# Patient Record
Sex: FEMALE | Race: WHITE | NOT HISPANIC OR LATINO | Employment: PART TIME | URBAN - NONMETROPOLITAN AREA
[De-identification: names, ages, dates, MRNs, and addresses within clinical notes are randomized per-mention and may not be internally consistent; named-entity substitution may affect disease eponyms.]

---

## 2024-03-29 ENCOUNTER — HOSPITAL ENCOUNTER (EMERGENCY)
Facility: HOSPITAL | Age: 79
Discharge: HOME | End: 2024-03-29
Attending: STUDENT IN AN ORGANIZED HEALTH CARE EDUCATION/TRAINING PROGRAM
Payer: COMMERCIAL

## 2024-03-29 ENCOUNTER — APPOINTMENT (EMERGENCY)
Dept: CT IMAGING | Facility: HOSPITAL | Age: 79
End: 2024-03-29
Payer: COMMERCIAL

## 2024-03-29 ENCOUNTER — OFFICE VISIT (OUTPATIENT)
Dept: URGENT CARE | Facility: CLINIC | Age: 79
End: 2024-03-29
Payer: COMMERCIAL

## 2024-03-29 VITALS
SYSTOLIC BLOOD PRESSURE: 108 MMHG | WEIGHT: 213 LBS | DIASTOLIC BLOOD PRESSURE: 54 MMHG | HEART RATE: 66 BPM | HEIGHT: 64 IN | TEMPERATURE: 97.2 F | OXYGEN SATURATION: 98 % | BODY MASS INDEX: 36.37 KG/M2 | RESPIRATION RATE: 18 BRPM

## 2024-03-29 VITALS
DIASTOLIC BLOOD PRESSURE: 56 MMHG | RESPIRATION RATE: 20 BRPM | TEMPERATURE: 97.6 F | HEIGHT: 64 IN | OXYGEN SATURATION: 94 % | SYSTOLIC BLOOD PRESSURE: 119 MMHG | HEART RATE: 86 BPM | WEIGHT: 213 LBS | BODY MASS INDEX: 36.37 KG/M2

## 2024-03-29 DIAGNOSIS — R79.89 ELEVATED SERUM CREATININE: ICD-10-CM

## 2024-03-29 DIAGNOSIS — R30.0 DYSURIA: Primary | ICD-10-CM

## 2024-03-29 DIAGNOSIS — N30.90 CYSTITIS: Primary | ICD-10-CM

## 2024-03-29 LAB
ALBUMIN SERPL BCP-MCNC: 4.1 G/DL (ref 3.5–5)
ALP SERPL-CCNC: 58 U/L (ref 34–104)
ALT SERPL W P-5'-P-CCNC: 11 U/L (ref 7–52)
ANION GAP SERPL CALCULATED.3IONS-SCNC: 7 MMOL/L (ref 4–13)
AST SERPL W P-5'-P-CCNC: 16 U/L (ref 13–39)
BACTERIA UR QL AUTO: ABNORMAL /HPF
BASOPHILS # BLD AUTO: 0.02 THOUSANDS/ÂΜL (ref 0–0.1)
BASOPHILS NFR BLD AUTO: 0 % (ref 0–1)
BILIRUB SERPL-MCNC: 0.43 MG/DL (ref 0.2–1)
BILIRUB UR QL STRIP: ABNORMAL
BUN SERPL-MCNC: 37 MG/DL (ref 5–25)
CALCIUM SERPL-MCNC: 9.3 MG/DL (ref 8.4–10.2)
CHLORIDE SERPL-SCNC: 104 MMOL/L (ref 96–108)
CLARITY UR: CLEAR
CO2 SERPL-SCNC: 29 MMOL/L (ref 21–32)
COLOR UR: YELLOW
CREAT SERPL-MCNC: 1.32 MG/DL (ref 0.6–1.3)
EOSINOPHIL # BLD AUTO: 0.04 THOUSAND/ÂΜL (ref 0–0.61)
EOSINOPHIL NFR BLD AUTO: 1 % (ref 0–6)
ERYTHROCYTE [DISTWIDTH] IN BLOOD BY AUTOMATED COUNT: 13.6 % (ref 11.6–15.1)
GFR SERPL CREATININE-BSD FRML MDRD: 39 ML/MIN/1.73SQ M
GLUCOSE SERPL-MCNC: 132 MG/DL (ref 65–140)
GLUCOSE UR STRIP-MCNC: NEGATIVE MG/DL
HCT VFR BLD AUTO: 35.1 % (ref 34.8–46.1)
HGB BLD-MCNC: 10.8 G/DL (ref 11.5–15.4)
HGB UR QL STRIP.AUTO: ABNORMAL
IMM GRANULOCYTES # BLD AUTO: 0.03 THOUSAND/UL (ref 0–0.2)
IMM GRANULOCYTES NFR BLD AUTO: 0 % (ref 0–2)
KETONES UR STRIP-MCNC: NEGATIVE MG/DL
LACTATE SERPL-SCNC: 1.1 MMOL/L (ref 0.5–2)
LEUKOCYTE ESTERASE UR QL STRIP: ABNORMAL
LYMPHOCYTES # BLD AUTO: 0.8 THOUSANDS/ÂΜL (ref 0.6–4.47)
LYMPHOCYTES NFR BLD AUTO: 11 % (ref 14–44)
MCH RBC QN AUTO: 32.6 PG (ref 26.8–34.3)
MCHC RBC AUTO-ENTMCNC: 30.8 G/DL (ref 31.4–37.4)
MCV RBC AUTO: 106 FL (ref 82–98)
MONOCYTES # BLD AUTO: 0.85 THOUSAND/ÂΜL (ref 0.17–1.22)
MONOCYTES NFR BLD AUTO: 11 % (ref 4–12)
NEUTROPHILS # BLD AUTO: 5.87 THOUSANDS/ÂΜL (ref 1.85–7.62)
NEUTS SEG NFR BLD AUTO: 77 % (ref 43–75)
NITRITE UR QL STRIP: NEGATIVE
NON-SQ EPI CELLS URNS QL MICRO: ABNORMAL /HPF
NRBC BLD AUTO-RTO: 0 /100 WBCS
PH UR STRIP.AUTO: 5 [PH]
PLATELET # BLD AUTO: 162 THOUSANDS/UL (ref 149–390)
PMV BLD AUTO: 10.5 FL (ref 8.9–12.7)
POTASSIUM SERPL-SCNC: 4.2 MMOL/L (ref 3.5–5.3)
PROT SERPL-MCNC: 7.1 G/DL (ref 6.4–8.4)
PROT UR STRIP-MCNC: NEGATIVE MG/DL
RBC # BLD AUTO: 3.31 MILLION/UL (ref 3.81–5.12)
RBC #/AREA URNS AUTO: ABNORMAL /HPF
SODIUM SERPL-SCNC: 140 MMOL/L (ref 135–147)
SP GR UR STRIP.AUTO: 1.02 (ref 1–1.03)
UROBILINOGEN UR QL STRIP.AUTO: 0.2 E.U./DL
WBC # BLD AUTO: 7.61 THOUSAND/UL (ref 4.31–10.16)
WBC #/AREA URNS AUTO: ABNORMAL /HPF

## 2024-03-29 PROCEDURE — 96365 THER/PROPH/DIAG IV INF INIT: CPT

## 2024-03-29 PROCEDURE — 80053 COMPREHEN METABOLIC PANEL: CPT | Performed by: PHYSICIAN ASSISTANT

## 2024-03-29 PROCEDURE — 36415 COLL VENOUS BLD VENIPUNCTURE: CPT | Performed by: PHYSICIAN ASSISTANT

## 2024-03-29 PROCEDURE — 99285 EMERGENCY DEPT VISIT HI MDM: CPT | Performed by: PHYSICIAN ASSISTANT

## 2024-03-29 PROCEDURE — 85025 COMPLETE CBC W/AUTO DIFF WBC: CPT | Performed by: PHYSICIAN ASSISTANT

## 2024-03-29 PROCEDURE — 99203 OFFICE O/P NEW LOW 30 MIN: CPT

## 2024-03-29 PROCEDURE — S9083 URGENT CARE CENTER GLOBAL: HCPCS

## 2024-03-29 PROCEDURE — 74177 CT ABD & PELVIS W/CONTRAST: CPT

## 2024-03-29 PROCEDURE — 81001 URINALYSIS AUTO W/SCOPE: CPT | Performed by: PHYSICIAN ASSISTANT

## 2024-03-29 PROCEDURE — 99283 EMERGENCY DEPT VISIT LOW MDM: CPT

## 2024-03-29 PROCEDURE — 96361 HYDRATE IV INFUSION ADD-ON: CPT

## 2024-03-29 PROCEDURE — 83605 ASSAY OF LACTIC ACID: CPT | Performed by: PHYSICIAN ASSISTANT

## 2024-03-29 RX ORDER — OMEPRAZOLE 20 MG/1
20 CAPSULE, DELAYED RELEASE ORAL DAILY
COMMUNITY
Start: 2024-03-18

## 2024-03-29 RX ORDER — ATORVASTATIN CALCIUM 40 MG/1
40 TABLET, FILM COATED ORAL DAILY
COMMUNITY
Start: 2023-07-31 | End: 2024-07-25

## 2024-03-29 RX ORDER — FUROSEMIDE 40 MG/1
40 TABLET ORAL DAILY
COMMUNITY
Start: 2023-11-20

## 2024-03-29 RX ORDER — WARFARIN SODIUM 5 MG/1
TABLET ORAL
COMMUNITY
Start: 2024-03-21

## 2024-03-29 RX ORDER — ASPIRIN 81 MG/1
81 TABLET, CHEWABLE ORAL DAILY
COMMUNITY

## 2024-03-29 RX ORDER — MONTELUKAST SODIUM 10 MG/1
10 TABLET ORAL
COMMUNITY
Start: 2024-03-11 | End: 2025-03-11

## 2024-03-29 RX ORDER — FOLIC ACID 1 MG/1
1 TABLET ORAL DAILY
COMMUNITY
Start: 2023-09-28 | End: 2024-09-27

## 2024-03-29 RX ORDER — CEFTRIAXONE 1 G/50ML
1000 INJECTION, SOLUTION INTRAVENOUS ONCE
Status: COMPLETED | OUTPATIENT
Start: 2024-03-29 | End: 2024-03-29

## 2024-03-29 RX ORDER — CEFDINIR 300 MG/1
300 CAPSULE ORAL EVERY 12 HOURS SCHEDULED
Qty: 10 CAPSULE | Refills: 0 | Status: SHIPPED | OUTPATIENT
Start: 2024-03-29 | End: 2024-04-03

## 2024-03-29 RX ORDER — FLUTICASONE PROPIONATE AND SALMETEROL 250; 50 UG/1; UG/1
POWDER RESPIRATORY (INHALATION)
COMMUNITY
Start: 2024-03-08

## 2024-03-29 RX ORDER — MONTELUKAST SODIUM 4 MG/1
TABLET, CHEWABLE ORAL
COMMUNITY
Start: 2024-03-18

## 2024-03-29 RX ORDER — LISINOPRIL 10 MG/1
10 TABLET ORAL DAILY
COMMUNITY
Start: 2023-11-20

## 2024-03-29 RX ORDER — NITROFURANTOIN 25; 75 MG/1; MG/1
100 CAPSULE ORAL
COMMUNITY
Start: 2024-03-26 | End: 2024-03-31

## 2024-03-29 RX ORDER — GABAPENTIN 800 MG/1
800 TABLET ORAL 3 TIMES DAILY
COMMUNITY
Start: 2024-02-02

## 2024-03-29 RX ADMIN — CEFTRIAXONE 1000 MG: 1 INJECTION, SOLUTION INTRAVENOUS at 21:45

## 2024-03-29 RX ADMIN — SODIUM CHLORIDE 500 ML: 0.9 INJECTION, SOLUTION INTRAVENOUS at 20:50

## 2024-03-29 RX ADMIN — IOHEXOL 100 ML: 350 INJECTION, SOLUTION INTRAVENOUS at 20:12

## 2024-03-29 NOTE — ED NOTES
Pt denies having to urinate at this time. Pt made aware urine sample needed and to notify nurse when she feels she can give urine sample.      Leidy Denise, RAFAEL  03/29/24 1932

## 2024-03-29 NOTE — PATIENT INSTRUCTIONS
Follow up with PCP in 3-5 days.  Proceed to ER for further evaluation and management of symptoms     If tests have been performed at [facility], our office will contact you with results if changes need to be made to the care plan discussed with you at the visit.  You can review your full results on [facility] MyChart.    Urinary Tract Infection in Women   AMBULATORY CARE:   A urinary tract infection (UTI)  is caused by bacteria that get inside your urinary tract. Your urinary tract includes your kidneys, ureters, bladder, and urethra. A UTI is more common in your lower urinary tract, which includes your bladder and urethra.       Common symptoms include the following:   Urinating more often or waking from sleep to urinate    Pain or burning when you urinate    Pain or pressure in your lower abdomen and back    Urine that smells bad    Blood in your urine    Leaking urine    Seek care immediately if:   You are urinating very little or not at all.    You have a high fever with shaking chills.    You have side or back pain that gets worse.    Call your doctor if:   You have a fever.    You do not feel better after 2 days of taking antibiotics.    You have new symptoms, such as blood or pus in your urine.    You are vomiting.    You have questions or concerns about your condition or care.    Treatment for a UTI  may include antibiotics to treat a bacterial infection. You may also need medicines to decrease pain and burning, or decrease the urge to urinate often. If you have UTIs often (called recurrent UTIs), you may be given antibiotics to take regularly. You will be given directions for when and how to use antibiotics. The goal is to prevent UTIs but not cause antibiotic resistance by using antibiotics too often.  Prevent a UTI:   Empty your bladder often.  Urinate and empty your bladder as soon as you feel the need. Do not hold your urine for long periods of time.    Wipe from front to back after you urinate or have a  bowel movement.  This will help prevent germs from getting into your urinary tract through your urethra.    Drink liquids as directed.  Ask how much liquid to drink each day and which liquids are best for you. You may need to drink more liquids than usual to help flush out the bacteria. Do not drink alcohol, caffeine, or citrus juices. These can irritate your bladder and increase your symptoms. Your healthcare provider may recommend cranberry juice to help prevent a UTI.    Urinate before and after you have sex.  This can help flush out bacteria passed during sex.    Do not douche or use feminine deodorants.  These can change the chemical balance in your vagina.    Change sanitary pads or tampons often.  This will help prevent germs from getting into your urinary tract.    Talk to your healthcare provider about your birth control method.  You may need to change your method if it is increasing your risk for UTIs.    Wear cotton underwear and clothes that are loose.  Tight pants and nylon underwear can trap moisture and cause bacteria to grow.    Vaginal estrogen may be recommended.  This medicine helps prevent UTIs in women who have gone through menopause or are in herminio-menopause.    Do pelvic muscle exercises often.  Pelvic muscle exercises may help you start and stop urinating. Strong pelvic muscles may help you empty your bladder easier. Squeeze these muscles tightly for 5 seconds like you are trying to hold back urine. Then relax for 5 seconds. Gradually work up to squeezing for 10 seconds. Do 3 sets of 15 repetitions a day, or as directed.    Follow up with your doctor as directed:  Write down your questions so you remember to ask them during your visits.  © Copyright [employer] 2023 Information is for End User's use only and may not be sold, redistributed or otherwise used for commercial purposes.  The above information is an  only. It is not intended as medical advice for individual conditions or  treatments. Talk to your doctor, nurse or pharmacist before following any medical regimen to see if it is safe and effective for you.

## 2024-03-29 NOTE — PROGRESS NOTES
[facility]        NAME: Mary Carmen Woo is a 78 y.o. female  : 1945    MRN: [id number]  DATE: 2024  TIME: 5:57 PM    Assessment and Plan   Dysuria [R30.0]  1. Dysuria  CANCELED: POCT urine dip        Patient provided water but unable to produce urine specimen. Patient ill-appearing with dry mucous membranes and concern for worsening infection despite Macrobid for acute cystitis. Requires higher level of care and recommend she proceed to ED for further evaluation and management of symptoms. Patient agreeable. Complete assessment deferred to ED.     Patient Instructions     Patient Instructions   Follow up with PCP in 3-5 days.  Proceed to ER for further evaluation and management of symptoms     If tests have been performed at [facility], our office will contact you with results if changes need to be made to the care plan discussed with you at the visit.  You can review your full results on [facility] MyChart.    Urinary Tract Infection in Women   AMBULATORY CARE:   A urinary tract infection (UTI)  is caused by bacteria that get inside your urinary tract. Your urinary tract includes your kidneys, ureters, bladder, and urethra. A UTI is more common in your lower urinary tract, which includes your bladder and urethra.       Common symptoms include the following:   Urinating more often or waking from sleep to urinate    Pain or burning when you urinate    Pain or pressure in your lower abdomen and back    Urine that smells bad    Blood in your urine    Leaking urine    Seek care immediately if:   You are urinating very little or not at all.    You have a high fever with shaking chills.    You have side or back pain that gets worse.    Call your doctor if:   You have a fever.    You do not feel better after 2 days of taking antibiotics.    You have new symptoms, such as blood or pus in your urine.    You are vomiting.    You have questions or concerns about your condition or care.    Treatment  for a UTI  may include antibiotics to treat a bacterial infection. You may also need medicines to decrease pain and burning, or decrease the urge to urinate often. If you have UTIs often (called recurrent UTIs), you may be given antibiotics to take regularly. You will be given directions for when and how to use antibiotics. The goal is to prevent UTIs but not cause antibiotic resistance by using antibiotics too often.  Prevent a UTI:   Empty your bladder often.  Urinate and empty your bladder as soon as you feel the need. Do not hold your urine for long periods of time.    Wipe from front to back after you urinate or have a bowel movement.  This will help prevent germs from getting into your urinary tract through your urethra.    Drink liquids as directed.  Ask how much liquid to drink each day and which liquids are best for you. You may need to drink more liquids than usual to help flush out the bacteria. Do not drink alcohol, caffeine, or citrus juices. These can irritate your bladder and increase your symptoms. Your healthcare provider may recommend cranberry juice to help prevent a UTI.    Urinate before and after you have sex.  This can help flush out bacteria passed during sex.    Do not douche or use feminine deodorants.  These can change the chemical balance in your vagina.    Change sanitary pads or tampons often.  This will help prevent germs from getting into your urinary tract.    Talk to your healthcare provider about your birth control method.  You may need to change your method if it is increasing your risk for UTIs.    Wear cotton underwear and clothes that are loose.  Tight pants and nylon underwear can trap moisture and cause bacteria to grow.    Vaginal estrogen may be recommended.  This medicine helps prevent UTIs in women who have gone through menopause or are in herminio-menopause.    Do pelvic muscle exercises often.  Pelvic muscle exercises may help you start and stop urinating. Strong pelvic  muscles may help you empty your bladder easier. Squeeze these muscles tightly for 5 seconds like you are trying to hold back urine. Then relax for 5 seconds. Gradually work up to squeezing for 10 seconds. Do 3 sets of 15 repetitions a day, or as directed.    Follow up with your doctor as directed:  Write down your questions so you remember to ask them during your visits.  © Copyright [employer] 2023 Information is for End User's use only and may not be sold, redistributed or otherwise used for commercial purposes.  The above information is an  only. It is not intended as medical advice for individual conditions or treatments. Talk to your doctor, nurse or pharmacist before following any medical regimen to see if it is safe and effective for you.        Chief Complaint     Chief Complaint   Patient presents with    Vaginal Pain     DX on Tuesday with a UTI, comes in today with Vaginal pain         History of Present Illness       78-year-old female with a past medical history significant for CHF presents with complaints of dysuria, vaginal pain and burning, as well as body aches x 1 day.  Patient reports being diagnosed with urinary tract infection on 3/26/2024 by PCP.  She is currently taking Macrobid as prescribed and reported initial symptom improvement until today.  Denies fever, chills, nausea, vomiting, diarrhea, or flank pain.  No hematuria.  No vaginal discharge.  Patient has been taking OTC Tylenol.     Vaginal Pain  The patient's pertinent negatives include no vaginal discharge. Associated symptoms include back pain and dysuria. Pertinent negatives include no abdominal pain, chills, diarrhea, fever, flank pain, hematuria, nausea, rash, sore throat or vomiting.       Review of Systems   Review of Systems   Constitutional:  Negative for chills and fever.   HENT:  Negative for congestion, ear discharge, ear pain, rhinorrhea and sore throat.    Eyes:  Negative for discharge.   Respiratory:   Negative for cough, shortness of breath and wheezing.    Cardiovascular:  Negative for chest pain.   Gastrointestinal:  Negative for abdominal pain, diarrhea, nausea and vomiting.   Genitourinary:  Positive for difficulty urinating, dysuria and vaginal pain. Negative for flank pain, hematuria and vaginal discharge.   Musculoskeletal:  Positive for back pain and myalgias.   Skin:  Negative for rash.         Current Medications       Current Outpatient Medications:     aspirin 81 mg chewable tablet, Chew 81 mg daily, Disp: , Rfl:     Aspirin-Calcium Carbonate  MG TABS, Take 81 mg by mouth, Disp: , Rfl:     atorvastatin (LIPITOR) 40 mg tablet, Take 40 mg by mouth daily, Disp: , Rfl:     Cholecalciferol 25 MCG (1000 UT) capsule, Take 1,000 Units by mouth daily, Disp: , Rfl:     colestipol (COLESTID) 1 g tablet, , Disp: , Rfl:     Fluticasone-Salmeterol (Advair) 250-50 mcg/dose inhaler, INHALE 1 DOSE BY MOUTH TWICE DAILY, Disp: , Rfl:     folic acid (FOLVITE) 1 mg tablet, Take 1 mg by mouth daily, Disp: , Rfl:     furosemide (LASIX) 40 mg tablet, Take 40 mg by mouth daily, Disp: , Rfl:     gabapentin (NEURONTIN) 800 mg tablet, Take 800 mg by mouth 3 (three) times a day, Disp: , Rfl:     lisinopril (ZESTRIL) 10 mg tablet, Take 10 mg by mouth daily, Disp: , Rfl:     metoprolol tartrate (LOPRESSOR) 25 mg tablet, Take 25 mg by mouth every 12 (twelve) hours, Disp: , Rfl:     montelukast (SINGULAIR) 10 mg tablet, Take 10 mg by mouth, Disp: , Rfl:     nitrofurantoin (MACROBID) 100 mg capsule, Take 100 mg by mouth, Disp: , Rfl:     omeprazole (PriLOSEC) 20 mg delayed release capsule, Take 20 mg by mouth daily, Disp: , Rfl:     warfarin (COUMADIN) 5 mg tablet, , Disp: , Rfl:     Current Allergies     Allergies as of 03/29/2024 - Reviewed 03/29/2024   Allergen Reaction Noted    Codeine GI Intolerance and Other (See Comments) 01/22/2013    Sulfa antibiotics Other (See Comments) and Rash 01/22/2013            The following  "portions of the patient's history were reviewed and updated as appropriate: allergies, current medications, past family history, past medical history, past social history, past surgical history and problem list.     Past Medical History:   Diagnosis Date    GERD (gastroesophageal reflux disease)     Heart failure (HCC)     High blood cholesterol     High blood pressure        Past Surgical History:   Procedure Laterality Date    APPENDECTOMY      BACK SURGERY      CATARACT EXTRACTION Bilateral     CHOLECYSTECTOMY      REPLACEMENT TOTAL KNEE Bilateral     TUBAL LIGATION         No family history on file.      Medications have been verified.        Objective   /56   Pulse 86   Temp 97.6 °F (36.4 °C)   Resp 20   Ht 5' 4\" (1.626 m)   Wt 96.6 kg (213 lb)   SpO2 94%   BMI 36.56 kg/m²   No LMP recorded.       Physical Exam     Physical Exam  Vitals and nursing note reviewed.   Constitutional:       General: She is not in acute distress.     Appearance: She is ill-appearing. She is not toxic-appearing.   HENT:      Head: Normocephalic.      Nose: Nose normal.      Mouth/Throat:      Mouth: Mucous membranes are dry.      Pharynx: Oropharynx is clear.   Eyes:      Conjunctiva/sclera: Conjunctivae normal.   Cardiovascular:      Rate and Rhythm: Normal rate and regular rhythm.      Heart sounds: Normal heart sounds.   Pulmonary:      Effort: Pulmonary effort is normal. No respiratory distress.      Breath sounds: Normal breath sounds. No stridor. No wheezing, rhonchi or rales.   Abdominal:      General: Bowel sounds are normal.      Palpations: Abdomen is soft.      Tenderness: There is abdominal tenderness in the suprapubic area. There is no right CVA tenderness or left CVA tenderness.   Skin:     General: Skin is warm and dry.   Neurological:      Mental Status: She is alert and oriented to person, place, and time.      Gait: Gait is intact.   Psychiatric:         Mood and Affect: Mood normal.         Behavior: " Behavior normal.

## 2024-03-29 NOTE — ED PROVIDER NOTES
"History  Chief Complaint   Patient presents with    Shoulder Pain     Patient reports starting today while at work; denies injury.    Painful Urination     Patient reports recently treated with AntBx for UTI. Symptoms improved, however, today she has painful urination. Patient reports difficulty voiding.     78 year old female presnts to the ED for evaluation dysuria. States Tuesday she went to PCP due to dysuria. Found to have UTI. Started on Macrobid. Has been taking since. Reports she has been feeling \"not to bad\" over the next few days until she went to work at 1430. States at work she developed the dysuria again and pelvic pressure. States she has been peeing frequently recently until today. States she urinated 4 times today and did take her lasix. Reports body aches and pains. Reports she had aches in her shoulders and neck. States she was seen at urgent care and told she might still have UTI and sent to the ED for further evaluation. Patient denies fevers.         Prior to Admission Medications   Prescriptions Last Dose Informant Patient Reported? Taking?   Aspirin-Calcium Carbonate  MG TABS   Yes No   Sig: Take 81 mg by mouth   Cholecalciferol 25 MCG (1000 UT) capsule   Yes No   Sig: Take 1,000 Units by mouth daily   Fluticasone-Salmeterol (Advair) 250-50 mcg/dose inhaler   Yes No   Sig: INHALE 1 DOSE BY MOUTH TWICE DAILY   aspirin 81 mg chewable tablet   Yes No   Sig: Chew 81 mg daily   atorvastatin (LIPITOR) 40 mg tablet   Yes No   Sig: Take 40 mg by mouth daily   colestipol (COLESTID) 1 g tablet   Yes No   folic acid (FOLVITE) 1 mg tablet   Yes No   Sig: Take 1 mg by mouth daily   furosemide (LASIX) 40 mg tablet   Yes No   Sig: Take 40 mg by mouth daily   gabapentin (NEURONTIN) 800 mg tablet   Yes No   Sig: Take 800 mg by mouth 3 (three) times a day   lisinopril (ZESTRIL) 10 mg tablet   Yes No   Sig: Take 10 mg by mouth daily   metoprolol tartrate (LOPRESSOR) 25 mg tablet   Yes No   Sig: Take 25 mg " by mouth every 12 (twelve) hours   montelukast (SINGULAIR) 10 mg tablet   Yes No   Sig: Take 10 mg by mouth   nitrofurantoin (MACROBID) 100 mg capsule   Yes No   Sig: Take 100 mg by mouth   omeprazole (PriLOSEC) 20 mg delayed release capsule   Yes No   Sig: Take 20 mg by mouth daily   warfarin (COUMADIN) 5 mg tablet   Yes No      Facility-Administered Medications: None       Past Medical History:   Diagnosis Date    GERD (gastroesophageal reflux disease)     Heart failure (HCC)     High blood cholesterol     High blood pressure        Past Surgical History:   Procedure Laterality Date    APPENDECTOMY      BACK SURGERY      CATARACT EXTRACTION Bilateral     CHOLECYSTECTOMY      REPLACEMENT TOTAL KNEE Bilateral     TUBAL LIGATION         History reviewed. No pertinent family history.  I have reviewed and agree with the history as documented.    E-Cigarette/Vaping    E-Cigarette Use Never User      E-Cigarette/Vaping Substances     Social History     Tobacco Use    Smoking status: Never     Passive exposure: Never    Smokeless tobacco: Never   Vaping Use    Vaping status: Never Used   Substance Use Topics    Alcohol use: Not Currently    Drug use: Never       Review of Systems   Constitutional:  Negative for appetite change, fatigue and fever.   Respiratory: Negative.     Cardiovascular: Negative.    Genitourinary:  Positive for dysuria and pelvic pain.   Musculoskeletal:  Positive for myalgias.   Skin: Negative.    Neurological: Negative.    All other systems reviewed and are negative.      Physical Exam  Physical Exam  Vitals and nursing note reviewed.   Constitutional:       General: She is not in acute distress.     Appearance: Normal appearance. She is not ill-appearing, toxic-appearing or diaphoretic.   HENT:      Head: Normocephalic.      Nose: Nose normal.      Mouth/Throat:      Mouth: Mucous membranes are dry.   Eyes:      Conjunctiva/sclera: Conjunctivae normal.      Pupils: Pupils are equal, round, and  reactive to light.   Cardiovascular:      Rate and Rhythm: Normal rate and regular rhythm.   Pulmonary:      Effort: Pulmonary effort is normal.      Breath sounds: Normal breath sounds. No stridor. No wheezing, rhonchi or rales.   Chest:      Chest wall: No tenderness.   Abdominal:      General: Abdomen is flat. There is no distension.      Palpations: Abdomen is soft.      Tenderness: There is no abdominal tenderness. There is no guarding.   Musculoskeletal:         General: Normal range of motion.      Cervical back: Normal range of motion.   Skin:     General: Skin is warm and dry.   Neurological:      General: No focal deficit present.      Mental Status: She is alert.         Vital Signs  ED Triage Vitals [03/29/24 1853]   Temperature Pulse Respirations Blood Pressure SpO2   (!) 97.2 °F (36.2 °C) 84 20 133/77 100 %      Temp Source Heart Rate Source Patient Position - Orthostatic VS BP Location FiO2 (%)   Temporal Monitor Sitting Left arm --      Pain Score       4           Vitals:    03/29/24 1853 03/29/24 1915 03/29/24 2030   BP: 133/77 107/55 108/54   Pulse: 84 76 66   Patient Position - Orthostatic VS: Sitting           Visual Acuity      ED Medications  Medications   sodium chloride 0.9 % bolus 500 mL (500 mL Intravenous New Bag 3/29/24 2050)   cefTRIAXone (ROCEPHIN) IVPB (premix in dextrose) 1,000 mg 50 mL (has no administration in time range)   iohexol (OMNIPAQUE) 350 MG/ML injection (MULTI-DOSE) 100 mL (100 mL Intravenous Given 3/29/24 2012)       Diagnostic Studies  Results Reviewed       Procedure Component Value Units Date/Time    Urine Microscopic [[id number]]  (Abnormal) Collected: 03/29/24 2011    Lab Status: Final result Specimen: Urine, Clean Catch Updated: 03/29/24 2036     RBC, UA 10-20 /hpf      WBC, UA 2-4 /hpf      Epithelial Cells Occasional /hpf      Bacteria, UA None Seen /hpf     UA w Reflex to Microscopic w Reflex to Culture [[id number]]  (Abnormal) Collected: 03/29/24 2011    Lab  Status: Final result Specimen: Urine, Clean Catch Updated: 03/29/24 2022     Color, UA Yellow     Clarity, UA Clear     Specific Gravity, UA 1.020     pH, UA 5.0     Leukocytes, UA Small     Nitrite, UA Negative     Protein, UA Negative mg/dl      Glucose, UA Negative mg/dl      Ketones, UA Negative mg/dl      Urobilinogen, UA 0.2 E.U./dl      Bilirubin, UA Small     Occult Blood, UA Moderate    Lactic acid, plasma (w/reflex if result > 2.0) [[id number]]  (Normal) Collected: 03/29/24 1926    Lab Status: Final result Specimen: Blood from Arm, Left Updated: 03/29/24 1958     LACTIC ACID 1.1 mmol/L     Narrative:      Result may be elevated if tourniquet was used during collection.    Comprehensive metabolic panel [[id number]]  (Abnormal) Collected: 03/29/24 1926    Lab Status: Final result Specimen: Blood from Arm, Left Updated: 03/29/24 1958     Sodium 140 mmol/L      Potassium 4.2 mmol/L      Chloride 104 mmol/L      CO2 29 mmol/L      ANION GAP 7 mmol/L      BUN 37 mg/dL      Creatinine 1.32 mg/dL      Glucose 132 mg/dL      Calcium 9.3 mg/dL      AST 16 U/L      ALT 11 U/L      Alkaline Phosphatase 58 U/L      Total Protein 7.1 g/dL      Albumin 4.1 g/dL      Total Bilirubin 0.43 mg/dL      eGFR 39 ml/min/1.73sq m     Narrative:      National Kidney Disease Foundation guidelines for Chronic Kidney Disease (CKD):     Stage 1 with normal or high GFR (GFR > 90 mL/min/1.73 square meters)    Stage 2 Mild CKD (GFR = 60-89 mL/min/1.73 square meters)    Stage 3A Moderate CKD (GFR = 45-59 mL/min/1.73 square meters)    Stage 3B Moderate CKD (GFR = 30-44 mL/min/1.73 square meters)    Stage 4 Severe CKD (GFR = 15-29 mL/min/1.73 square meters)    Stage 5 End Stage CKD (GFR <15 mL/min/1.73 square meters)  Note: GFR calculation is accurate only with a steady state creatinine    CBC and differential [[id number]]  (Abnormal) Collected: 03/29/24 1926    Lab Status: Final result Specimen: Blood from Arm, Left Updated: 03/29/24 1933      WBC 7.61 Thousand/uL      RBC 3.31 Million/uL      Hemoglobin 10.8 g/dL      Hematocrit 35.1 %       fL      MCH 32.6 pg      MCHC 30.8 g/dL      RDW 13.6 %      MPV 10.5 fL      Platelets 162 Thousands/uL      nRBC 0 /100 WBCs      Neutrophils Relative 77 %      Immature Grans % 0 %      Lymphocytes Relative 11 %      Monocytes Relative 11 %      Eosinophils Relative 1 %      Basophils Relative 0 %      Neutrophils Absolute 5.87 Thousands/µL      Absolute Immature Grans 0.03 Thousand/uL      Absolute Lymphocytes 0.80 Thousands/µL      Absolute Monocytes 0.85 Thousand/µL      Eosinophils Absolute 0.04 Thousand/µL      Basophils Absolute 0.02 Thousands/µL                    CT abdomen pelvis with contrast   Final Result by Juni Vazquez MD (03/29 2128)      Inflammatory or fibrotic changes in the lung bases bilaterally.      Nondistended bladder with wall thickening for urinalysis to exclude cystitis. This correlates with the provided clinical history of painful urination and difficulty voiding.         Workstation performed: [id number]                    Procedures  Procedures         ED Course  ED Course as of 03/29/24 2143   Fri Mar 29, 2024   1955 WBC: 7.61   2005 Creatinine(!): 1.32  KALLI.  Patient is tolerating p.o. intake.  Does take Lasix   2140 Please CTC results.  I discussed results and findings with the patient.  Discussed inpatient treatment versus trialing additional course of outpatient antibiotics.  Patient would prefer outpatient treatment at this time.  Will give dose of IV Rocephin and change patient's antibiotic to Ceftin.  She will hold her Lasix for 1 day and then continue as normal.  She will stay well-hydrated.  She has follow-up with her PCP next week.  We discussed return precautions and she verbalized understanding.  She was clinically and hemodynamically stable                               SBIRT 20yo+      Flowsheet Row Most Recent Value   Initial Alcohol Screen: US AUDIT-C      1. How often do you have a drink containing alcohol? 0 Filed at: 03/29/2024 1855   2. How many drinks containing alcohol do you have on a typical day you are drinking?  0 Filed at: 03/29/2024 1855   3a. Male UNDER 65: How often do you have five or more drinks on one occasion? 0 Filed at: 03/29/2024 1855   3b. FEMALE Any Age, or MALE 65+: How often do you have 4 or more drinks on one occassion? 0 Filed at: 03/29/2024 1855   Audit-C Score 0 Filed at: 03/29/2024 1855   RONNA: How many times in the past year have you...    Used an illegal drug or used a prescription medication for non-medical reasons? Never Filed at: 03/29/2024 1855                      Medical Decision Making  78-year-old female presented to the emergency department for evaluation of dysuria.  Vitals and medical record reviewed.  Patient at risk for the following but not limited to urinary tract infection, cystitis, ureteral stone, hydration, electrolyte abnormality, KALLI.  Patient's UA not significantly concerning for UTI however CT was concerning for cystitis.  Patient being symptomatic she was given dose of IV Rocephin.  She was switched from Macrobid to cefdinir for outpatient antibiotics.  Have small elevation in her creatinine was given some amount of fluids in the emergency department educated to stay well-hydrated and hold her Lasix for 1 day.  She has follow-up with her PCP next week.  Prefer discharge home rather than admission.  We discussed strict return precautions in the meantime and patient verbalized understanding.  She is clinically and hemodynamically stable for discharge.    Problems Addressed:  Cystitis: acute illness or injury  Elevated serum creatinine: acute illness or injury    Amount and/or Complexity of Data Reviewed  Labs: ordered. Decision-making details documented in ED Course.  Radiology: ordered.    Risk  Prescription drug management.             Disposition  Final diagnoses:   Cystitis   Elevated serum creatinine      Time reflects when diagnosis was documented in both MDM as applicable and the Disposition within this note       Time User Action Codes Description Comment    3/29/2024  9:41 PM Anabelle Rosales [N30.90] Cystitis     3/29/2024  9:41 PM Anabelle Rosales [R79.89] Elevated serum creatinine           ED Disposition       ED Disposition   Discharge    Condition   Stable    Date/Time   Fri Mar 29, 2024 2141    Comment   Mary Carmen Woo discharge to home/self care.                   Follow-up Information       Follow up With Specialties Details Why Contact Info    Maya Quezada MD Family Medicine In 3 days  [address]  [address]  [facility] [address] [phone]              Patient's Medications   Discharge Prescriptions    CEFDINIR (OMNICEF) 300 MG CAPSULE    Take 1 capsule (300 mg total) by mouth every 12 (twelve) hours for 5 days       Start Date: 3/29/2024 End Date: 4/3/2024       Order Dose: 300 mg       Quantity: 10 capsule    Refills: 0       No discharge procedures on file.    PDMP Review       None            ED Provider  Electronically Signed by             Anabelle Rosales PA-C  03/29/24 2130       Anabelle Rosales PA-C  03/29/24 2143

## 2024-03-30 NOTE — ED NOTES
Per Anabelle BARROS pt okay to be d/c home after IV antibiotics are complete.      Leidy Denise RN  03/29/24 7366

## 2024-03-30 NOTE — ED ATTENDING ATTESTATION
3/29/2024  I, Gary Landers DO, saw and evaluated the patient. I have discussed the patient with the resident/non-physician practitioner and agree with the resident's/non-physician practitioner's findings, Plan of Care, and MDM as documented in the resident's/non-physician practitioner's note, except where noted. All available labs and Radiology studies were reviewed.  I was present for key portions of any procedure(s) performed by the resident/non-physician practitioner and I was immediately available to provide assistance.       At this point I agree with the current assessment done in the Emergency Department.  I have conducted an independent evaluation of this patient a history and physical is as follows:    78-year-old female.  Presents with suprapubic abdominal pain, pelvic pain. Currently on a course of Macrobid for treatment of UTI. W/u sig for mild KALLI. On Lasix. UA is noninfectious appearing. Given CT read, abx were changed to Keflex. The patient was counseled to hold her Lasix for the next 1-2 days. The patient has PCP follow up next week. See Ms. Rosales's note for further details.     Const: Well-nourished, Well-developed   HEENT: Atraumatic external nose and ears. Moist MM  Neck: Symmetric, trachea midline  CVS: +S1/S2, No murmurs or gallops. Peripheral pulses 2+ and equal in all extremities.  RESP: Unlabored respiratory effort. Clear to auscultation bilaterally   GI: Mild TTP along lower abdomen. Nondistended No hepatosplenomegaly   MSK: Normocephalic/Atraumatic, Extremities w/o deformity or ttp. No cyanosis or clubbing  Skin: Warm, Dry. No rashes or lesions.  Neuro: CNs II-XII grossly intact . Sensation grossly intact.  Psych: Awake, Alert, & Oriented (AAO) x3 . Appropriate mood and affect       ED Course  ED Course as of 03/29/24 2253   Fri Mar 29, 2024   2137 CT imaging with signs of a nondistended bladder with wall thickening for urinalysis to exclude cystitis.          Critical Care  Time  Procedures

## 2024-03-30 NOTE — DISCHARGE INSTRUCTIONS
Stop taking the Macrobid and please start new antibiotics sent to your pharmacy.  Stay well-hydrated hold your Lasix for 1 day and then continues as normal.  Follow-up with your family doctor next week.  You should have your kidney function rechecked.  In the meantime please return with any new or worsening symptoms

## 2024-06-22 ENCOUNTER — HOSPITAL ENCOUNTER (EMERGENCY)
Facility: HOSPITAL | Age: 79
Discharge: HOME | End: 2024-06-22
Attending: EMERGENCY MEDICINE
Payer: COMMERCIAL

## 2024-06-22 ENCOUNTER — OFFICE VISIT (OUTPATIENT)
Dept: URGENT CARE | Facility: CLINIC | Age: 79
End: 2024-06-22
Payer: COMMERCIAL

## 2024-06-22 VITALS
RESPIRATION RATE: 18 BRPM | WEIGHT: 210.8 LBS | HEART RATE: 80 BPM | BODY MASS INDEX: 35.99 KG/M2 | HEIGHT: 64 IN | TEMPERATURE: 98.2 F | DIASTOLIC BLOOD PRESSURE: 81 MMHG | SYSTOLIC BLOOD PRESSURE: 159 MMHG | OXYGEN SATURATION: 94 %

## 2024-06-22 VITALS
HEART RATE: 92 BPM | TEMPERATURE: 96.8 F | SYSTOLIC BLOOD PRESSURE: 126 MMHG | DIASTOLIC BLOOD PRESSURE: 60 MMHG | WEIGHT: 210 LBS | OXYGEN SATURATION: 94 % | HEIGHT: 64 IN | RESPIRATION RATE: 17 BRPM | BODY MASS INDEX: 35.85 KG/M2

## 2024-06-22 DIAGNOSIS — R94.31 ABNORMAL EKG: ICD-10-CM

## 2024-06-22 DIAGNOSIS — N76.0 ACUTE VAGINITIS: ICD-10-CM

## 2024-06-22 DIAGNOSIS — N39.0 UTI (URINARY TRACT INFECTION): Primary | ICD-10-CM

## 2024-06-22 DIAGNOSIS — R53.83 OTHER FATIGUE: Primary | ICD-10-CM

## 2024-06-22 LAB
ALBUMIN SERPL BCG-MCNC: 4 G/DL (ref 3.5–5)
ALP SERPL-CCNC: 61 U/L (ref 34–104)
ALT SERPL W P-5'-P-CCNC: 9 U/L (ref 7–52)
ANION GAP SERPL CALCULATED.3IONS-SCNC: 9 MMOL/L (ref 4–13)
APTT PPP: 27 SECONDS (ref 23–37)
AST SERPL W P-5'-P-CCNC: 14 U/L (ref 13–39)
BASOPHILS # BLD AUTO: 0.03 THOUSANDS/ÂΜL (ref 0–0.1)
BASOPHILS NFR BLD AUTO: 0 % (ref 0–1)
BILIRUB SERPL-MCNC: 0.7 MG/DL (ref 0.2–1)
BNP SERPL-MCNC: 135 PG/ML (ref 0–100)
BUN SERPL-MCNC: 27 MG/DL (ref 5–25)
CALCIUM SERPL-MCNC: 9.5 MG/DL (ref 8.4–10.2)
CARDIAC TROPONIN I PNL SERPL HS: 7 NG/L
CHLORIDE SERPL-SCNC: 107 MMOL/L (ref 96–108)
CO2 SERPL-SCNC: 25 MMOL/L (ref 21–32)
CREAT SERPL-MCNC: 1.07 MG/DL (ref 0.6–1.3)
EOSINOPHIL # BLD AUTO: 0.04 THOUSAND/ÂΜL (ref 0–0.61)
EOSINOPHIL NFR BLD AUTO: 1 % (ref 0–6)
ERYTHROCYTE [DISTWIDTH] IN BLOOD BY AUTOMATED COUNT: 13.6 % (ref 11.6–15.1)
GFR SERPL CREATININE-BSD FRML MDRD: 50 ML/MIN/1.73SQ M
GLUCOSE SERPL-MCNC: 97 MG/DL (ref 65–140)
HCT VFR BLD AUTO: 36.9 % (ref 34.8–46.1)
HGB BLD-MCNC: 11.5 G/DL (ref 11.5–15.4)
IMM GRANULOCYTES # BLD AUTO: 0.03 THOUSAND/UL (ref 0–0.2)
IMM GRANULOCYTES NFR BLD AUTO: 0 % (ref 0–2)
INR PPP: 1.72 (ref 0.84–1.19)
LACTATE SERPL-SCNC: 1.3 MMOL/L (ref 0.5–2)
LYMPHOCYTES # BLD AUTO: 0.81 THOUSANDS/ÂΜL (ref 0.6–4.47)
LYMPHOCYTES NFR BLD AUTO: 9 % (ref 14–44)
MAGNESIUM SERPL-MCNC: 1.7 MG/DL (ref 1.9–2.7)
MCH RBC QN AUTO: 32.9 PG (ref 26.8–34.3)
MCHC RBC AUTO-ENTMCNC: 31.2 G/DL (ref 31.4–37.4)
MCV RBC AUTO: 105 FL (ref 82–98)
MONOCYTES # BLD AUTO: 0.69 THOUSAND/ÂΜL (ref 0.17–1.22)
MONOCYTES NFR BLD AUTO: 8 % (ref 4–12)
NEUTROPHILS # BLD AUTO: 7.2 THOUSANDS/ÂΜL (ref 1.85–7.62)
NEUTS SEG NFR BLD AUTO: 82 % (ref 43–75)
NRBC BLD AUTO-RTO: 0 /100 WBCS
PLATELET # BLD AUTO: 170 THOUSANDS/UL (ref 149–390)
PMV BLD AUTO: 10.6 FL (ref 8.9–12.7)
POTASSIUM SERPL-SCNC: 4 MMOL/L (ref 3.5–5.3)
PROT SERPL-MCNC: 7.6 G/DL (ref 6.4–8.4)
PROTHROMBIN TIME: 20.5 SECONDS (ref 11.6–14.5)
RBC # BLD AUTO: 3.5 MILLION/UL (ref 3.81–5.12)
SODIUM SERPL-SCNC: 141 MMOL/L (ref 135–147)
WBC # BLD AUTO: 8.8 THOUSAND/UL (ref 4.31–10.16)

## 2024-06-22 PROCEDURE — 36415 COLL VENOUS BLD VENIPUNCTURE: CPT | Performed by: EMERGENCY MEDICINE

## 2024-06-22 PROCEDURE — 99213 OFFICE O/P EST LOW 20 MIN: CPT | Performed by: PHYSICIAN ASSISTANT

## 2024-06-22 PROCEDURE — 85025 COMPLETE CBC W/AUTO DIFF WBC: CPT | Performed by: EMERGENCY MEDICINE

## 2024-06-22 PROCEDURE — 83735 ASSAY OF MAGNESIUM: CPT | Performed by: EMERGENCY MEDICINE

## 2024-06-22 PROCEDURE — 93005 ELECTROCARDIOGRAM TRACING: CPT

## 2024-06-22 PROCEDURE — 80053 COMPREHEN METABOLIC PANEL: CPT | Performed by: EMERGENCY MEDICINE

## 2024-06-22 PROCEDURE — 87086 URINE CULTURE/COLONY COUNT: CPT | Performed by: PHYSICIAN ASSISTANT

## 2024-06-22 PROCEDURE — 87186 SC STD MICRODIL/AGAR DIL: CPT | Performed by: PHYSICIAN ASSISTANT

## 2024-06-22 PROCEDURE — 87077 CULTURE AEROBIC IDENTIFY: CPT | Performed by: PHYSICIAN ASSISTANT

## 2024-06-22 PROCEDURE — 87040 BLOOD CULTURE FOR BACTERIA: CPT | Performed by: EMERGENCY MEDICINE

## 2024-06-22 PROCEDURE — 85730 THROMBOPLASTIN TIME PARTIAL: CPT | Performed by: EMERGENCY MEDICINE

## 2024-06-22 PROCEDURE — 99283 EMERGENCY DEPT VISIT LOW MDM: CPT

## 2024-06-22 PROCEDURE — 96365 THER/PROPH/DIAG IV INF INIT: CPT

## 2024-06-22 PROCEDURE — 99284 EMERGENCY DEPT VISIT MOD MDM: CPT | Performed by: EMERGENCY MEDICINE

## 2024-06-22 PROCEDURE — 84484 ASSAY OF TROPONIN QUANT: CPT | Performed by: EMERGENCY MEDICINE

## 2024-06-22 PROCEDURE — 83605 ASSAY OF LACTIC ACID: CPT | Performed by: EMERGENCY MEDICINE

## 2024-06-22 PROCEDURE — 93005 ELECTROCARDIOGRAM TRACING: CPT | Performed by: PHYSICIAN ASSISTANT

## 2024-06-22 PROCEDURE — 83880 ASSAY OF NATRIURETIC PEPTIDE: CPT | Performed by: EMERGENCY MEDICINE

## 2024-06-22 PROCEDURE — S9083 URGENT CARE CENTER GLOBAL: HCPCS | Performed by: PHYSICIAN ASSISTANT

## 2024-06-22 PROCEDURE — 85610 PROTHROMBIN TIME: CPT | Performed by: EMERGENCY MEDICINE

## 2024-06-22 RX ORDER — NITROFURANTOIN 25; 75 MG/1; MG/1
100 CAPSULE ORAL
COMMUNITY
Start: 2024-06-17 | End: 2024-06-22

## 2024-06-22 RX ORDER — CLOTRIMAZOLE 1 %
1 CREAM WITH APPLICATOR VAGINAL 2 TIMES DAILY
Qty: 45 G | Refills: 0 | Status: SHIPPED | OUTPATIENT
Start: 2024-06-22 | End: 2024-06-29

## 2024-06-22 RX ORDER — LEVOFLOXACIN 750 MG/1
750 TABLET, FILM COATED ORAL EVERY 24 HOURS
Qty: 5 TABLET | Refills: 0 | Status: SHIPPED | OUTPATIENT
Start: 2024-06-22 | End: 2024-06-27

## 2024-06-22 RX ORDER — MAGNESIUM SULFATE HEPTAHYDRATE 40 MG/ML
2 INJECTION, SOLUTION INTRAVENOUS ONCE
Status: COMPLETED | OUTPATIENT
Start: 2024-06-22 | End: 2024-06-22

## 2024-06-22 RX ADMIN — LEVOFLOXACIN 750 MG: 500 TABLET, FILM COATED ORAL at 17:51

## 2024-06-22 RX ADMIN — MAGNESIUM SULFATE HEPTAHYDRATE 2 G: 40 INJECTION, SOLUTION INTRAVENOUS at 16:48

## 2024-06-22 NOTE — PROGRESS NOTES
[facility]        NAME: Mary Carmen Woo is a 78 y.o. female  : 1945    MRN: [id number]  DATE: 2024  TIME: 3:01 PM    Assessment and Plan   Other fatigue [R53.83]  1. Other fatigue  Urine culture    ECG 12 lead    CANCELED: XR chest pa & lateral      2. Abnormal EKG        3. Acute vaginitis  Urine culture    clotrimazole (GYNE-LOTRIMIN) 1 % vaginal cream            Patient Instructions   Go to ED    Follow up with PCP in 3-5 days.  Proceed to  ER if symptoms worsen.    If tests have been performed at [facility], our office will contact you with results if changes need to be made to the care plan discussed with you at the visit.  You can review your full results on [facility] MyChart.    Chief Complaint     Chief Complaint   Patient presents with    Urinary Tract Infection     UTI symptoms started Monday. Was seen by her family doctor and told she has a UTI and given an antibiotic. Has been using Azo's. Has been also having some fatigue          History of Present Illness       Patient is a 78-year-old female with significant past medical history of hypertension, hyperlipidemia, and heart failure presents the office complaining of vaginal burning for 5 days.  Patient states her vagina burns when she is not urinating.  States she does have urinary frequency but this is consistent with her history of Lasix.  Denies urinary urgency, hematuria, vaginal discharge, vaginal itching, or odors.  Reports she is otherwise been very fatigue with occasional dizziness and cannot stand for long periods of time.  Denies headache, vision change, chest pain, shortness of breath, change in bowel habits, or rash.  She was seen by her PCP on Monday for and treated for UTI with Macrobid.  She has been taking the antibiotic as prescribed with no relief of symptoms.  Per patient, results in her MyChart show no infection.  Patient states her last INR was 4.2 and was told she needs to have it tested again on Monday  due to elevation.        Review of Systems   Review of Systems   Constitutional:  Positive for fatigue. Negative for fever.   HENT:  Negative for congestion and sore throat.    Respiratory:  Negative for cough.    Gastrointestinal:  Negative for abdominal pain and diarrhea.   Genitourinary:  Positive for vaginal pain. Negative for decreased urine volume, dysuria, enuresis, pelvic pain, vaginal bleeding and vaginal discharge.   Skin:  Negative for rash.         Current Medications       Current Outpatient Medications:     aspirin 81 mg chewable tablet, Chew 81 mg daily, Disp: , Rfl:     Aspirin-Calcium Carbonate  MG TABS, Take 81 mg by mouth, Disp: , Rfl:     atorvastatin (LIPITOR) 40 mg tablet, Take 40 mg by mouth daily, Disp: , Rfl:     Cholecalciferol 25 MCG (1000 UT) capsule, Take 1,000 Units by mouth daily, Disp: , Rfl:     clotrimazole (GYNE-LOTRIMIN) 1 % vaginal cream, Insert 1 applicator into the vagina 2 (two) times a day for 7 days, Disp: 45 g, Rfl: 0    colestipol (COLESTID) 1 g tablet, , Disp: , Rfl:     Fluticasone-Salmeterol (Advair) 250-50 mcg/dose inhaler, INHALE 1 DOSE BY MOUTH TWICE DAILY, Disp: , Rfl:     folic acid (FOLVITE) 1 mg tablet, Take 1 mg by mouth daily, Disp: , Rfl:     furosemide (LASIX) 40 mg tablet, Take 40 mg by mouth daily, Disp: , Rfl:     gabapentin (NEURONTIN) 800 mg tablet, Take 800 mg by mouth 3 (three) times a day, Disp: , Rfl:     lisinopril (ZESTRIL) 10 mg tablet, Take 10 mg by mouth daily, Disp: , Rfl:     metoprolol tartrate (LOPRESSOR) 25 mg tablet, Take 25 mg by mouth every 12 (twelve) hours, Disp: , Rfl:     montelukast (SINGULAIR) 10 mg tablet, Take 10 mg by mouth, Disp: , Rfl:     nitrofurantoin (MACROBID) 100 mg capsule, Take 100 mg by mouth, Disp: , Rfl:     omeprazole (PriLOSEC) 20 mg delayed release capsule, Take 20 mg by mouth daily, Disp: , Rfl:     warfarin (COUMADIN) 5 mg tablet, , Disp: , Rfl:     Current Allergies     Allergies as of 06/22/2024 -  "Reviewed 06/22/2024   Allergen Reaction Noted    Codeine GI Intolerance and Other (See Comments) 01/22/2013    Sulfa antibiotics Other (See Comments) and Rash 01/22/2013            The following portions of the patient's history were reviewed and updated as appropriate: allergies, current medications, past family history, past medical history, past social history, past surgical history and problem list.     Past Medical History:   Diagnosis Date    GERD (gastroesophageal reflux disease)     Heart failure (HCC)     High blood cholesterol     High blood pressure        Past Surgical History:   Procedure Laterality Date    APPENDECTOMY      BACK SURGERY      CATARACT EXTRACTION Bilateral     CHOLECYSTECTOMY      REPLACEMENT TOTAL KNEE Bilateral     TUBAL LIGATION         History reviewed. No pertinent family history.      Medications have been verified.        Objective   /81   Pulse 80   Temp 98.2 °F (36.8 °C)   Resp 18   Ht 5' 4\" (1.626 m)   Wt 95.6 kg (210 lb 12.8 oz)   SpO2 94%   BMI 36.18 kg/m²   No LMP recorded. Patient is postmenopausal.       Physical Exam     Physical Exam  Vitals and nursing note reviewed. Exam conducted with a chaperone present (Tech Sarah Josh).   Constitutional:       Appearance: She is well-developed.      Comments: Pale   HENT:      Head: Normocephalic and atraumatic.      Right Ear: External ear normal.      Left Ear: External ear normal.      Nose: Nose normal.   Eyes:      General: Lids are normal.      Conjunctiva/sclera: Conjunctivae normal.   Cardiovascular:      Rate and Rhythm: Normal rate and regular rhythm.   Pulmonary:      Effort: Pulmonary effort is normal.      Breath sounds: Normal breath sounds.   Abdominal:      General: Abdomen is flat. Bowel sounds are normal.      Palpations: Abdomen is soft.      Tenderness: There is no abdominal tenderness.   Genitourinary:     Labia:         Right: No rash or lesion (skin colored cysts b/l labia major. No tenderness or " discharge).         Left: No tenderness.       Vagina: No vaginal discharge or erythema.   Skin:     General: Skin is warm and dry.      Capillary Refill: Capillary refill takes less than 2 seconds.   Neurological:      Mental Status: She is alert.       EKG: NSR. Right bundle branch block. T wave inverted leads III, aVF, V1, V2, V3. Unable to compared to prior.

## 2024-06-22 NOTE — ED PROVIDER NOTES
History  Chief Complaint   Patient presents with    Possible UTI     Pt arrives with c/o generalized weakness and fatigue with burning during and after urination since Monday. Pt dx with UTI and put on macrobid however pt reports no improvement.      Patient is a 78-year-old female presenting to the emergency department complaining of generalized weakness with dysuria, recently diagnosed with UTI through PCP and placed on Macrobid, almost done with course of Macrobid and not feeling any better, reports some generalized fatigue, decreased energy, no chest pain or shortness of breath, no nausea vomiting or diarrhea, no fever        Prior to Admission Medications   Prescriptions Last Dose Informant Patient Reported? Taking?   Aspirin-Calcium Carbonate  MG TABS   Yes No   Sig: Take 81 mg by mouth   Cholecalciferol 25 MCG (1000 UT) capsule   Yes No   Sig: Take 1,000 Units by mouth daily   Fluticasone-Salmeterol (Advair) 250-50 mcg/dose inhaler   Yes No   Sig: INHALE 1 DOSE BY MOUTH TWICE DAILY   aspirin 81 mg chewable tablet   Yes No   Sig: Chew 81 mg daily   atorvastatin (LIPITOR) 40 mg tablet   Yes No   Sig: Take 40 mg by mouth daily   clotrimazole (GYNE-LOTRIMIN) 1 % vaginal cream   No No   Sig: Insert 1 applicator into the vagina 2 (two) times a day for 7 days   colestipol (COLESTID) 1 g tablet   Yes No   folic acid (FOLVITE) 1 mg tablet   Yes No   Sig: Take 1 mg by mouth daily   furosemide (LASIX) 40 mg tablet   Yes No   Sig: Take 40 mg by mouth daily   gabapentin (NEURONTIN) 800 mg tablet   Yes No   Sig: Take 800 mg by mouth 3 (three) times a day   lisinopril (ZESTRIL) 10 mg tablet   Yes No   Sig: Take 10 mg by mouth daily   metoprolol tartrate (LOPRESSOR) 25 mg tablet   Yes No   Sig: Take 25 mg by mouth every 12 (twelve) hours   montelukast (SINGULAIR) 10 mg tablet   Yes No   Sig: Take 10 mg by mouth   nitrofurantoin (MACROBID) 100 mg capsule   Yes No   Sig: Take 100 mg by mouth   omeprazole (PriLOSEC) 20 mg  delayed release capsule   Yes No   Sig: Take 20 mg by mouth daily   warfarin (COUMADIN) 5 mg tablet   Yes No      Facility-Administered Medications: None       Past Medical History:   Diagnosis Date    GERD (gastroesophageal reflux disease)     Heart failure (HCC)     High blood cholesterol     High blood pressure        Past Surgical History:   Procedure Laterality Date    APPENDECTOMY      BACK SURGERY      CATARACT EXTRACTION Bilateral     CHOLECYSTECTOMY      REPLACEMENT TOTAL KNEE Bilateral     TUBAL LIGATION         History reviewed. No pertinent family history.  I have reviewed and agree with the history as documented.    E-Cigarette/Vaping    E-Cigarette Use Never User      E-Cigarette/Vaping Substances     Social History     Tobacco Use    Smoking status: Never     Passive exposure: Never    Smokeless tobacco: Never   Vaping Use    Vaping status: Never Used   Substance Use Topics    Alcohol use: Not Currently    Drug use: Never       Review of Systems   Constitutional:  Positive for fatigue.   HENT: Negative.     Eyes: Negative.    Respiratory: Negative.     Cardiovascular: Negative.    Gastrointestinal: Negative.    Endocrine: Negative.    Genitourinary:  Positive for dysuria.   Musculoskeletal: Negative.    Skin: Negative.    Allergic/Immunologic: Negative.    Neurological: Negative.    Hematological: Negative.    Psychiatric/Behavioral: Negative.         Physical Exam  Physical Exam  Constitutional:       Appearance: She is well-developed.   HENT:      Head: Normocephalic and atraumatic.   Eyes:      Conjunctiva/sclera: Conjunctivae normal.      Pupils: Pupils are equal, round, and reactive to light.   Cardiovascular:      Rate and Rhythm: Normal rate.   Pulmonary:      Effort: Pulmonary effort is normal.   Abdominal:      Palpations: Abdomen is soft.   Musculoskeletal:         General: Normal range of motion.      Cervical back: Normal range of motion and neck supple.   Skin:     General: Skin is warm  and dry.   Neurological:      Mental Status: She is alert and oriented to person, place, and time.         Vital Signs  ED Triage Vitals [06/22/24 1549]   Temperature Pulse Respirations Blood Pressure SpO2   (!) 96.8 °F (36 °C) 92 17 126/60 94 %      Temp Source Heart Rate Source Patient Position - Orthostatic VS BP Location FiO2 (%)   Temporal Monitor Sitting Right arm --      Pain Score       2           Vitals:    06/22/24 1549   BP: 126/60   Pulse: 92   Patient Position - Orthostatic VS: Sitting         Visual Acuity      ED Medications  Medications   magnesium sulfate 2 g/50 mL IVPB (premix) 2 g (2 g Intravenous New Bag 6/22/24 1648)   levofloxacin (LEVAQUIN) tablet 750 mg (has no administration in time range)       Diagnostic Studies  Results Reviewed       Procedure Component Value Units Date/Time    HS Troponin 0hr (reflex protocol) [[id number]]  (Normal) Collected: 06/22/24 1607    Lab Status: Final result Specimen: Blood from Arm, Left Updated: 06/22/24 1638     hs TnI 0hr 7 ng/L     Comprehensive metabolic panel [[id number]]  (Abnormal) Collected: 06/22/24 1607    Lab Status: Final result Specimen: Blood from Arm, Left Updated: 06/22/24 1631     Sodium 141 mmol/L      Potassium 4.0 mmol/L      Chloride 107 mmol/L      CO2 25 mmol/L      ANION GAP 9 mmol/L      BUN 27 mg/dL      Creatinine 1.07 mg/dL      Glucose 97 mg/dL      Calcium 9.5 mg/dL      AST 14 U/L      ALT 9 U/L      Alkaline Phosphatase 61 U/L      Total Protein 7.6 g/dL      Albumin 4.0 g/dL      Total Bilirubin 0.70 mg/dL      eGFR 50 ml/min/1.73sq m     Narrative:      National Kid[employer] Foundation guidelines for Chronic Kidney Disease (CKD):     Stage 1 with normal or high GFR (GFR > 90 mL/min/1.73 square meters)    Stage 2 Mild CKD (GFR = 60-89 mL/min/1.73 square meters)    Stage 3A Moderate CKD (GFR = 45-59 mL/min/1.73 square meters)    Stage 3B Moderate CKD (GFR = 30-44 mL/min/1.73 square meters)    Stage 4 Severe CKD (GFR = 15-29  mL/min/1.73 square meters)    Stage 5 End Stage CKD (GFR <15 mL/min/1.73 square meters)  Note: GFR calculation is accurate only with a steady state creatinine    Lactic acid, plasma (w/reflex if result > 2.0) [[id number]]  (Normal) Collected: 06/22/24 1607    Lab Status: Final result Specimen: Blood from Arm, Left Updated: 06/22/24 1631     LACTIC ACID 1.3 mmol/L     Narrative:      Result may be elevated if tourniquet was used during collection.    Magnesium [[id number]]  (Abnormal) Collected: 06/22/24 1607    Lab Status: Final result Specimen: Blood from Arm, Left Updated: 06/22/24 1631     Magnesium 1.7 mg/dL     Protime-INR [[id number]]  (Abnormal) Collected: 06/22/24 1607    Lab Status: Final result Specimen: Blood from Arm, Left Updated: 06/22/24 1627     Protime 20.5 seconds      INR 1.72    APTT [[id number]]  (Normal) Collected: 06/22/24 1607    Lab Status: Final result Specimen: Blood from Arm, Left Updated: 06/22/24 1627     PTT 27 seconds     Blood culture #1 [[id number]] Collected: 06/22/24 1622    Lab Status: In process Specimen: Blood from Arm, Right Updated: 06/22/24 1624    CBC and differential [[id number]]  (Abnormal) Collected: 06/22/24 1607    Lab Status: Final result Specimen: Blood from Arm, Left Updated: 06/22/24 1616     WBC 8.80 Thousand/uL      RBC 3.50 Million/uL      Hemoglobin 11.5 g/dL      Hematocrit 36.9 %       fL      MCH 32.9 pg      MCHC 31.2 g/dL      RDW 13.6 %      MPV 10.6 fL      Platelets 170 Thousands/uL      nRBC 0 /100 WBCs      Segmented % 82 %      Immature Grans % 0 %      Lymphocytes % 9 %      Monocytes % 8 %      Eosinophils Relative 1 %      Basophils Relative 0 %      Absolute Neutrophils 7.20 Thousands/µL      Absolute Immature Grans 0.03 Thousand/uL      Absolute Lymphocytes 0.81 Thousands/µL      Absolute Monocytes 0.69 Thousand/µL      Eosinophils Absolute 0.04 Thousand/µL      Basophils Absolute 0.03 Thousands/µL     Blood culture #2 [[id number]] Collected:  06/22/24 1607    Lab Status: In process Specimen: Blood from Arm, Left Updated: 06/22/24 1613    B-Type Natriuretic Peptide(BNP) [[id number]] Collected: 06/22/24 1607    Lab Status: In process Specimen: Blood from Arm, Left Updated: 06/22/24 1612                   No orders to display              Procedures  Procedures         ED Course  ED Course as of 06/22/24 1709   Sat Jun 22, 2024   1650 Component 5 d ago  Culture 10,000 CFU/ml Proteus mirabilis Abnormal   [facility]  Susceptibility    Organism Antibiotic Method Susceptibility  Proteus mirabilis AMPICILLIN PORTER >=32.000: Resistant  Proteus mirabilis AMPICILLIN + SULBACTAM PORTER 16.000: Intermediate  Proteus mirabilis CEFAZOLIN PORTER 8.000: Sensitive  Proteus mirabilis CIPROFLOXACIN PORTER <=0.060: Sensitive  Proteus mirabilis GENTAMICIN PORTER <=1.000: Sensitive  Proteus mirabilis LEVOFLOXACIN PORTER <=0.120: Sensitive  Proteus mirabilis NITROFURANTOIN PORTER 128.000: Resistant  Proteus mirabilis PIPERACILLIN + TAZOBACTAM PORTER <=4.000: Sensitive  Proteus mirabilis TOBRAMYCIN PORTER <=1.000: Sensitive     1707 Comprehensive metabolic panel(!)   1707 HS Troponin 0hr (reflex protocol)   1707 APTT   1707 Magnesium(!)   1707 Protime-INR(!)   1707 Lactic acid, plasma (w/reflex if result > 2.0)   1707 CBC and differential(!)                               SBIRT 22yo+      Flowsheet Row Most Recent Value   Initial Alcohol Screen: US AUDIT-C     1. How often do you have a drink containing alcohol? 0 Filed at: 06/22/2024 1647   2. How many drinks containing alcohol do you have on a typical day you are drinking?  0 Filed at: 06/22/2024 1647   3b. FEMALE Any Age, or MALE 65+: How often do you have 4 or more drinks on one occassion? 0 Filed at: 06/22/2024 1647   Audit-C Score 0 Filed at: 06/22/2024 1647   RONNA: How many times in the past year have you...    Used an illegal drug or used a prescription medication for non-medical reasons? Never Filed at: 06/22/2024  1647                      Medical Decision Making  Patient presents for symptoms consistent with acute uncomplicated urinary tract infection.  No fever, vital signs are stable.  Patient is well-appearing and nonseptic appearing.  Low suspicion for acute pyelonephritis given lack of fever, CVA tenderness or systemic features.  Low suspicion for kidney stone or infected stone.  Low suspicion for ovarian torsion, PID or appendicitis.  Recent urine culture reveals antibiotic that patient was prescribed, nitrofurantoin, she was resistance, therefore will start patient on Levaquin, advise close follow-up for repeated INR checks as Coumadin and Levaquin can cause increased INR level, advise close follow-up with PCP or return if symptoms worsen      Problems Addressed:  UTI (urinary tract infection): acute illness or injury    Amount and/or Complexity of Data Reviewed  Labs: ordered. Decision-making details documented in ED Course.    Risk  Prescription drug management.             Disposition  Final diagnoses:   UTI (urinary tract infection)     Time reflects when diagnosis was documented in both MDM as applicable and the Disposition within this note       Time User Action Codes Description Comment    6/22/2024  5:03 PM Verenice Fox Add [N39.0] UTI (urinary tract infection)           ED Disposition       ED Disposition   Discharge    Condition   Stable    Date/Time   Sat Jun 22, 2024 1703    Comment   Mary Carmen Woo discharge to home/self care.                   Follow-up Information       Follow up With Specialties Details Why Contact Info    Maya Quezada MD Family Medicine In 2 days As needed [address]  [facility] [address] [phone]              Patient's Medications   Discharge Prescriptions    LEVOFLOXACIN (LEVAQUIN) 750 MG TABLET    Take 1 tablet (750 mg total) by mouth every 24 hours for 5 days       Start Date: 6/22/2024 End Date: 6/27/2024       Order Dose: 750 mg       Quantity: 5 tablet     Refills: 0       No discharge procedures on file.    PDMP Review       None            ED Provider  Electronically Signed by             Verenice Fox DO  06/22/24 7328

## 2024-06-24 LAB
ATRIAL RATE: 79 BPM
ATRIAL RATE: 93 BPM
P AXIS: 30 DEGREES
P AXIS: 55 DEGREES
PR INTERVAL: 144 MS
PR INTERVAL: 146 MS
QRS AXIS: 3 DEGREES
QRS AXIS: 35 DEGREES
QRSD INTERVAL: 124 MS
QRSD INTERVAL: 126 MS
QT INTERVAL: 384 MS
QT INTERVAL: 384 MS
QTC INTERVAL: 440 MS
QTC INTERVAL: 477 MS
T WAVE AXIS: -7 DEGREES
T WAVE AXIS: -7 DEGREES
VENTRICULAR RATE: 79 BPM
VENTRICULAR RATE: 93 BPM

## 2024-06-24 PROCEDURE — 93010 ELECTROCARDIOGRAM REPORT: CPT | Performed by: STUDENT IN AN ORGANIZED HEALTH CARE EDUCATION/TRAINING PROGRAM

## 2024-06-25 LAB — BACTERIA UR CULT: ABNORMAL

## 2024-06-27 LAB
BACTERIA BLD CULT: NORMAL
BACTERIA BLD CULT: NORMAL

## 2025-03-16 ENCOUNTER — HOSPITAL ENCOUNTER (INPATIENT)
Facility: HOSPITAL | Age: 80
LOS: 3 days | Discharge: HOME | DRG: 202 | End: 2025-03-19
Attending: EMERGENCY MEDICINE | Admitting: INTERNAL MEDICINE
Payer: COMMERCIAL

## 2025-03-16 ENCOUNTER — APPOINTMENT (EMERGENCY)
Dept: CT IMAGING | Facility: HOSPITAL | Age: 80
DRG: 202 | End: 2025-03-16
Payer: COMMERCIAL

## 2025-03-16 DIAGNOSIS — J10.1 INFLUENZA A: ICD-10-CM

## 2025-03-16 DIAGNOSIS — R04.2 HEMOPTYSIS: Primary | ICD-10-CM

## 2025-03-16 DIAGNOSIS — R79.89 ELEVATED TROPONIN: ICD-10-CM

## 2025-03-16 DIAGNOSIS — I10 HYPERTENSION: ICD-10-CM

## 2025-03-16 DIAGNOSIS — J40 BRONCHITIS: ICD-10-CM

## 2025-03-16 DIAGNOSIS — J45.41 MODERATE PERSISTENT ASTHMA WITH ACUTE EXACERBATION: ICD-10-CM

## 2025-03-16 PROBLEM — I27.82 CHRONIC PULMONARY EMBOLISM (HCC): Status: ACTIVE | Noted: 2025-03-16

## 2025-03-16 PROBLEM — I50.32 CHRONIC HEART FAILURE WITH PRESERVED EJECTION FRACTION (HCC): Status: ACTIVE | Noted: 2025-03-16

## 2025-03-16 PROBLEM — I16.0 HYPERTENSIVE URGENCY: Status: ACTIVE | Noted: 2025-03-16

## 2025-03-16 PROBLEM — G47.33 OSA (OBSTRUCTIVE SLEEP APNEA): Status: ACTIVE | Noted: 2025-03-16

## 2025-03-16 PROBLEM — J45.40 MODERATE PERSISTENT ASTHMA: Status: ACTIVE | Noted: 2025-03-16

## 2025-03-16 LAB
2HR DELTA HS TROPONIN: -35 NG/L
4HR DELTA HS TROPONIN: -49 NG/L
ALBUMIN SERPL BCG-MCNC: 4 G/DL (ref 3.5–5)
ALP SERPL-CCNC: 54 U/L (ref 34–104)
ALT SERPL W P-5'-P-CCNC: 9 U/L (ref 7–52)
ANION GAP SERPL CALCULATED.3IONS-SCNC: 11 MMOL/L (ref 4–13)
APTT PPP: 29 SECONDS (ref 23–34)
AST SERPL W P-5'-P-CCNC: 21 U/L (ref 13–39)
BASE EX.OXY STD BLDV CALC-SCNC: 68.7 % (ref 60–80)
BASE EXCESS BLDV CALC-SCNC: -2.6 MMOL/L
BASOPHILS # BLD AUTO: 0.02 THOUSANDS/ÂΜL (ref 0–0.1)
BASOPHILS NFR BLD AUTO: 0 % (ref 0–1)
BILIRUB SERPL-MCNC: 0.8 MG/DL (ref 0.2–1)
BNP SERPL-MCNC: 487 PG/ML (ref 0–100)
BUN SERPL-MCNC: 25 MG/DL (ref 5–25)
CALCIUM SERPL-MCNC: 9.1 MG/DL (ref 8.4–10.2)
CARDIAC TROPONIN I PNL SERPL HS: 263 NG/L (ref ?–50)
CARDIAC TROPONIN I PNL SERPL HS: 277 NG/L (ref ?–50)
CARDIAC TROPONIN I PNL SERPL HS: 312 NG/L (ref ?–50)
CHLORIDE SERPL-SCNC: 109 MMOL/L (ref 96–108)
CO2 SERPL-SCNC: 23 MMOL/L (ref 21–32)
CREAT SERPL-MCNC: 0.96 MG/DL (ref 0.6–1.3)
EOSINOPHIL # BLD AUTO: 0.02 THOUSAND/ÂΜL (ref 0–0.61)
EOSINOPHIL NFR BLD AUTO: 0 % (ref 0–6)
ERYTHROCYTE [DISTWIDTH] IN BLOOD BY AUTOMATED COUNT: 14.2 % (ref 11.6–15.1)
FLUAV AG UPPER RESP QL IA.RAPID: NEGATIVE
FLUBV AG UPPER RESP QL IA.RAPID: NEGATIVE
GFR SERPL CREATININE-BSD FRML MDRD: 57 ML/MIN/1.73SQ M
GLUCOSE SERPL-MCNC: 109 MG/DL (ref 65–140)
HCO3 BLDV-SCNC: 21.5 MMOL/L (ref 24–30)
HCT VFR BLD AUTO: 35.2 % (ref 34.8–46.1)
HGB BLD-MCNC: 11.2 G/DL (ref 11.5–15.4)
IMM GRANULOCYTES # BLD AUTO: 0.03 THOUSAND/UL (ref 0–0.2)
IMM GRANULOCYTES NFR BLD AUTO: 0 % (ref 0–2)
INR PPP: 2.61 (ref 0.85–1.19)
LYMPHOCYTES # BLD AUTO: 0.98 THOUSANDS/ÂΜL (ref 0.6–4.47)
LYMPHOCYTES NFR BLD AUTO: 13 % (ref 14–44)
MAGNESIUM SERPL-MCNC: 1.6 MG/DL (ref 1.9–2.7)
MCH RBC QN AUTO: 32.7 PG (ref 26.8–34.3)
MCHC RBC AUTO-ENTMCNC: 31.8 G/DL (ref 31.4–37.4)
MCV RBC AUTO: 103 FL (ref 82–98)
MONOCYTES # BLD AUTO: 0.94 THOUSAND/ÂΜL (ref 0.17–1.22)
MONOCYTES NFR BLD AUTO: 12 % (ref 4–12)
NEUTROPHILS # BLD AUTO: 5.63 THOUSANDS/ÂΜL (ref 1.85–7.62)
NEUTS SEG NFR BLD AUTO: 75 % (ref 43–75)
NRBC BLD AUTO-RTO: 0 /100 WBCS
O2 CT BLDV-SCNC: 11.9 ML/DL
PCO2 BLDV: 35.1 MM HG (ref 42–50)
PH BLDV: 7.41 [PH] (ref 7.3–7.4)
PLATELET # BLD AUTO: 172 THOUSANDS/UL (ref 149–390)
PMV BLD AUTO: 11.2 FL (ref 8.9–12.7)
PO2 BLDV: 36.6 MM HG (ref 35–45)
POTASSIUM SERPL-SCNC: 3.7 MMOL/L (ref 3.5–5.3)
PROT SERPL-MCNC: 6.7 G/DL (ref 6.4–8.4)
PROTHROMBIN TIME: 28 SECONDS (ref 12.3–15)
RBC # BLD AUTO: 3.42 MILLION/UL (ref 3.81–5.12)
SARS-COV+SARS-COV-2 AG RESP QL IA.RAPID: NEGATIVE
SODIUM SERPL-SCNC: 143 MMOL/L (ref 135–147)
WBC # BLD AUTO: 7.62 THOUSAND/UL (ref 4.31–10.16)

## 2025-03-16 PROCEDURE — 87804 INFLUENZA ASSAY W/OPTIC: CPT | Performed by: EMERGENCY MEDICINE

## 2025-03-16 PROCEDURE — 36415 COLL VENOUS BLD VENIPUNCTURE: CPT | Performed by: EMERGENCY MEDICINE

## 2025-03-16 PROCEDURE — 94664 DEMO&/EVAL PT USE INHALER: CPT

## 2025-03-16 PROCEDURE — 99285 EMERGENCY DEPT VISIT HI MDM: CPT | Performed by: EMERGENCY MEDICINE

## 2025-03-16 PROCEDURE — 99223 1ST HOSP IP/OBS HIGH 75: CPT | Performed by: INTERNAL MEDICINE

## 2025-03-16 PROCEDURE — 85730 THROMBOPLASTIN TIME PARTIAL: CPT | Performed by: EMERGENCY MEDICINE

## 2025-03-16 PROCEDURE — 84484 ASSAY OF TROPONIN QUANT: CPT | Performed by: INTERNAL MEDICINE

## 2025-03-16 PROCEDURE — 80053 COMPREHEN METABOLIC PANEL: CPT | Performed by: EMERGENCY MEDICINE

## 2025-03-16 PROCEDURE — 96376 TX/PRO/DX INJ SAME DRUG ADON: CPT

## 2025-03-16 PROCEDURE — 87040 BLOOD CULTURE FOR BACTERIA: CPT | Performed by: EMERGENCY MEDICINE

## 2025-03-16 PROCEDURE — 96374 THER/PROPH/DIAG INJ IV PUSH: CPT

## 2025-03-16 PROCEDURE — 94760 N-INVAS EAR/PLS OXIMETRY 1: CPT

## 2025-03-16 PROCEDURE — 85025 COMPLETE CBC W/AUTO DIFF WBC: CPT | Performed by: EMERGENCY MEDICINE

## 2025-03-16 PROCEDURE — 85610 PROTHROMBIN TIME: CPT | Performed by: EMERGENCY MEDICINE

## 2025-03-16 PROCEDURE — 96365 THER/PROPH/DIAG IV INF INIT: CPT

## 2025-03-16 PROCEDURE — 82805 BLOOD GASES W/O2 SATURATION: CPT | Performed by: EMERGENCY MEDICINE

## 2025-03-16 PROCEDURE — 93005 ELECTROCARDIOGRAM TRACING: CPT

## 2025-03-16 PROCEDURE — 87811 SARS-COV-2 COVID19 W/OPTIC: CPT | Performed by: EMERGENCY MEDICINE

## 2025-03-16 PROCEDURE — 94640 AIRWAY INHALATION TREATMENT: CPT

## 2025-03-16 PROCEDURE — 99285 EMERGENCY DEPT VISIT HI MDM: CPT

## 2025-03-16 PROCEDURE — 84484 ASSAY OF TROPONIN QUANT: CPT | Performed by: EMERGENCY MEDICINE

## 2025-03-16 PROCEDURE — 83735 ASSAY OF MAGNESIUM: CPT | Performed by: EMERGENCY MEDICINE

## 2025-03-16 PROCEDURE — 83880 ASSAY OF NATRIURETIC PEPTIDE: CPT | Performed by: EMERGENCY MEDICINE

## 2025-03-16 PROCEDURE — 71275 CT ANGIOGRAPHY CHEST: CPT

## 2025-03-16 PROCEDURE — 96375 TX/PRO/DX INJ NEW DRUG ADDON: CPT

## 2025-03-16 RX ORDER — IPRATROPIUM BROMIDE AND ALBUTEROL SULFATE 2.5; .5 MG/3ML; MG/3ML
3 SOLUTION RESPIRATORY (INHALATION)
Status: DISCONTINUED | OUTPATIENT
Start: 2025-03-17 | End: 2025-03-19 | Stop reason: HOSPADM

## 2025-03-16 RX ORDER — LABETALOL HYDROCHLORIDE 5 MG/ML
10 INJECTION, SOLUTION INTRAVENOUS ONCE
Status: COMPLETED | OUTPATIENT
Start: 2025-03-16 | End: 2025-03-16

## 2025-03-16 RX ORDER — DEXAMETHASONE SODIUM PHOSPHATE 10 MG/ML
8 INJECTION, SOLUTION INTRAMUSCULAR; INTRAVENOUS ONCE
Status: COMPLETED | OUTPATIENT
Start: 2025-03-16 | End: 2025-03-16

## 2025-03-16 RX ORDER — HYDRALAZINE HYDROCHLORIDE 20 MG/ML
5 INJECTION INTRAMUSCULAR; INTRAVENOUS EVERY 6 HOURS PRN
Status: DISCONTINUED | OUTPATIENT
Start: 2025-03-16 | End: 2025-03-19 | Stop reason: HOSPADM

## 2025-03-16 RX ORDER — METOPROLOL TARTRATE 25 MG/1
25 TABLET, FILM COATED ORAL EVERY 12 HOURS SCHEDULED
Status: DISCONTINUED | OUTPATIENT
Start: 2025-03-16 | End: 2025-03-16

## 2025-03-16 RX ORDER — ONDANSETRON 2 MG/ML
4 INJECTION INTRAMUSCULAR; INTRAVENOUS EVERY 6 HOURS PRN
Status: DISCONTINUED | OUTPATIENT
Start: 2025-03-16 | End: 2025-03-19 | Stop reason: HOSPADM

## 2025-03-16 RX ORDER — HYDRALAZINE HYDROCHLORIDE 20 MG/ML
10 INJECTION INTRAMUSCULAR; INTRAVENOUS ONCE
Status: COMPLETED | OUTPATIENT
Start: 2025-03-16 | End: 2025-03-16

## 2025-03-16 RX ORDER — FOLIC ACID 1 MG/1
1 TABLET ORAL DAILY
Status: DISCONTINUED | OUTPATIENT
Start: 2025-03-17 | End: 2025-03-19 | Stop reason: HOSPADM

## 2025-03-16 RX ORDER — MAGNESIUM SULFATE 1 G/100ML
1 INJECTION INTRAVENOUS ONCE
Status: COMPLETED | OUTPATIENT
Start: 2025-03-16 | End: 2025-03-16

## 2025-03-16 RX ORDER — LISINOPRIL 20 MG/1
20 TABLET ORAL DAILY
Status: DISCONTINUED | OUTPATIENT
Start: 2025-03-17 | End: 2025-03-19 | Stop reason: HOSPADM

## 2025-03-16 RX ORDER — ATORVASTATIN CALCIUM 40 MG/1
40 TABLET, FILM COATED ORAL
Status: DISCONTINUED | OUTPATIENT
Start: 2025-03-16 | End: 2025-03-19 | Stop reason: HOSPADM

## 2025-03-16 RX ORDER — ALBUTEROL SULFATE 0.83 MG/ML
2.5 SOLUTION RESPIRATORY (INHALATION) ONCE
Status: COMPLETED | OUTPATIENT
Start: 2025-03-16 | End: 2025-03-16

## 2025-03-16 RX ORDER — FLUTICASONE FUROATE AND VILANTEROL 200; 25 UG/1; UG/1
1 POWDER RESPIRATORY (INHALATION) DAILY
Status: DISCONTINUED | OUTPATIENT
Start: 2025-03-17 | End: 2025-03-19 | Stop reason: HOSPADM

## 2025-03-16 RX ORDER — MONTELUKAST SODIUM 10 MG/1
10 TABLET ORAL
Status: DISCONTINUED | OUTPATIENT
Start: 2025-03-16 | End: 2025-03-19 | Stop reason: HOSPADM

## 2025-03-16 RX ORDER — GUAIFENESIN 600 MG/1
600 TABLET, EXTENDED RELEASE ORAL EVERY 12 HOURS SCHEDULED
Status: DISCONTINUED | OUTPATIENT
Start: 2025-03-16 | End: 2025-03-19 | Stop reason: HOSPADM

## 2025-03-16 RX ORDER — IPRATROPIUM BROMIDE AND ALBUTEROL SULFATE 2.5; .5 MG/3ML; MG/3ML
3 SOLUTION RESPIRATORY (INHALATION)
Status: DISCONTINUED | OUTPATIENT
Start: 2025-03-16 | End: 2025-03-16

## 2025-03-16 RX ORDER — PANTOPRAZOLE SODIUM 40 MG/1
40 TABLET, DELAYED RELEASE ORAL
Status: DISCONTINUED | OUTPATIENT
Start: 2025-03-17 | End: 2025-03-19 | Stop reason: HOSPADM

## 2025-03-16 RX ORDER — ACETAMINOPHEN 325 MG/1
650 TABLET ORAL EVERY 6 HOURS PRN
Status: DISCONTINUED | OUTPATIENT
Start: 2025-03-16 | End: 2025-03-19 | Stop reason: HOSPADM

## 2025-03-16 RX ORDER — DOXYCYCLINE 100 MG/1
100 CAPSULE ORAL EVERY 12 HOURS SCHEDULED
Status: DISCONTINUED | OUTPATIENT
Start: 2025-03-16 | End: 2025-03-19 | Stop reason: HOSPADM

## 2025-03-16 RX ORDER — METHYLPREDNISOLONE SODIUM SUCCINATE 40 MG/ML
40 INJECTION, POWDER, LYOPHILIZED, FOR SOLUTION INTRAMUSCULAR; INTRAVENOUS EVERY 8 HOURS SCHEDULED
Status: DISCONTINUED | OUTPATIENT
Start: 2025-03-16 | End: 2025-03-17

## 2025-03-16 RX ORDER — GABAPENTIN 400 MG/1
800 CAPSULE ORAL 3 TIMES DAILY
Status: DISCONTINUED | OUTPATIENT
Start: 2025-03-16 | End: 2025-03-19 | Stop reason: HOSPADM

## 2025-03-16 RX ORDER — CARVEDILOL 12.5 MG/1
12.5 TABLET ORAL 2 TIMES DAILY WITH MEALS
Status: DISCONTINUED | OUTPATIENT
Start: 2025-03-17 | End: 2025-03-19 | Stop reason: HOSPADM

## 2025-03-16 RX ADMIN — HYDRALAZINE HYDROCHLORIDE 10 MG: 20 INJECTION INTRAMUSCULAR; INTRAVENOUS at 14:37

## 2025-03-16 RX ADMIN — ATORVASTATIN CALCIUM 40 MG: 40 TABLET, FILM COATED ORAL at 21:21

## 2025-03-16 RX ADMIN — IPRATROPIUM BROMIDE AND ALBUTEROL SULFATE 3 ML: .5; 3 SOLUTION RESPIRATORY (INHALATION) at 19:16

## 2025-03-16 RX ADMIN — GUAIFENESIN 600 MG: 600 TABLET, EXTENDED RELEASE ORAL at 21:21

## 2025-03-16 RX ADMIN — MAGNESIUM SULFATE HEPTAHYDRATE 1 G: 1 INJECTION, SOLUTION INTRAVENOUS at 14:35

## 2025-03-16 RX ADMIN — ALBUTEROL SULFATE 2.5 MG: 2.5 SOLUTION RESPIRATORY (INHALATION) at 13:43

## 2025-03-16 RX ADMIN — IOHEXOL 72 ML: 350 INJECTION, SOLUTION INTRAVENOUS at 14:29

## 2025-03-16 RX ADMIN — DOXYCYCLINE 100 MG: 100 CAPSULE ORAL at 21:21

## 2025-03-16 RX ADMIN — DEXAMETHASONE SODIUM PHOSPHATE 8 MG: 10 INJECTION, SOLUTION INTRAMUSCULAR; INTRAVENOUS at 13:43

## 2025-03-16 RX ADMIN — HYDRALAZINE HYDROCHLORIDE 10 MG: 20 INJECTION INTRAMUSCULAR; INTRAVENOUS at 15:26

## 2025-03-16 RX ADMIN — LABETALOL HYDROCHLORIDE 10 MG: 5 INJECTION, SOLUTION INTRAVENOUS at 15:26

## 2025-03-16 RX ADMIN — METHYLPREDNISOLONE SODIUM SUCCINATE 40 MG: 40 INJECTION, POWDER, FOR SOLUTION INTRAMUSCULAR; INTRAVENOUS at 21:21

## 2025-03-16 RX ADMIN — GABAPENTIN 800 MG: 400 CAPSULE ORAL at 21:21

## 2025-03-16 RX ADMIN — MONTELUKAST 10 MG: 10 TABLET, FILM COATED ORAL at 21:21

## 2025-03-16 RX ADMIN — ACETAMINOPHEN 650 MG: 325 TABLET ORAL at 22:59

## 2025-03-16 NOTE — RESPIRATORY THERAPY NOTE
RT Protocol Note  Mary Carmen Woo 79 y.o. female MRN: [id number]  Unit/Bed#: -01 Encounter: [id number]    Assessment    Active Problems:    Moderate persistent asthma with acute exacerbation    Hypertensive urgency    Elevated troponin    Chronic heart failure with preserved ejection fraction (HCC)    Chronic pulmonary embolism (HCC)    LYDIA (obstructive sleep apnea)      Home Pulmonary Medications:  Advair       Past Medical History:   Diagnosis Date    GERD (gastroesophageal reflux disease)     Heart failure (HCC)     High blood cholesterol     High blood pressure      Social History     Socioeconomic History    Marital status: /Civil Union     Spouse name: None    Number of children: None    Years of education: None    Highest education level: None   Occupational History    None   Tobacco Use    Smoking status: Never     Passive exposure: Never    Smokeless tobacco: Never   Vaping Use    Vaping status: Never Used   Substance and Sexual Activity    Alcohol use: Not Currently    Drug use: Never    Sexual activity: None   Other Topics Concern    None   Social History Narrative    None     Social Drivers of Health     Financial Resource Strain: Low Risk  (9/23/2024)    Received from [employer]    Overall Financial Resource Strain (CARDIA)     Difficulty of Paying Living Expenses: Not very hard   Food Insecurity: Not on file   Transportation Needs: No Transportation Needs (8/14/2024)    Received from [employer]    OASIS : Transportation     Lack of Transportation (Medical): No     Lack of Transportation (Non-Medical): No     Patient Unable or Declines to Respond: No   Physical Activity: Not on file   Stress: Not on file   Social Connections: Feeling Socially Integrated (8/14/2024)    Received from [employer]    OASIS : Social Isolation     Frequency of experiencing loneliness or isolation: Never   Intimate Partner Violence: Not on file   Housing Stability: Low Risk  (9/23/2024)    Received  "from [facility] Stability Vital Sign     RETIRED Do you struggle to pay the rent or mortgage on time?: No     RETIRED Last year, How many different places did you live?: 0     RETIRED Is your current living situation unsteady?   (I.e. Staying with others,  in a hotel, in a shelter, living outside: on the street, on a beach, in a car, abandoned building, bus, train statio...: No       Subjective         Objective    Physical Exam:   Assessment Type: Assess only  General Appearance: Awake, Alert  Respiratory Pattern: Normal  Chest Assessment: Chest expansion symmetrical  Bilateral Breath Sounds: Diminished  O2 Device: RA    Vitals:  Blood pressure (!) 190/85, pulse 77, temperature 98.2 °F (36.8 °C), temperature source Temporal, resp. rate 20, height 5' 4\" (1.626 m), weight 85.3 kg (188 lb), SpO2 95%.          Imaging and other studies:     O2 Device: RA     Plan    Respiratory Plan: Mild Distress pathway        Resp Comments: (P) Pt admitted due to asthma. Pt has hx takes advair at home. She has also tested positive for influenza A. Pt denies SOB just has nasal drainage. Pt is supposed to use CPAP to sleep she said she hasn't lately since being sick and would just like to use O2 to sleep. I did offer our maching however with the nasal drainage she refused.   "

## 2025-03-16 NOTE — ED PROVIDER NOTES
Time reflects when diagnosis was documented in both MDM as applicable and the Disposition within this note       Time User Action Codes Description Comment    3/16/2025  2:21 PM Thomas Sandhu Add [R04.2] Hemoptysis     3/16/2025  2:21 PM Thomas Sandhu Add [I10] Hypertension     3/16/2025  2:21 PM Thomas Sandhu Add [R79.89] Elevated troponin     3/16/2025  3:49 PM Thomas Sandhu Add [J10.1] Influenza A     3/16/2025  3:49 PM Thomas Sandhu Add [J40] Bronchitis           ED Disposition       ED Disposition   Admit    Condition   Stable    Date/Time   Sun Mar 16, 2025  3:49 PM    Comment   Case was discussed with Dr. Bhatti and the patient's admission status was agreed to be Admission Status: inpatient status to the service of Dr. Bhatti.               Assessment & Plan       Medical Decision Making  Problems Addressed:  Bronchitis: acute illness or injury  Elevated troponin: acute illness or injury  Hemoptysis: acute illness or injury  Hypertension: acute illness or injury  Influenza A: acute illness or injury    Amount and/or Complexity of Data Reviewed  Labs: ordered. Decision-making details documented in ED Course.  Radiology: ordered. Decision-making details documented in ED Course.  ECG/medicine tests: ordered and independent interpretation performed. Decision-making details documented in ED Course.    Risk  Prescription drug management.  Decision regarding hospitalization.        ED Course as of 03/16/25 1602   Sun Mar 16, 2025   1601 Spoke with Dr. Bhatti hospitalist on-call reviewed case and findings in the emergency department management thus far accepts for admission.       Medications   dexamethasone (PF) (DECADRON) injection 8 mg (8 mg Intravenous Given 3/16/25 1343)   albuterol inhalation solution 2.5 mg (2.5 mg Nebulization Given 3/16/25 1343)   magnesium sulfate IVPB (premix) SOLN 1 g (0 g Intravenous Stopped 3/16/25 1535)   hydrALAZINE (APRESOLINE) injection 10 mg (10 mg Intravenous Given 3/16/25  1437)   iohexol (OMNIPAQUE) 350 MG/ML injection (MULTI-DOSE) 72 mL (72 mL Intravenous Given 3/16/25 1429)   hydrALAZINE (APRESOLINE) injection 10 mg (10 mg Intravenous Given 3/16/25 1526)   labetalol (NORMODYNE) injection 10 mg (10 mg Intravenous Given 3/16/25 1526)       ED Risk Strat Scores                            SBIRT 20yo+      Flowsheet Row Most Recent Value   Initial Alcohol Screen: US AUDIT-C     1. How often do you have a drink containing alcohol? 0 Filed at: 03/16/2025 1324   2. How many drinks containing alcohol do you have on a typical day you are drinking?  0 Filed at: 03/16/2025 1324   3b. FEMALE Any Age, or MALE 65+: How often do you have 4 or more drinks on one occassion? 0 Filed at: 03/16/2025 1324   Audit-C Score 0 Filed at: 03/16/2025 1324   RONNA: How many times in the past year have you...    Used an illegal drug or used a prescription medication for non-medical reasons? Never Filed at: 03/16/2025 1324                            History of Present Illness       Chief Complaint   Patient presents with    Cough     Pt presented to this ED from home c/o cough over past week noticed blood in sputum past 2 days. Pt seen in other ED last week per cough. Pt taking warfarin.       Past Medical History:   Diagnosis Date    GERD (gastroesophageal reflux disease)     Heart failure (HCC)     High blood cholesterol     High blood pressure       Past Surgical History:   Procedure Laterality Date    APPENDECTOMY      BACK SURGERY      CATARACT EXTRACTION Bilateral     CHOLECYSTECTOMY      REPLACEMENT TOTAL KNEE Bilateral     TUBAL LIGATION        History reviewed. No pertinent family history.   Social History     Tobacco Use    Smoking status: Never     Passive exposure: Never    Smokeless tobacco: Never   Vaping Use    Vaping status: Never Used   Substance Use Topics    Alcohol use: Not Currently    Drug use: Never      E-Cigarette/Vaping    E-Cigarette Use Never User       E-Cigarette/Vaping Substances       I have reviewed and agree with the history as documented.     Patient is a 79-year-old female history of GERD heart failure hypertension hyperlipidemia DVT and pulmonary embolism on Coumadin presents to the emergency department complaint of cough shortness of breath general weakness and fatigue and coughing up mucus streaked with blood.  Symptoms started about 3 days ago was seen at [facility] ED and diagnosed with influenza A patient reports they did not prescribe her anything and sent her home.        History provided by:  Patient and spouse  Cough  Cough characteristics:  Harsh  Sputum characteristics:  Bloody and yellow  Associated symptoms: fever, shortness of breath and wheezing    Associated symptoms: no chest pain        Review of Systems   Constitutional:  Positive for fatigue and fever.   HENT:  Positive for congestion and postnasal drip.    Respiratory:  Positive for cough, shortness of breath and wheezing.    Cardiovascular:  Negative for chest pain.   Gastrointestinal:  Negative for abdominal pain, nausea and vomiting.   All other systems reviewed and are negative.          Objective       ED Triage Vitals [03/16/25 1301]   Temperature Pulse Blood Pressure Respirations SpO2 Patient Position - Orthostatic VS   99.4 °F (37.4 °C) 79 (!) 217/120 18 96 % --      Temp src Heart Rate Source BP Location FiO2 (%) Pain Score    -- Monitor -- -- --      Vitals      Date and Time Temp Pulse SpO2 Resp BP Pain Score FACES Pain Rating User   03/16/25 1600 -- 77 97 % 24 197/84 -- -- MD   03/16/25 1530 -- 74 97 % 29 198/88 -- -- MD   03/16/25 1526 -- -- -- -- 221/95 -- -- MD   03/16/25 1515 -- 78 97 % 22 221/95 -- -- MD   03/16/25 1500 -- 79 99 % 22 222/86 -- -- MD   03/16/25 1445 -- 78 98 % 34 212/95 -- -- MD   03/16/25 1436 -- 70 98 % 26 232/103 -- -- MD   03/16/25 1326 -- 87 95 % 20 219/97 -- -- MD   03/16/25 1301 99.4 °F (37.4 °C) 79 96 % 18 217/120 -- -- AC            Physical Exam  Vitals and nursing note  reviewed.   Constitutional:       General: She is not in acute distress.     Appearance: Normal appearance.   HENT:      Head: Normocephalic and atraumatic.      Nose: Congestion present.   Eyes:      Conjunctiva/sclera: Conjunctivae normal.   Cardiovascular:      Rate and Rhythm: Normal rate and regular rhythm.   Pulmonary:      Effort: Pulmonary effort is normal. No respiratory distress.      Breath sounds: Wheezing present.   Skin:     General: Skin is dry.   Neurological:      General: No focal deficit present.      Mental Status: She is alert and oriented to person, place, and time.         Results Reviewed       Procedure Component Value Units Date/Time    HS Troponin I 2hr [[id number]] Collected: 03/16/25 1554    Lab Status: In process Specimen: Blood from Arm, Right Updated: 03/16/25 1559    HS Troponin I 4hr [[id number]]     Lab Status: No result Specimen: Blood     FLU/COVID Rapid Antigen (30 min. TAT) - Preferred screening test in ED [[id number]]  (Normal) Collected: 03/16/25 1341    Lab Status: Final result Specimen: Nares from Nose Updated: 03/16/25 1426     SARS COV Rapid Antigen Negative     Influenza A Rapid Antigen Negative     Influenza B Rapid Antigen Negative    Narrative:      This test has been performed using the Quidel Nora 2 FLU+SARS Antigen test under the Emergency Use Authorization (EUA). This test has been validated by the  and verified by the performing laboratory. The Nora uses lateral flow immunofluorescent sandwich assay to detect SARS-COV, Influenza A and Influenza B Antigen.     The Quidel Nora 2 SARS Antigen test does not differentiate between SARS-CoV and SARS-CoV-2.     Negative results are presumptive and may be confirmed with a molecular assay, if necessary, for patient management. Negative results do not rule out SARS-CoV-2 or influenza infection and should not be used as the sole basis for treatment or patient management decisions. A negative test result may occur  if the level of antigen in a sample is below the limit of detection of this test.     Positive results are indicative of the presence of viral antigens, but do not rule out bacterial infection or co-infection with other viruses.     All test results should be used as an adjunct to clinical observations and other information available to the provider.    FOR PEDIATRIC PATIENTS - copy/paste COVID Guidelines URL to browser: [email or web address]    B-Type Natriuretic Peptide(BNP) [[id number]]  (Abnormal) Collected: 03/16/25 1341    Lab Status: Final result Specimen: Blood from Arm, Right Updated: 03/16/25 1425      pg/mL     HS Troponin 0hr (reflex protocol) [[id number]]  (Abnormal) Collected: 03/16/25 1341    Lab Status: Final result Specimen: Blood from Arm, Right Updated: 03/16/25 1416     hs TnI 0hr 312 ng/L     Protime-INR [[id number]]  (Abnormal) Collected: 03/16/25 1341    Lab Status: Final result Specimen: Blood from Arm, Right Updated: 03/16/25 1413     Protime 28.0 seconds      INR 2.61    Narrative:      INR Therapeutic Range    Indication                                             INR Range      Atrial Fibrillation                                               2.0-3.0  Hypercoagulable State                                    2.0.2.3  Left Ventricular Asist Device                            2.0-3.0  Mechanical Heart Valve                                  -    Aortic(with afib, MI, embolism, HF, LA enlargement,    and/or coagulopathy)                                     2.0-3.0 (2.5-3.5)     Mitral                                                             2.5-3.5  Prosthetic/Bioprosthetic Heart Valve               2.0-3.0  Venous thromboembolism (VTE: VT, PE        2.0-3.0    APTT [[id number]]  (Normal) Collected: 03/16/25 1341    Lab Status: Final result Specimen: Blood from Arm, Right Updated: 03/16/25 1413     PTT 29 seconds     Comprehensive  metabolic panel [[id number]]  (Abnormal) Collected: 03/16/25 1341    Lab Status: Final result Specimen: Blood from Arm, Right Updated: 03/16/25 1410     Sodium 143 mmol/L      Potassium 3.7 mmol/L      Chloride 109 mmol/L      CO2 23 mmol/L      ANION GAP 11 mmol/L      BUN 25 mg/dL      Creatinine 0.96 mg/dL      Glucose 109 mg/dL      Calcium 9.1 mg/dL      AST 21 U/L      ALT 9 U/L      Alkaline Phosphatase 54 U/L      Total Protein 6.7 g/dL      Albumin 4.0 g/dL      Total Bilirubin 0.80 mg/dL      eGFR 57 ml/min/1.73sq m     Narrative:      National Kidney Disease Foundation guidelines for Chronic Kidney Disease (CKD):     Stage 1 with normal or high GFR (GFR > 90 mL/min/1.73 square meters)    Stage 2 Mild CKD (GFR = 60-89 mL/min/1.73 square meters)    Stage 3A Moderate CKD (GFR = 45-59 mL/min/1.73 square meters)    Stage 3B Moderate CKD (GFR = 30-44 mL/min/1.73 square meters)    Stage 4 Severe CKD (GFR = 15-29 mL/min/1.73 square meters)    Stage 5 End Stage CKD (GFR <15 mL/min/1.73 square meters)  Note: GFR calculation is accurate only with a steady state creatinine    Magnesium [[id number]]  (Abnormal) Collected: 03/16/25 1341    Lab Status: Final result Specimen: Blood from Arm, Right Updated: 03/16/25 1410     Magnesium 1.6 mg/dL     Blood culture #1 [[id number]] Collected: 03/16/25 1350    Lab Status: In process Specimen: Blood from Hand, Right Updated: 03/16/25 1355    Blood gas, venous [[id number]]  (Abnormal) Collected: 03/16/25 1341    Lab Status: Final result Specimen: Blood from Arm, Right Updated: 03/16/25 1353     pH, Jaret 7.406     pCO2, Jaret 35.1 mm Hg      pO2, Jaret 36.6 mm Hg      HCO3, Jaret 21.5 mmol/L      Base Excess, Jaret -2.6 mmol/L      O2 Content, Jaret 11.9 ml/dL      O2 HGB, VENOUS 68.7 %     CBC and differential [[id number]]  (Abnormal) Collected: 03/16/25 1341    Lab Status: Final result Specimen: Blood from Arm, Right Updated: 03/16/25 1349     WBC 7.62 Thousand/uL      RBC 3.42 Million/uL       Hemoglobin 11.2 g/dL      Hematocrit 35.2 %       fL      MCH 32.7 pg      MCHC 31.8 g/dL      RDW 14.2 %      MPV 11.2 fL      Platelets 172 Thousands/uL      nRBC 0 /100 WBCs      Segmented % 75 %      Immature Grans % 0 %      Lymphocytes % 13 %      Monocytes % 12 %      Eosinophils Relative 0 %      Basophils Relative 0 %      Absolute Neutrophils 5.63 Thousands/µL      Absolute Immature Grans 0.03 Thousand/uL      Absolute Lymphocytes 0.98 Thousands/µL      Absolute Monocytes 0.94 Thousand/µL      Eosinophils Absolute 0.02 Thousand/µL      Basophils Absolute 0.02 Thousands/µL     Blood culture #2 [[id number]] Collected: 03/16/25 1341    Lab Status: In process Specimen: Blood from Arm, Right Updated: 03/16/25 1345            CTA chest pe study   Final Interpretation by Brayan Wynn MD (03/16 9296)      1.  No pulmonary artery emboli.   2.  Coarsened chronic interstitial lung markings predominantly in the lung bases, similar to the prior exam. No new consolidation.   3.  Prominent mediastinal and right hilar nodes may be reactive. Consider 3-month chest CT follow-up to ensure stability/resolution.                  Workstation performed: [id number]             ECG 12 Lead Documentation Only    Date/Time: 3/16/2025 3:27 PM    Performed by: Thomas Sandhu DO  Authorized by: Thomas Sandhu DO    ECG reviewed by me, the ED Provider: yes    Patient location:  ED  Previous ECG:     Comparison to cardiac monitor: Yes    Rate:     ECG rate:  77    ECG rate assessment: normal    Rhythm:     Rhythm: sinus rhythm    QRS:     QRS axis:  Left    QRS intervals:  Wide  Conduction:     Conduction: abnormal      Abnormal conduction: complete RBBB    ST segments:     ST segments:  Non-specific  T waves:     T waves: non-specific        ED Medication and Procedure Management   Prior to Admission Medications   Prescriptions Last Dose Informant Patient Reported? Taking?   Aspirin-Calcium Carbonate  MG TABS   Yes No    Sig: Take 81 mg by mouth   Cholecalciferol 25 MCG (1000 UT) capsule   Yes No   Sig: Take 1,000 Units by mouth daily   Fluticasone-Salmeterol (Advair) 250-50 mcg/dose inhaler   Yes No   Sig: INHALE 1 DOSE BY MOUTH TWICE DAILY   aspirin 81 mg chewable tablet   Yes No   Sig: Chew 81 mg daily   atorvastatin (LIPITOR) 40 mg tablet   Yes No   Sig: Take 40 mg by mouth daily   colestipol (COLESTID) 1 g tablet   Yes No   folic acid (FOLVITE) 1 mg tablet   Yes No   Sig: Take 1 mg by mouth daily   furosemide (LASIX) 40 mg tablet   Yes No   Sig: Take 40 mg by mouth daily   gabapentin (NEURONTIN) 800 mg tablet   Yes No   Sig: Take 800 mg by mouth 3 (three) times a day   lisinopril (ZESTRIL) 10 mg tablet   Yes No   Sig: Take 10 mg by mouth daily   metoprolol tartrate (LOPRESSOR) 25 mg tablet   Yes No   Sig: Take 25 mg by mouth every 12 (twelve) hours   montelukast (SINGULAIR) 10 mg tablet   Yes No   Sig: Take 10 mg by mouth   omeprazole (PriLOSEC) 20 mg delayed release capsule   Yes No   Sig: Take 20 mg by mouth daily   warfarin (COUMADIN) 5 mg tablet   Yes No      Facility-Administered Medications: None     Patient's Medications   Discharge Prescriptions    No medications on file     No discharge procedures on file.  ED SEPSIS DOCUMENTATION   Time reflects when diagnosis was documented in both MDM as applicable and the Disposition within this note       Time User Action Codes Description Comment    3/16/2025  2:21 PM Thomas Sandhu Add [R04.2] Hemoptysis     3/16/2025  2:21 PM Thomas Sandhu Add [I10] Hypertension     3/16/2025  2:21 PM Thomas Sandhu Add [R79.89] Elevated troponin     3/16/2025  3:49 PM Thomas Sandhu Add [J10.1] Influenza A     3/16/2025  3:49 PM Thomas Sandhu Add [J40] Bronchitis                  Thomas Sandhu DO  03/16/25 1602

## 2025-03-16 NOTE — ASSESSMENT & PLAN NOTE
Patient presents with shortness of breath worsening cough and hemoptysis  Since he diagnosed with acute influenza and was in the emergency room 3 days ago  Symptoms have been going on for over a week  CT chest shows.  No pulmonary artery emboli.  2.  Coarsened chronic interstitial lung markings predominantly in the lung bases, similar to the prior exam. No new consolidation.  3.  Prominent mediastinal and right hilar nodes may be reactive.  Likely postviral asthmatic bronchitis  Continue with scheduled DuoNebs, doxycycline, trial of steroids  In view of hemoptysis, further doses of warfarin will be placed on hold  Follow-up hemoglobin closely.  Pulmonology evaluation

## 2025-03-16 NOTE — H&P
H&P - Hospitalist   Name: Mary Carmen Woo 79 y.o. female I MRN: [id number]  Unit/Bed#: ED 06 I Date of Admission: 3/16/2025   Date of Service: 3/16/2025 I Hospital Day: 0     Assessment & Plan  Moderate persistent asthma with acute exacerbation  Patient presents with shortness of breath worsening cough and hemoptysis  Since he diagnosed with acute influenza and was in the emergency room 3 days ago  Symptoms have been going on for over a week  CT chest shows.  No pulmonary artery emboli.  2.  Coarsened chronic interstitial lung markings predominantly in the lung bases, similar to the prior exam. No new consolidation.  3.  Prominent mediastinal and right hilar nodes may be reactive.  Likely postviral asthmatic bronchitis  Continue with scheduled DuoNebs, doxycycline, trial of steroids  In view of hemoptysis, further doses of warfarin will be placed on hold  Follow-up hemoglobin closely.  Pulmonology evaluation  Hypertensive urgency  Blood pressure elevated at 232/103 on admission  Continue with outpatient dose of lisinopril, metoprolol, furosemide  Use as needed IV hydralazine for SBP greater than 180  Elevated troponin  EKG with chronic T wave inversions and right bundle branch block  Likely non MI troponin elevation in the setting of acute influenza and asthma exacerbation  Follow-up on serial troponin  Continue with telemetry monitoring  With beta-blocker and statin  Hold aspirin in the setting of hemoptysis  Follow-up on echocardiogram  Patient currently denies any chest pain  Chronic heart failure with preserved ejection fraction (HCC)  Wt Readings from Last 3 Encounters:   03/16/25 85.3 kg (188 lb)   06/22/24 95.3 kg (210 lb)   06/22/24 95.6 kg (210 lb 12.8 oz)         Echo from June 2023 with a normal EF of 58%, no wall motion abnormalities, grade 1 diastolic dysfunction, no significant valvular abnormality.  Denies any weight gain or lower extremity edema  Continue with current outpatient dose of  diuretics  Follow-up on repeat echocardiogram in view of elevated troponins    Chronic pulmonary embolism (HCC)  Maintained on warfarin.  Will be placed on hold in view of hemoptysis.  LYDIA (obstructive sleep apnea)  CPAP as tolerated      VTE Pharmacologic Prophylaxis: VTE Score: 4 High Risk (Score >/= 5) - Pharmacological DVT Prophylaxis Contraindicated. Sequential Compression Devices Ordered.  Code Status: No Order full code  Discussion with family: Updated  () at bedside.    Anticipated Length of Stay: Patient will be admitted on an inpatient basis with an anticipated length of stay of greater than 2 midnights secondary to management as documented above.    History of Present Illness   Chief Complaint: Shortness of breath    Mary Carmen Woo is a 79 y.o. female with a PMH of asthma, chronic heart failure with preserved ejection fraction, hypertension, LYDIA who presents with worsening shortness of breath, fatigue and tiredness.  Patient was diagnosed with influenza 3 days ago but was not prescribed Tamiflu.  Has been progressively feeling worse with more shortness of breath.  Denied any fever but complaining of chills.  Complained of cough with production of phlegm and blood-tinged sputum..  Denied any chest pain, lower extremity edema, weight gain, orthopnea or paroxysmal nocturnal dyspnea.  Has been feeling weak and has had poor appetite.    Review of Systems   Constitutional:  Positive for activity change, appetite change, chills and fatigue.   HENT:  Positive for congestion.    Respiratory:  Positive for chest tightness, shortness of breath and wheezing.    Cardiovascular: Negative.    Gastrointestinal: Negative.    Endocrine: Negative.    Genitourinary: Negative.    Musculoskeletal:  Positive for gait problem.   Allergic/Immunologic: Negative.    Hematological: Negative.    Psychiatric/Behavioral: Negative.         Historical Information   Past Medical History:   Diagnosis Date    GERD  (gastroesophageal reflux disease)     Heart failure (HCC)     High blood cholesterol     High blood pressure      Past Surgical History:   Procedure Laterality Date    APPENDECTOMY      BACK SURGERY      CATARACT EXTRACTION Bilateral     CHOLECYSTECTOMY      REPLACEMENT TOTAL KNEE Bilateral     TUBAL LIGATION       Social History     Tobacco Use    Smoking status: Never     Passive exposure: Never    Smokeless tobacco: Never   Vaping Use    Vaping status: Never Used   Substance and Sexual Activity    Alcohol use: Not Currently    Drug use: Never    Sexual activity: Not on file     E-Cigarette/Vaping    E-Cigarette Use Never User      E-Cigarette/Vaping Substances       Social History:  Marital Status: /Civil Union     Meds/Allergies   I have reviewed home medications with patient personally.  Prior to Admission medications    Medication Sig Start Date End Date Taking? Authorizing Provider   atorvastatin (LIPITOR) 40 mg tablet Take 40 mg by mouth daily at bedtime 7/31/23 3/16/25 Yes Historical Provider, MD   Fluticasone-Salmeterol (Advair) 250-50 mcg/dose inhaler INHALE 1 DOSE BY MOUTH TWICE DAILY 3/8/24  Yes Historical Provider, MD   folic acid (FOLVITE) 1 mg tablet Take 1 mg by mouth daily 9/28/23 3/16/25 Yes Historical Provider, MD   furosemide (LASIX) 40 mg tablet Take 40 mg by mouth daily 11/20/23  Yes Historical Provider, MD   gabapentin (NEURONTIN) 800 mg tablet Take 800 mg by mouth 3 (three) times a day 2/2/24  Yes Historical Provider, MD   lisinopril (ZESTRIL) 10 mg tablet Take 20 mg by mouth daily 11/20/23  Yes Historical Provider, MD   montelukast (SINGULAIR) 10 mg tablet Take 10 mg by mouth 3/11/24 3/16/25 Yes Historical Provider, MD   omeprazole (PriLOSEC) 20 mg delayed release capsule Take 20 mg by mouth daily 3/18/24  Yes Historical Provider, MD   warfarin (COUMADIN) 5 mg tablet Take 5 mg by mouth daily IN THE EVENING 3/21/24  Yes Historical Provider, MD   aspirin 81 mg chewable tablet Chew 81  mg daily    Historical Provider, MD   Aspirin-Calcium Carbonate  MG TABS Take 81 mg by mouth  Patient not taking: Reported on 3/16/2025    Historical Provider, MD   Cholecalciferol 25 MCG (1000 UT) capsule Take 1,000 Units by mouth daily    Historical Provider, MD   colestipol (COLESTID) 1 g tablet  3/18/24   Historical Provider, MD   metoprolol tartrate (LOPRESSOR) 25 mg tablet Take 25 mg by mouth every 12 (twelve) hours  Patient not taking: Reported on 3/16/2025 2/2/24   Historical Provider, MD     Allergies   Allergen Reactions    Codeine GI Intolerance and Other (See Comments)     GI upset    GI upset   GI upset    Sulfa Antibiotics Other (See Comments) and Rash     Flushing    Flushing   Other reaction(s): Other (See Comments)   Flushing       Objective :  Temp:  [99.4 °F (37.4 °C)] 99.4 °F (37.4 °C)  HR:  [70-87] 77  BP: (197-232)/() 197/84  Resp:  [18-34] 24  SpO2:  [95 %-99 %] 97 %  O2 Device: None (Room air)    Physical Exam  Constitutional:       Appearance: She is ill-appearing.   HENT:      Head: Normocephalic.      Mouth/Throat:      Mouth: Mucous membranes are dry.   Eyes:      Extraocular Movements: Extraocular movements intact.      Pupils: Pupils are equal, round, and reactive to light.   Cardiovascular:      Rate and Rhythm: Normal rate and regular rhythm.   Pulmonary:      Comments: Diminished breath sounds bilaterally with scattered expiratory wheezing.  Abdominal:      General: Bowel sounds are normal.      Palpations: Abdomen is soft.   Musculoskeletal:      Right lower leg: No edema.      Left lower leg: No edema.   Skin:     General: Skin is warm.   Neurological:      Mental Status: She is alert and oriented to person, place, and time. Mental status is at baseline.   Psychiatric:         Mood and Affect: Mood normal.          Lines/Drains:            Lab Results: I have reviewed the following results:  Results from last 7 days   Lab Units 03/16/25  1341   WBC Thousand/uL 7.62    HEMOGLOBIN g/dL 11.2*   HEMATOCRIT % 35.2   PLATELETS Thousands/uL 172   SEGS PCT % 75   LYMPHO PCT % 13*   MONO PCT % 12   EOS PCT % 0     Results from last 7 days   Lab Units 03/16/25  1341   SODIUM mmol/L 143   POTASSIUM mmol/L 3.7   CHLORIDE mmol/L 109*   CO2 mmol/L 23   BUN mg/dL 25   CREATININE mg/dL 0.96   ANION GAP mmol/L 11   CALCIUM mg/dL 9.1   ALBUMIN g/dL 4.0   TOTAL BILIRUBIN mg/dL 0.80   ALK PHOS U/L 54   ALT U/L 9   AST U/L 21   GLUCOSE RANDOM mg/dL 109     Results from last 7 days   Lab Units 03/16/25  1341   INR  2.61*         Lab Results   Component Value Date    HGBA1C 5.5 03/21/2024    HGBA1C 6.0 09/18/2023    HGBA1C 5.8 04/22/2023           Imaging Results Review: I reviewed radiology reports from this admission including: CT chest.  Other Study Results Review: EKG was reviewed.     Administrative Statements       ** Please Note: This note has been constructed using a voice recognition system. **

## 2025-03-16 NOTE — ASSESSMENT & PLAN NOTE
Wt Readings from Last 3 Encounters:   03/16/25 85.3 kg (188 lb)   06/22/24 95.3 kg (210 lb)   06/22/24 95.6 kg (210 lb 12.8 oz)         Echo from June 2023 with a normal EF of 58%, no wall motion abnormalities, grade 1 diastolic dysfunction, no significant valvular abnormality.  Denies any weight gain or lower extremity edema  Continue with current outpatient dose of diuretics  Follow-up on repeat echocardiogram in view of elevated troponins

## 2025-03-16 NOTE — ASSESSMENT & PLAN NOTE
Blood pressure elevated at 232/103 on admission  Continue with outpatient dose of lisinopril, metoprolol, furosemide  Use as needed IV hydralazine for SBP greater than 180

## 2025-03-16 NOTE — ASSESSMENT & PLAN NOTE
EKG with chronic T wave inversions and right bundle branch block  Likely non MI troponin elevation in the setting of acute influenza and asthma exacerbation  Follow-up on serial troponin  Continue with telemetry monitoring  With beta-blocker and statin  Hold aspirin in the setting of hemoptysis  Follow-up on echocardiogram  Patient currently denies any chest pain

## 2025-03-17 ENCOUNTER — APPOINTMENT (INPATIENT)
Dept: NON INVASIVE DIAGNOSTICS | Facility: HOSPITAL | Age: 80
DRG: 202 | End: 2025-03-17
Payer: COMMERCIAL

## 2025-03-17 LAB
ANION GAP SERPL CALCULATED.3IONS-SCNC: 9 MMOL/L (ref 4–13)
AORTIC ROOT: 2.7 CM
ASCENDING AORTA: 2.7 CM
ATRIAL RATE: 77 BPM
AV PEAK GRADIENT: 16 MMHG
BASOPHILS # BLD AUTO: 0 THOUSANDS/ÂΜL (ref 0–0.1)
BASOPHILS NFR BLD AUTO: 0 % (ref 0–1)
BSA FOR ECHO PROCEDURE: 1.91 M2
BUN SERPL-MCNC: 32 MG/DL (ref 5–25)
CALCIUM SERPL-MCNC: 9 MG/DL (ref 8.4–10.2)
CHLORIDE SERPL-SCNC: 110 MMOL/L (ref 96–108)
CO2 SERPL-SCNC: 21 MMOL/L (ref 21–32)
CREAT SERPL-MCNC: 1.06 MG/DL (ref 0.6–1.3)
E WAVE DECELERATION TIME: 301 MS
E/A RATIO: 0.91
EOSINOPHIL # BLD AUTO: 0 THOUSAND/ÂΜL (ref 0–0.61)
EOSINOPHIL NFR BLD AUTO: 0 % (ref 0–6)
ERYTHROCYTE [DISTWIDTH] IN BLOOD BY AUTOMATED COUNT: 13.9 % (ref 11.6–15.1)
FRACTIONAL SHORTENING: 34 (ref 28–44)
GFR SERPL CREATININE-BSD FRML MDRD: 50 ML/MIN/1.73SQ M
GLUCOSE SERPL-MCNC: 170 MG/DL (ref 65–140)
HCT VFR BLD AUTO: 33.6 % (ref 34.8–46.1)
HGB BLD-MCNC: 11 G/DL (ref 11.5–15.4)
IMM GRANULOCYTES # BLD AUTO: 0.03 THOUSAND/UL (ref 0–0.2)
IMM GRANULOCYTES NFR BLD AUTO: 1 % (ref 0–2)
INR PPP: 2.84 (ref 0.85–1.19)
INTERVENTRICULAR SEPTUM IN DIASTOLE (PARASTERNAL SHORT AXIS VIEW): 1.1 CM
INTERVENTRICULAR SEPTUM: 1.1 CM (ref 0.6–1.1)
LEFT ATRIUM SIZE: 4.4 CM
LEFT INTERNAL DIMENSION IN SYSTOLE: 3.3 CM (ref 2.1–4)
LEFT VENTRICLE DIASTOLIC VOLUME (MOD BIPLANE): 49 ML
LEFT VENTRICLE DIASTOLIC VOLUME INDEX (MOD BIPLANE): 25.7 ML/M2
LEFT VENTRICLE SYSTOLIC VOLUME (MOD BIPLANE): 16 ML
LEFT VENTRICLE SYSTOLIC VOLUME INDEX (MOD BIPLANE): 8.4 ML/M2
LEFT VENTRICULAR INTERNAL DIMENSION IN DIASTOLE: 5 CM (ref 3.5–6)
LEFT VENTRICULAR POSTERIOR WALL IN END DIASTOLE: 1.1 CM
LEFT VENTRICULAR STROKE VOLUME: 70 ML
LV EF BIPLANE MOD: 68 %
LV EF US.2D.A4C+ESTIMATED: 79 %
LVSV (TEICH): 70 ML
LYMPHOCYTES # BLD AUTO: 0.83 THOUSANDS/ÂΜL (ref 0.6–4.47)
LYMPHOCYTES NFR BLD AUTO: 18 % (ref 14–44)
MCH RBC QN AUTO: 33.4 PG (ref 26.8–34.3)
MCHC RBC AUTO-ENTMCNC: 32.7 G/DL (ref 31.4–37.4)
MCV RBC AUTO: 102 FL (ref 82–98)
MONOCYTES # BLD AUTO: 0.18 THOUSAND/ÂΜL (ref 0.17–1.22)
MONOCYTES NFR BLD AUTO: 4 % (ref 4–12)
MV E'TISSUE VEL-LAT: 5 CM/S
MV E'TISSUE VEL-SEP: 5 CM/S
MV PEAK A VEL: 0.9 M/S
MV PEAK E VEL: 82 CM/S
MV STENOSIS PRESSURE HALF TIME: 87 MS
MV VALVE AREA P 1/2 METHOD: 2.53
NEUTROPHILS # BLD AUTO: 3.58 THOUSANDS/ÂΜL (ref 1.85–7.62)
NEUTS SEG NFR BLD AUTO: 77 % (ref 43–75)
NRBC BLD AUTO-RTO: 0 /100 WBCS
P AXIS: 50 DEGREES
PLATELET # BLD AUTO: 202 THOUSANDS/UL (ref 149–390)
PMV BLD AUTO: 12.5 FL (ref 8.9–12.7)
POTASSIUM SERPL-SCNC: 3.8 MMOL/L (ref 3.5–5.3)
PR INTERVAL: 142 MS
PROCALCITONIN SERPL-MCNC: 0.09 NG/ML
PROTHROMBIN TIME: 29.8 SECONDS (ref 12.3–15)
QRS AXIS: -7 DEGREES
QRSD INTERVAL: 132 MS
QT INTERVAL: 418 MS
QTC INTERVAL: 473 MS
RA PRESSURE ESTIMATED: 3 MMHG
RBC # BLD AUTO: 3.29 MILLION/UL (ref 3.81–5.12)
SL CV LV EF: 68
SL CV PED ECHO LEFT VENTRICLE DIASTOLIC VOLUME (MOD BIPLANE) 2D: 116 ML
SL CV PED ECHO LEFT VENTRICLE SYSTOLIC VOLUME (MOD BIPLANE) 2D: 45 ML
SODIUM SERPL-SCNC: 140 MMOL/L (ref 135–147)
T WAVE AXIS: -13 DEGREES
VENTRICULAR RATE: 77 BPM
WBC # BLD AUTO: 4.62 THOUSAND/UL (ref 4.31–10.16)

## 2025-03-17 PROCEDURE — 94640 AIRWAY INHALATION TREATMENT: CPT

## 2025-03-17 PROCEDURE — 97161 PT EVAL LOW COMPLEX 20 MIN: CPT

## 2025-03-17 PROCEDURE — 84145 PROCALCITONIN (PCT): CPT | Performed by: INTERNAL MEDICINE

## 2025-03-17 PROCEDURE — 85025 COMPLETE CBC W/AUTO DIFF WBC: CPT | Performed by: INTERNAL MEDICINE

## 2025-03-17 PROCEDURE — 97166 OT EVAL MOD COMPLEX 45 MIN: CPT

## 2025-03-17 PROCEDURE — 85610 PROTHROMBIN TIME: CPT | Performed by: INTERNAL MEDICINE

## 2025-03-17 PROCEDURE — 99232 SBSQ HOSP IP/OBS MODERATE 35: CPT

## 2025-03-17 PROCEDURE — 94760 N-INVAS EAR/PLS OXIMETRY 1: CPT

## 2025-03-17 PROCEDURE — 80048 BASIC METABOLIC PNL TOTAL CA: CPT | Performed by: INTERNAL MEDICINE

## 2025-03-17 PROCEDURE — C8929 TTE W OR WO FOL WCON,DOPPLER: HCPCS

## 2025-03-17 RX ORDER — METHYLPREDNISOLONE SODIUM SUCCINATE 40 MG/ML
40 INJECTION, POWDER, LYOPHILIZED, FOR SOLUTION INTRAMUSCULAR; INTRAVENOUS EVERY 8 HOURS SCHEDULED
Status: DISCONTINUED | OUTPATIENT
Start: 2025-03-17 | End: 2025-03-17

## 2025-03-17 RX ORDER — METHYLPREDNISOLONE SODIUM SUCCINATE 40 MG/ML
40 INJECTION, POWDER, LYOPHILIZED, FOR SOLUTION INTRAMUSCULAR; INTRAVENOUS EVERY 12 HOURS SCHEDULED
Status: DISCONTINUED | OUTPATIENT
Start: 2025-03-17 | End: 2025-03-19 | Stop reason: HOSPADM

## 2025-03-17 RX ADMIN — DOXYCYCLINE 100 MG: 100 CAPSULE ORAL at 08:20

## 2025-03-17 RX ADMIN — ATORVASTATIN CALCIUM 40 MG: 40 TABLET, FILM COATED ORAL at 21:46

## 2025-03-17 RX ADMIN — MONTELUKAST 10 MG: 10 TABLET, FILM COATED ORAL at 21:46

## 2025-03-17 RX ADMIN — GUAIFENESIN 600 MG: 600 TABLET, EXTENDED RELEASE ORAL at 08:20

## 2025-03-17 RX ADMIN — METHYLPREDNISOLONE SODIUM SUCCINATE 40 MG: 40 INJECTION, POWDER, FOR SOLUTION INTRAMUSCULAR; INTRAVENOUS at 21:44

## 2025-03-17 RX ADMIN — ACETAMINOPHEN 650 MG: 325 TABLET ORAL at 06:04

## 2025-03-17 RX ADMIN — GABAPENTIN 800 MG: 400 CAPSULE ORAL at 21:46

## 2025-03-17 RX ADMIN — METHYLPREDNISOLONE SODIUM SUCCINATE 40 MG: 40 INJECTION, POWDER, FOR SOLUTION INTRAMUSCULAR; INTRAVENOUS at 05:59

## 2025-03-17 RX ADMIN — IPRATROPIUM BROMIDE AND ALBUTEROL SULFATE 3 ML: 2.5; .5 SOLUTION RESPIRATORY (INHALATION) at 19:30

## 2025-03-17 RX ADMIN — CARVEDILOL 12.5 MG: 12.5 TABLET, FILM COATED ORAL at 17:23

## 2025-03-17 RX ADMIN — PERFLUTREN 0.6 ML/MIN: 6.52 INJECTION, SUSPENSION INTRAVENOUS at 10:40

## 2025-03-17 RX ADMIN — GABAPENTIN 800 MG: 400 CAPSULE ORAL at 17:23

## 2025-03-17 RX ADMIN — CARVEDILOL 12.5 MG: 12.5 TABLET, FILM COATED ORAL at 08:20

## 2025-03-17 RX ADMIN — GUAIFENESIN 600 MG: 600 TABLET, EXTENDED RELEASE ORAL at 21:46

## 2025-03-17 RX ADMIN — IPRATROPIUM BROMIDE AND ALBUTEROL SULFATE 3 ML: 2.5; .5 SOLUTION RESPIRATORY (INHALATION) at 07:52

## 2025-03-17 RX ADMIN — GABAPENTIN 800 MG: 400 CAPSULE ORAL at 08:20

## 2025-03-17 RX ADMIN — PANTOPRAZOLE SODIUM 40 MG: 40 TABLET, DELAYED RELEASE ORAL at 05:59

## 2025-03-17 RX ADMIN — IPRATROPIUM BROMIDE AND ALBUTEROL SULFATE 3 ML: 2.5; .5 SOLUTION RESPIRATORY (INHALATION) at 14:28

## 2025-03-17 RX ADMIN — DOXYCYCLINE 100 MG: 100 CAPSULE ORAL at 21:46

## 2025-03-17 RX ADMIN — LISINOPRIL 20 MG: 20 TABLET ORAL at 08:20

## 2025-03-17 RX ADMIN — FLUTICASONE FUROATE AND VILANTEROL TRIFENATATE 1 PUFF: 200; 25 POWDER RESPIRATORY (INHALATION) at 17:23

## 2025-03-17 RX ADMIN — FOLIC ACID 1 MG: 1 TABLET ORAL at 08:20

## 2025-03-17 RX ADMIN — Medication 1000 UNITS: at 08:20

## 2025-03-17 NOTE — UTILIZATION REVIEW
Initial Clinical Review    Admission: Date/Time/Statement:   Admission Orders (From admission, onward)       Ordered        03/16/25 1601  INPATIENT ADMISSION  Once                          Orders Placed This Encounter   Procedures    INPATIENT ADMISSION     Standing Status:   Standing     Number of Occurrences:   1     Level of Care:   Med Surg [16]     Estimated length of stay:   More than 2 Midnights     Certification:   I certify that inpatient services are medically necessary for this patient for a duration of greater than two midnights. See H&P and MD Progress Notes for additional information about the patient's course of treatment.     ED Arrival Information       Expected   -    Arrival   3/16/2025 12:44    Acuity   Urgent              Means of arrival   Wheelchair    Escorted by   Spouse    Service   Hospitalist    Admission type   Emergency              Arrival complaint   spitting up blood, cough             Chief Complaint   Patient presents with    Cough     Pt presented to this ED from home c/o cough over past week noticed blood in sputum past 2 days. Pt seen in other ED last week per cough. Pt taking warfarin.       Initial Presentation: 79 y.o. female to ED via WC from home  Present to ED with worsening shortness of breath, fatigue and tiredness. Patient was diagnosed with influenza 3 days ago but was not prescribed Tamiflu. Has been progressively feeling worse with more shortness of breath. Endorses ough with production of phlegm, blood-tinged sputum, feeling weak and has had poor appetite.   PMHX asthma, chronic heart failure with preserved ejection fraction, hypertension, LYDIA   Admitted to [facility] with DX: Moderate persistent asthma with acute exacerbation   on exam: T 99.4; hypertensive; tachypnea; lungs decreased with wheezing  EKG with chronic T wave inversions and right bundle branch block   PLAN: cont DuoNebs; start solumedrol iv; monitor labs; tele monitoring; continuous pulse ox; hold coumadin;  trend / monitor BP's; trend TROPs; f/u echo      Anticipated Length of Stay/Certification Statement: Patient will be admitted on an inpatient basis with an anticipated length of stay of greater than 2 midnights secondary to management as documented above.       Date: 3/17/25      Day 2   Overnight declined CPAP d/t nasal congestion - agreed to O2. Feeling improved. Had some dark red sputum this AM. Still coughing but not as much. Less short of breath. C/o headache. Hypertensive; lungs with wheezing. Metoprolol has been changed to coreg for better BP control; TROP trending down.   Plan: cont DuoNebs; cont solumedrol iv; monitor labs; tele monitoring; continuous pulse ox; hold coumadin; trend / monitor BP's; BP med changes; trend TROPs; f/u echo      Date: 3/18/25   Day 3: Has surpassed a 2nd midnight with active treatments and services. Require additional inpatient hospital stay due to KALLI   Will resume coumadin as has been without hemoptysis x 24 hours.  Patient developed KALLI on 3/18 with creatinine 1.38 from 1.06.    echocardiogram -LV with mild concentric hypertrophy, LVEF 68% with normal systolic function, no diagnostic regional wall motion abnormality identified.  Diastolic function is mildly abnormal consistent with grade 1 relaxation and left atrial filling pressures elevated   Plan: cont DuoNebs; cont solumedrol iv; start ivf; monitor labs; tele monitoring; continuous pulse ox; hold coumadin; trend / monitor BP's; BP med changes; trend TROPs; f/u echo; f/u urine stuties; hold lasix / lisinopril      ED Treatment-Medication Administration from 03/16/2025 1241 to 03/16/2025 1729         Date/Time Order Dose Route Action     03/16/2025 1343 dexamethasone (PF) (DECADRON) injection 8 mg 8 mg Intravenous Given     03/16/2025 1343 albuterol inhalation solution 2.5 mg 2.5 mg Nebulization Given     03/16/2025 1435 magnesium sulfate IVPB (premix) SOLN 1 g 1 g Intravenous New Bag     03/16/2025 1437 hydrALAZINE  (APRESOLINE) injection 10 mg 10 mg Intravenous Given     03/16/2025 1429 iohexol (OMNIPAQUE) 350 MG/ML injection (MULTI-DOSE) 72 mL 72 mL Intravenous Given     03/16/2025 1526 hydrALAZINE (APRESOLINE) injection 10 mg 10 mg Intravenous Given     03/16/2025 1526 labetalol (NORMODYNE) injection 10 mg 10 mg Intravenous Given            Scheduled Medications:  atorvastatin, 40 mg, Oral, HS  carvedilol, 12.5 mg, Oral, BID With Meals  cholecalciferol, 1,000 Units, Oral, Daily  doxycycline hyclate, 100 mg, Oral, Q12H CULLEN  fluticasone-vilanterol, 1 puff, Inhalation, Daily  folic acid, 1 mg, Oral, Daily  gabapentin, 800 mg, Oral, TID  guaiFENesin, 600 mg, Oral, Q12H CULLEN  ipratropium-albuterol, 3 mL, Nebulization, Q6H While awake  lisinopril, 20 mg, Oral, Daily  methylPREDNISolone sodium succinate, 40 mg, Intravenous, Q8H CULLEN  montelukast, 10 mg, Oral, HS  pantoprazole, 40 mg, Oral, Early Morning      Continuous IV Infusions: sodium chloride, 100 mL/hr, Intravenous, Continuous  (started 3/18)       PRN Meds:  acetaminophen, 650 mg, Oral, Q6H PRN  hydrALAZINE, 5 mg, Intravenous, Q6H PRN  ondansetron, 4 mg, Intravenous, Q6H PRN      ED Triage Vitals   Temperature Pulse Respirations Blood Pressure SpO2 Pain Score   03/16/25 1301 03/16/25 1301 03/16/25 1301 03/16/25 1301 03/16/25 1301 03/16/25 1735   99.4 °F (37.4 °C) 79 18 (!) 217/120 96 % 5     Weight (last 2 days)       Date/Time Weight    03/17/25 1045 85.3 (188)    03/16/25 18:40:52 85.5 (188.6)    03/16/25 1301 85.3 (188)            Vital Signs (last 3 days)       Date/Time Temp Pulse Resp BP MAP (mmHg) SpO2 O2 Device Patient Position - Orthostatic VS Hans Coma Scale Score Pain    03/18/25 0847 -- -- -- -- -- 96 % None (Room air) -- 15 No Pain    03/18/25 07:19:12 97.3 °F (36.3 °C) 75 18 135/68 90 90 % -- Lying -- --    03/17/25 21:50:41 -- 69 -- 145/73 97 91 % -- -- -- --    03/17/25 1937 -- -- -- -- -- -- None (Room air) -- 15 No Pain    03/17/25 1931 -- -- -- -- -- 95  % -- -- -- --    03/17/25 1449 -- -- -- -- -- -- -- -- -- No Pain    03/17/25 14:47:29 -- 74 -- 151/75 100 94 % -- -- -- --    03/17/25 1432 -- -- -- -- -- -- -- -- -- No Pain    03/17/25 1428 -- -- -- -- -- 96 % None (Room air) -- -- --    03/17/25 1045 -- 71 -- 149/72 -- 95 % -- -- -- --    03/17/25 09:22:37 -- 82 -- 149/72 98 97 % -- -- -- --    03/17/25 0823 -- -- -- -- -- 95 % None (Room air) -- 15 2    03/17/25 0753 -- -- -- -- -- 96 % None (Room air) -- -- --    03/17/25 07:34:03 97.2 °F (36.2 °C) 73 18 177/85 116 92 % None (Room air) Lying -- --    03/17/25 0604 -- -- -- -- -- -- -- -- -- 3    03/16/25 2259 -- -- -- -- -- -- -- -- -- 3    03/16/25 22:37:39 98.2 °F (36.8 °C) 87 -- 161/81 108 97 % -- -- -- --    03/16/25 1945 -- -- -- -- -- -- None (Room air) -- 15 No Pain    03/16/25 1932 -- -- -- -- -- 99 % -- -- -- --    03/16/25 1919 -- -- -- -- -- 99 % None (Room air) -- -- --    03/16/25 1918 -- -- -- -- -- 96 % -- -- -- --    03/16/25 18:40:52 -- 82 -- 173/84 114 96 % -- -- -- --    03/16/25 1741 98.2 °F (36.8 °C) 77 20 190/85 -- -- -- Lying -- --    03/16/25 1735 -- -- -- -- -- 96 % None (Room air) -- 15 5    03/16/25 1715 -- 79 19 193/81 117 95 % -- -- -- --    03/16/25 1700 -- 77 28 181/83 119 94 % -- -- -- --    03/16/25 1600 -- 77 24 197/84 121 97 % -- -- -- --    03/16/25 1530 -- 74 29 198/88 126 97 % -- -- -- --    03/16/25 1526 -- -- -- 221/95 -- -- -- -- -- --    03/16/25 1515 -- 78 22 221/95 136 97 % -- -- -- --    03/16/25 1500 -- 79 22 222/86 136 99 % None (Room air) -- -- --    03/16/25 1445 -- 78 34 212/95 136 98 % -- -- -- --    03/16/25 1436 -- 70 26 232/103 -- 98 % None (Room air) -- -- --    03/16/25 1326 -- 87 20 219/97 -- 95 % None (Room air) -- -- --    03/16/25 1325 -- -- -- -- -- -- None (Room air) -- -- --    03/16/25 1324 -- -- -- -- -- -- -- -- 15 --    03/16/25 1301 99.4 °F (37.4 °C) 79 18 217/120 -- 96 % None (Room air) -- -- --              Pertinent Labs/Diagnostic Test  Results:   Radiology:  CTA chest pe study   Final Interpretation by Brayan Wynn MD (03/16 3810)      1.  No pulmonary artery emboli.   2.  Coarsened chronic interstitial lung markings predominantly in the lung bases, similar to the prior exam. No new consolidation.   3.  Prominent mediastinal and right hilar nodes may be reactive. Consider 3-month chest CT follow-up to ensure stability/resolution.                  Workstation performed: [id number]           Cardiology:  Echo complete w/ contrast if indicated   Final Result by Mark Georges Jr., MD (03/17 6936)        Left Ventricle: Left ventricular cavity size is normal. There is mild    concentric hypertrophy. The left ventricular ejection fraction is 68% by    biplane measurement. Systolic function is normal. Although no diagnostic    regional wall motion abnormality was identified, this possibility cannot    be completely excluded on the basis of this study. Diastolic function is    mildly abnormal, consistent with grade I (abnormal) relaxation.  Left    atrial filling pressure is elevated.     Left Atrium: The atrium is mildly dilated.     Mitral Valve: There is mild annular calcification. There is mild    regurgitation with a posteriorly directed jet.         ECG 12 lead   Final Result by Mark Georges Jr., MD (03/17 0814)   Sinus rhythm with Premature atrial complexes   Right bundle branch block   Abnormal ECG   When compared with ECG of 22-Jun-2024 15:49,   Nonspecific T wave abnormality has replaced inverted T waves in Anterior    leads   Confirmed by Mark Georges ([id number]) on 3/17/2025 8:14:26 AM           Results from last 7 days   Lab Units 03/18/25  0453 03/17/25  0556 03/16/25  1341   WBC Thousand/uL 11.64* 4.62 7.62   HEMOGLOBIN g/dL 10.2* 11.0* 11.2*   HEMATOCRIT % 30.7* 33.6* 35.2   PLATELETS Thousands/uL 186 202 172   TOTAL NEUT ABS Thousands/µL  --  3.58 5.63        Results from last 7 days   Lab Units 03/18/25  0453 03/17/25  0556  03/16/25  1341 03/14/25  0145   SODIUM mmol/L 138 140 143 143   POTASSIUM mmol/L 4.0 3.8 3.7 4.1   CHLORIDE mmol/L 108 110* 109*  --    CO2 mmol/L 22 21 23 25   ANION GAP mmol/L 8 9 11 8   BUN mg/dL 54* 32* 25 22   CREATININE mg/dL 1.38* 1.06 0.96 0.94   EGFR ml/min/1.73sq m 36 50 57 62.3  57.74   CALCIUM mg/dL 8.7 9.0 9.1 9.4   MAGNESIUM mg/dL 1.9  --  1.6*  --      Results from last 7 days   Lab Units 03/16/25  1341   AST U/L 21   ALT U/L 9   ALK PHOS U/L 54   TOTAL PROTEIN g/dL 6.7   ALBUMIN g/dL 4.0   TOTAL BILIRUBIN mg/dL 0.80        Results from last 7 days   Lab Units 03/18/25  0453 03/17/25  0556 03/16/25  1341 03/14/25  0145   GLUCOSE RANDOM mg/dL 198* 170* 109 114*        Results from last 7 days   Lab Units 03/16/25  1341   PH MARYLIN  7.406*   PCO2 MARYLIN mm Hg 35.1*   PO2 MARY[place] mm Hg 36.6   HCO3 M[place] mmol/L 21.5*   BASE EXC M[place] mmol/L -2.6   O2 CONTENT MARYLIN ml/dL 11.9   O2 HGB, VENOUS % 68.7        Results from last 7 days   Lab Units 03/16/25  1757 03/16/25  1613 03/16/25  1341   HS TNI 0HR ng/L  --   --  312*   HS TNI 2HR ng/L  --  277*  --    HSTNI D2 ng/L  --  -35  --    HS TNI 4HR ng/L 263*  --   --    HSTNI D4 ng/L -49  --   --          Results from last 7 days   Lab Units 03/17/25  0556 03/16/25  1341   PROTIME seconds 29.8* 28.0*   INR  2.84* 2.61*   PTT seconds  --  29        Results from last 7 days   Lab Units 03/17/25  0556   PROCALCITONIN ng/ml 0.09        Results from last 7 days   Lab Units 03/16/25  1341   BNP pg/mL 487*           Results from last 7 days   Lab Units 03/16/25  1350 03/16/25  1341   BLOOD CULTURE  No Growth at 24 hrs. No Growth at 24 hrs.          Past Medical History:   Diagnosis Date    GERD (gastroesophageal reflux disease)     Heart failure (HCC)     High blood cholesterol     High blood pressure           Admitting Diagnosis: Cough [R05.9]  Bronchitis [J40]  Hypertension [I10]  Influenza A [J10.1]  Elevated troponin [R79.89]  Hemoptysis [R04.2]  Age/Sex: 79 y.o.  female    Network Utilization Review Department  ATTENTION: Please call with any questions or concerns to [phone] and carefully listen to the prompts so that you are directed to the right person. All voicemails are confidential.   For Discharge needs, contact Care Management DC Support Team at [phone] opt. 2  Send all requests for admission clinical reviews, approved or denied determinations and any other requests to dedicated fax number below belonging to the campus where the patient is receiving treatment. List of dedicated fax numbers for the Facilities:  FACILITY NAME UR FAX NUMBER   ADMISSION DENIALS (Administrative/Medical Necessity) [phone]   DISCHARGE SUPPORT TEAM (NETWORK) [phone]   PARENT CHILD HEALTH (Maternity/NICU/Pediatrics) [phone]   [facility] [phone]   [facility] [phone]   [facility] [phone]   [facility] [phone]   [facility] [phone]   [facility] [phone]   [facility] [phone]   [facility] [phone]   [facility] [phone]   [facility] [phone]   [facility] [phone]   [facility] [phone]

## 2025-03-17 NOTE — PROGRESS NOTES
Progress Note - Hospitalist   Name: Mary Carmen Woo 79 y.o. female I MRN: [id number]  Unit/Bed#: -01 I Date of Admission: 3/16/2025   Date of Service: 3/17/2025 I Hospital Day: 1    Assessment & Plan  Moderate persistent asthma with acute exacerbation  Presents to ED with SOB and cough with hemoptysis   Recently diagnosed three days prior to admission in ED with influenza - ongoing symptoms x 1 week   CT chest:   Suspect post-viral bronchitis   Trial of steroids - wheezing is improved, continue q 8 hours today with likely wean tomorrow   Doxycycline  Duo-nebs  Coumadin on hold due to hemoptysis - Hgb stable   Appreciate pulmonology input   Hypertensive urgency  Blood pressure elevated at 232/103 on admission  PTA lisinopril, metoprolol, furosemide  Metoprolol has been changed to coreg for better BP control   Use as needed IV hydralazine for SBP greater than 180  Elevated troponin  EKG with chronic T wave inversions and right bundle branch block  Likely non MI troponin elevation in the setting of acute influenza and asthma exacerbation  Follow-up on serial troponin - 312-> 277 -> 263  Trending down   Continue beta-blocker and statin  Hold aspirin in the setting of hemoptysis  Follow-up on echocardiogram  Patient currently denies any chest pain  Chronic heart failure with preserved ejection fraction (HCC)  Wt Readings from Last 3 Encounters:   03/16/25 85.5 kg (188 lb 9.6 oz)   06/22/24 95.3 kg (210 lb)   06/22/24 95.6 kg (210 lb 12.8 oz)     Echo from June 2023 with a normal EF of 58%, no wall motion abnormalities, grade 1 diastolic dysfunction, no significant valvular abnormality.  Denies any weight gain or lower extremity edema  Continue with current outpatient dose of diuretics  Follow-up on repeat echocardiogram in view of elevated troponins    Chronic pulmonary embolism (HCC)  Maintained on warfarin.  Will be placed on hold in view of hemoptysis.  LYDIA (obstructive sleep apnea)  CPAP as tolerated - currently  declining due to nasal congestion, agreeable to oxygen overnight     VTE Pharmacologic Prophylaxis: VTE Score: 4 Moderate Risk (Score 3-4) - Pharmacological DVT Prophylaxis Contraindicated. Sequential Compression Devices Ordered.    Mobility:   Basic Mobility Inpatient Raw Score: 21  JH-HLM Goal: 6: Walk 10 steps or more  JH-HLM Achieved: 7: Walk 25 feet or more  JH-HLM Goal achieved. Continue to encourage appropriate mobility.    Patient Centered Rounds: I performed bedside rounds with nursing staff today.   Discussions with Specialists or Other Care Team Provider: OSIRIS    Education and Discussions with Family / Patient: Updated  () via phone.    Current Length of Stay: 1 day(s)  Current Patient Status: Inpatient   Certification Statement: The patient will continue to require additional inpatient hospital stay due to pending improvement in asthma exacerbation   Discharge Plan: Anticipate discharge tomorrow to home.    Code Status: Level 1 - Full Code    Subjective   Seen and examined. Feeling improved. Had some dark red sputum this AM. Still coughing but not as much. Less short of breath. No chest pain. Mild headache this AM     Objective :  Temp:  [98.2 °F (36.8 °C)-99.4 °F (37.4 °C)] 98.2 °F (36.8 °C)  HR:  [70-87] 73  BP: (161-232)/() 177/85  Resp:  [18-34] 18  SpO2:  [92 %-99 %] 92 %  O2 Device: None (Room air)    Body mass index is 32.37 kg/m².     Input and Output Summary (last 24 hours):     Intake/Output Summary (Last 24 hours) at 3/17/2025 0739  Last data filed at 3/16/2025 1535  Gross per 24 hour   Intake 100 ml   Output --   Net 100 ml       Physical Exam  Vitals and nursing note reviewed.   Constitutional:       General: She is not in acute distress.     Appearance: Normal appearance. She is not ill-appearing.   HENT:      Head: Normocephalic and atraumatic.      Nose: No congestion.      Mouth/Throat:      Mouth: Mucous membranes are moist.      Pharynx: Oropharynx is clear.    Eyes:      Conjunctiva/sclera: Conjunctivae normal.   Cardiovascular:      Rate and Rhythm: Normal rate and regular rhythm.      Pulses: Normal pulses.      Heart sounds: Normal heart sounds. No murmur heard.  Pulmonary:      Effort: Pulmonary effort is normal. No respiratory distress.      Breath sounds: Wheezing (faint) present.   Abdominal:      General: Bowel sounds are normal.      Palpations: Abdomen is soft.      Tenderness: There is no abdominal tenderness.   Musculoskeletal:         General: Normal range of motion.      Cervical back: Normal range of motion.      Right lower leg: No edema.      Left lower leg: No edema.   Skin:     General: Skin is warm and dry.   Neurological:      Mental Status: She is alert and oriented to person, place, and time.   Psychiatric:         Mood and Affect: Mood normal.         Behavior: Behavior normal.           Lines/Drains:              Lab Results: I have reviewed the following results:   Results from last 7 days   Lab Units 03/17/25  0556   WBC Thousand/uL 4.62   HEMOGLOBIN g/dL 11.0*   HEMATOCRIT % 33.6*   PLATELETS Thousands/uL 202   SEGS PCT % 77*   LYMPHO PCT % 18   MONO PCT % 4   EOS PCT % 0     Results from last 7 days   Lab Units 03/17/25  0556 03/16/25  1341   SODIUM mmol/L 140 143   POTASSIUM mmol/L 3.8 3.7   CHLORIDE mmol/L 110* 109*   CO2 mmol/L 21 23   BUN mg/dL 32* 25   CREATININE mg/dL 1.06 0.96   ANION GAP mmol/L 9 11   CALCIUM mg/dL 9.0 9.1   ALBUMIN g/dL  --  4.0   TOTAL BILIRUBIN mg/dL  --  0.80   ALK PHOS U/L  --  54   ALT U/L  --  9   AST U/L  --  21   GLUCOSE RANDOM mg/dL 170* 109     Results from last 7 days   Lab Units 03/17/25  0556   INR  2.84*             Results from last 7 days   Lab Units 03/17/25  0556   PROCALCITONIN ng/ml 0.09       Recent Cultures (last 7 days):   Results from last 7 days   Lab Units 03/16/25  1350 03/16/25  1341   BLOOD CULTURE  Received in Microbiology Lab. Culture in Progress. Received in Microbiology Lab. Culture  in Progress.             Last 24 Hours Medication List:     Current Facility-Administered Medications:     acetaminophen (TYLENOL) tablet 650 mg, Q6H PRN    atorvastatin (LIPITOR) tablet 40 mg, HS    carvedilol (COREG) tablet 12.5 mg, BID With Meals    Cholecalciferol (VITAMIN D3) tablet 1,000 Units, Daily    doxycycline hyclate (VIBRAMYCIN) capsule 100 mg, Q12H CULLEN    fluticasone-vilanterol 200-25 mcg/actuation 1 puff, Daily    folic acid (FOLVITE) tablet 1 mg, Daily    gabapentin (NEURONTIN) capsule 800 mg, TID    guaiFENesin (MUCINEX) 12 hr tablet 600 mg, Q12H CULLEN    hydrALAZINE (APRESOLINE) injection 5 mg, Q6H PRN    ipratropium-albuterol (DUO-NEB) 0.5-2.5 mg/3 mL inhalation solution 3 mL, Q6H While awake    lisinopril (ZESTRIL) tablet 20 mg, Daily    methylPREDNISolone sodium succinate (Solu-MEDROL) injection 40 mg, Q8H CULLEN    montelukast (SINGULAIR) tablet 10 mg, HS    ondansetron (ZOFRAN) injection 4 mg, Q6H PRN    pantoprazole (PROTONIX) EC tablet 40 mg, Early Morning    Administrative Statements   Today, Patient Was Seen By: Amaya Colbert PA-C      **Please Note: This note may have been constructed using a voice recognition system.**

## 2025-03-17 NOTE — ASSESSMENT & PLAN NOTE
Blood pressure elevated at 232/103 on admission  PTA lisinopril, metoprolol, furosemide  Metoprolol has been changed to coreg for better BP control   Use as needed IV hydralazine for SBP greater than 180

## 2025-03-17 NOTE — PLAN OF CARE
Problem: Potential for Falls  Goal: Patient will remain free of falls  Description: INTERVENTIONS:  - Educate patient/family on patient safety including physical limitations  - Instruct patient to call for assistance with activity   - Consult OT/PT to assist with strengthening/mobility   - Keep Call bell within reach  - Keep bed low and locked with side rails adjusted as appropriate  - Keep care items and personal belongings within reach  - Initiate and maintain comfort rounds  - Make Fall Risk Sign visible to staff    - Apply yellow socks and bracelet for high fall risk patients  - Consider moving patient to room near nurses station  Outcome: Progressing     Problem: PAIN - ADULT  Goal: Verbalizes/displays adequate comfort level or baseline comfort level  Description: Interventions:  - Encourage patient to monitor pain and request assistance  - Assess pain using appropriate pain scale  - Administer analgesics based on type and severity of pain and evaluate response  - Implement non-pharmacological measures as appropriate and evaluate response  - Consider cultural and social influences on pain and pain management  - Notify physician/advanced practitioner if interventions unsuccessful or patient reports new pain  Outcome: Progressing     Problem: INFECTION - ADULT  Goal: Absence or prevention of progression during hospitalization  Description: INTERVENTIONS:  - Assess and monitor for signs and symptoms of infection  - Monitor lab/diagnostic results  - Monitor all insertion sites, i.e. indwelling lines, tubes, and drains  - Monitor endotracheal if appropriate and nasal secretions for changes in amount and color  - [place] appropriate cooling/warming therapies per order  - Administer medications as ordered  - Instruct and encourage patient and family to use good hand hygiene technique  - Identify and instruct in appropriate isolation precautions for identified infection/condition  Outcome: Progressing     Problem:  SAFETY ADULT  Goal: Patient will remain free of falls  Description: INTERVENTIONS:  - Educate patient/family on patient safety including physical limitations  - Instruct patient to call for assistance with activity   - Consult OT/PT to assist with strengthening/mobility   - Keep Call bell within reach  - Keep bed low and locked with side rails adjusted as appropriate  - Keep care items and personal belongings within reach  - Initiate and maintain comfort rounds  - Make Fall Risk Sign visible to staff    - Apply yellow socks and bracelet for high fall risk patients  - Consider moving patient to room near nurses station  Outcome: Progressing     Problem: RESPIRATORY - ADULT  Goal: Achieves optimal ventilation and oxygenation  Description: INTERVENTIONS:  - Assess for changes in respiratory status  - Assess for changes in mentation and behavior  - Position to facilitate oxygenation and minimize respiratory effort  - Oxygen administered by appropriate delivery if ordered  - Initiate smoking cessation education as indicated  - Encourage broncho-pulmonary hygiene including cough, deep breathe, Incentive Spirometry  - Assess the need for suctioning and aspirate as needed  - Assess and instruct to report SOB or any respiratory difficulty  - Respiratory Therapy support as indicated  Outcome: Progressing

## 2025-03-17 NOTE — ASSESSMENT & PLAN NOTE
Presents to ED with SOB and cough with hemoptysis   Recently diagnosed three days prior to admission in ED with influenza - ongoing symptoms x 1 week   CT chest:   Suspect post-viral bronchitis   Trial of steroids - wheezing is improved, continue q 8 hours today with likely wean tomorrow   Doxycycline  Duo-nebs  Coumadin on hold due to hemoptysis - Hgb stable   Appreciate pulmonology input

## 2025-03-17 NOTE — ASSESSMENT & PLAN NOTE
EKG with chronic T wave inversions and right bundle branch block  Likely non MI troponin elevation in the setting of acute influenza and asthma exacerbation  Follow-up on serial troponin - 312-> 277 -> 263  Trending down   Continue beta-blocker and statin  Hold aspirin in the setting of hemoptysis  Follow-up on echocardiogram  Patient currently denies any chest pain

## 2025-03-17 NOTE — PLAN OF CARE
Problem: OCCUPATIONAL THERAPY ADULT  Goal: Performs self-care activities at highest level of function for planned discharge setting.  See evaluation for individualized goals.  Description: Treatment Interventions: Functional transfer training, UE strengthening/ROM, Endurance training, Patient/family training, Fine motor coordination activities, Compensatory technique education, Energy conservation, Activityengagement, Cardiac education          See flowsheet documentation for full assessment, interventions and recommendations.   Note: Limitation: Decreased UE strength, Decreased endurance, Decreased self-care trans, Decreased high-level ADLs, Decreased fine motor control  Prognosis: Good  Assessment: Pt is a 79 y.o. female, admitted to [facility] 3/16/2025 d/t experiencing shortness of breath, fatigue, and productive cough. Dx: moderate persistent asthma with acute exacerbation and influenze A. Pt with PMHx impacting their performance during ADL tasks, including: asthma, CHF, HTN, LYDIA, B TKA, GERD. Prior to admission to the hospital Pt was performing ADLs without physical assistance. IADLs without physical assistance. Functional transfers/ambulation without physical assistance. Cognitive status was PTA was WFL. OT order placed to assess Pt's ADLs, cognitive status, and performance during functional tasks in order to maximize safety and independence while making most appropriate d/c recommendations. Pt's clinical presentation is currently evolving given new onset deficits that effect Pt's occupational performance and ability to safely return to OF including decrease activity tolerance, decrease standing balance, decrease generalized strength, decrease activity engagement, decrease performance during functional transfers, decrease FM control, and steps to enter home combined with medical complications of hypertension , abnormal H&H, abnormal CBC, abnormal CO2 values, and need for input for mobility technique/safety.  Personal factors affecting Pt at time of initial evaluation include: step(s) to enter environment, multi-level environment, inability to perform current job functions, inability to perform IADLs, inability to perform ADLs, inability to ambulate household distances, inability to navigate community distances, and decreased initiation and engagement. Pt will benefit from continued skilled OT services to address deficits as defined above and to maximize level independence/participation during ADLs and functional tasks to facilitate return toward PLOF and improved quality of life. From an occupational therapy standpoint, recommendation at time of d/c would be no post acute OT services recommended.     Rehab Resource Intensity Level, OT: No post-acute rehabilitation needs

## 2025-03-17 NOTE — PHYSICAL THERAPY NOTE
PHYSICAL THERAPY EVALUATION        NAME:  Mary Carmen Woo  DATE: 03/17/25    AGE:   79 y.o.  Mrn:   [id number]  ADMIT DX:  Cough [R05.9]  Bronchitis [J40]  Hypertension [I10]  Influenza A [J10.1]  Elevated troponin [R79.89]  Hemoptysis [R04.2]    Past Medical History:   Diagnosis Date    GERD (gastroesophageal reflux disease)     Heart failure (HCC)     High blood cholesterol     High blood pressure      Length Of Stay: 1  Performed at least 2 patient identifiers during session: Name and Birthday      PHYSICAL THERAPY EVALUATION:       03/17/25 1449   PT Last Visit   PT Visit Date 03/17/25   Note Type   Note type Evaluation   Pain Assessment   Pain Assessment Tool 0-10   Pain Score No Pain   Restrictions/Precautions   Weight Bearing Precautions Per Order No   Other Precautions Contact/isolation;Droplet precautions;Chair Alarm;Bed Alarm;Fall Risk  (Flu)   Home Living   Type of Home House   Home Layout Two level;Bed/bath upstairs  (6 KAYLEEN R rail; 12 steps to 2nd flr L rail)   Bathroom Shower/Tub Tub/shower unit   Bathroom Toilet Raised   Bathroom Equipment Grab bars in shower;Grab bars around toilet;Shower chair   Home Equipment Walker;Cane;Reacher   Prior Function   Level of [place] Independent with ADLs;Independent with functional mobility;Independent with IADLS   Lives With Spouse;Son   Receives Help From Family   Falls in the last 6 months 0   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[employer]   Comments IND no AD   Cognition   Overall Cognitive Status WFL   Arousal/Participation Cooperative   Orientation Level Oriented X4   Following Commands Follows all commands and directions without difficulty   RLE Assessment   RLE Assessment WFL   LLE Assessment   LLE Assessment WFL   Light Touch   RLE Light Touch Grossly intact   LLE Light Touch Grossly intact   Transfers   Sit to Stand 6  Modified independent   Stand to Sit 6  Modified independent   Additional Comments No AD   Ambulation/Elevation   Gait pattern Step through pattern  "  Gait Assistance 5  Supervision   Assistive Device None   Distance 25 ft   Stair Management Assistance Not tested  (\"I'm not worried about doing them, I feel a whole lot better than when I came in\")   Balance   Static Sitting Normal   Dynamic Sitting Normal   Static Standing Good   Dynamic Standing Fair +   Ambulatory Fair +   Activity Tolerance   Activity Tolerance Patient tolerated treatment well   Assessment   Prognosis Excellent   Problem List Decreased endurance;Impaired balance;Decreased mobility   Barriers to Discharge None   Goals   STG Expiration Date 03/31/25   PT Treatment Day 0   Plan   Treatment/Interventions Elevations;Therapeutic exercise;Endurance training;Patient/family training;Gait training;Spoke to case management;LE strengthening/ROM   PT Frequency 1-2x/wk   Discharge Recommendation   Rehab Resource Intensity Level, PT No post-acute rehabilitation needs   AM-PAC Basic Mobility Inpatient   Turning in Flat Bed Without Bedrails 4   Lying on Back to Sitting on Edge of Flat Bed Without Bedrails 4   Moving Bed to Chair 4   Standing Up From Chair Using Arms 4   Walk in Room 3   Climb 3-5 Stairs With Railing 3   Basic Mobility Inpatient Raw Score 22   Basic Mobility Standardized Score 47.4   [facility] Highest Level Of Mobility   -HLM Goal 7: Walk 25 feet or more   -HLM Achieved 7: Walk 25 feet or more   End of Consult   Patient Position at End of Consult Bedside chair;All needs within reach     (Please find full objective findings from PT assessment regarding body systems outlined above).     Assessment: Pt is a 79 y.o. female seen for PT evaluation s/p admission to [facility] on 3/16/2025 with Moderate persistent asthma with acute exacerbation.  Order placed for PT services.  Upon evaluation: Pt is presenting with impaired functional mobility due to decreased strength, decreased endurance, impaired balance, gait deviations, and fall risk requiring  SPV assistance for " ambulation with no AD . Pt's clinical presentation is currently stable. Pt's PMHx and comorbidities that may affect physical performance and progress include: CHF and HTN. Pt will benefit from continued acute care skilled PT services to address deficits as defined above and to maximize level of functional mobility to facilitate return toward PLOF and improved QOL. From PT/mobility standpoint, recommendation at time of d/c would be No Post-Acute Rehabilitation Needs.     The patient's AM-PAC Basic Mobility Inpatient Short Form Raw Score is 22. A Raw score of greater than 16 suggests the patient may benefit from discharge to home. Please also refer to the recommendation of the Physical Therapist for safe discharge planning.       Goals:  Pt will ambulate with no AD for >/= 150 ft with   independently   to decrease risk for falls and improve activity tolerance and to access home environment. Pt will complete >/= 12 steps with with unilateral handrail with modified I to return to home with KAYLEEN and return to multilevel home.     .     Nima Figueroa, PT,DPT

## 2025-03-17 NOTE — PLAN OF CARE
Problem: Potential for Falls  Goal: Patient will remain free of falls  Description: INTERVENTIONS:  - Educate patient/family on patient safety including physical limitations  - Instruct patient to call for assistance with activity   - Consult OT/PT to assist with strengthening/mobility   - Keep Call bell within reach  - Keep bed low and locked with side rails adjusted as appropriate  - Keep care items and personal belongings within reach  - Initiate and maintain comfort rounds  - Make Fall Risk Sign visible to staff  - Offer Toileting every 2 Hours, in advance of need  - Initiate/Maintain bed alarm  - Obtain necessary fall risk management equipment: walker  - Apply yellow socks and bracelet for high fall risk patients  - Consider moving patient to room near nurses station  Outcome: Progressing     Problem: PAIN - ADULT  Goal: Verbalizes/displays adequate comfort level or baseline comfort level  Description: Interventions:  - Encourage patient to monitor pain and request assistance  - Assess pain using appropriate pain scale  - Administer analgesics based on type and severity of pain and evaluate response  - Implement non-pharmacological measures as appropriate and evaluate response  - Consider cultural and social influences on pain and pain management  - Notify physician/advanced practitioner if interventions unsuccessful or patient reports new pain  Outcome: Progressing     Problem: INFECTION - ADULT  Goal: Absence or prevention of progression during hospitalization  Description: INTERVENTIONS:  - Assess and monitor for signs and symptoms of infection  - Monitor lab/diagnostic results  - Monitor all insertion sites, i.e. indwelling lines, tubes, and drains  - Monitor endotracheal if appropriate and nasal secretions for changes in amount and color  - [place] appropriate cooling/warming therapies per order  - Administer medications as ordered  - Instruct and encourage patient and family to use good hand hygiene  technique  - Identify and instruct in appropriate isolation precautions for identified infection/condition  Outcome: Progressing  Goal: Absence of fever/infection during neutropenic period  Description: INTERVENTIONS:  - Monitor WBC    Outcome: Progressing     Problem: SAFETY ADULT  Goal: Patient will remain free of falls  Description: INTERVENTIONS:  - Educate patient/family on patient safety including physical limitations  - Instruct patient to call for assistance with activity   - Consult OT/PT to assist with strengthening/mobility   - Keep Call bell within reach  - Keep bed low and locked with side rails adjusted as appropriate  - Keep care items and personal belongings within reach  - Initiate and maintain comfort rounds  - Make Fall Risk Sign visible to staff  - Offer Toileting every 2 Hours, in advance of need  - Initiate/Maintain bed alarm  - Obtain necessary fall risk management equipment: walker   - Apply yellow socks and bracelet for high fall risk patients  - Consider moving patient to room near nurses station  Outcome: Progressing  Goal: Maintain or return to baseline ADL function  Description: INTERVENTIONS:  -  Assess patient's ability to carry out ADLs; assess patient's baseline for ADL function and identify physical deficits which impact ability to perform ADLs (bathing, care of mouth/teeth, toileting, grooming, dressing, etc.)  - Assess/evaluate cause of self-care deficits   - Assess range of motion  - Assess patient's mobility; develop plan if impaired  - Assess patient's need for assistive devices and provide as appropriate  - Encourage maximum independence but intervene and supervise when necessary  - Involve family in performance of ADLs  - Assess for home care needs following discharge   - Consider OT consult to assist with ADL evaluation and planning for discharge  - Provide patient education as appropriate  Outcome: Progressing  Goal: Maintains/Returns to pre admission functional  level  Description: INTERVENTIONS:  - Perform AM-PAC 6 Click Basic Mobility/ Daily Activity assessment daily.  - Set and communicate daily mobility goal to care team and patient/family/caregiver.   - Collaborate with rehabilitation services on mobility goals if consulted  - Perform Range of Motion 3 times a day.  - Reposition patient every 2 hours.  - Dangle patient 3 times a day  - Stand patient 3 times a day  - Ambulate patient 3 times a day  - Out of bed to chair 3 times a day   - Out of bed for meals 3 times a day  - Out of bed for toileting  - Record patient progress and toleration of activity level   Outcome: Progressing     Problem: DISCHARGE PLANNING  Goal: Discharge to home or other facility with appropriate resources  Description: INTERVENTIONS:  - Identify barriers to discharge w/patient and caregiver  - Arrange for needed discharge resources and transportation as appropriate  - Identify discharge learning needs (meds, wound care, etc.)  - Arrange for interpretive services to assist at discharge as needed  - Refer to Case Management Department for coordinating discharge planning if the patient needs post-hospital services based on physician/advanced practitioner order or complex needs related to functional status, cognitive ability, or social support system  Outcome: Progressing     Problem: Knowledge Deficit  Goal: Patient/family/caregiver demonstrates understanding of disease process, treatment plan, medications, and discharge instructions  Description: Complete learning assessment and assess knowledge base.  Interventions:  - Provide teaching at level of understanding  - Provide teaching via preferred learning methods  Outcome: Progressing     Problem: RESPIRATORY - ADULT  Goal: Achieves optimal ventilation and oxygenation  Description: INTERVENTIONS:  - Assess for changes in respiratory status  - Assess for changes in mentation and behavior  - Position to facilitate oxygenation and minimize  respiratory effort  - Oxygen administered by appropriate delivery if ordered  - Initiate smoking cessation education as indicated  - Encourage broncho-pulmonary hygiene including cough, deep breathe, Incentive Spirometry  - Assess the need for suctioning and aspirate as needed  - Assess and instruct to report SOB or any respiratory difficulty  - Respiratory Therapy support as indicated  Outcome: Progressing

## 2025-03-17 NOTE — PLAN OF CARE
Problem: PHYSICAL THERAPY ADULT  Goal: Performs mobility at highest level of function for planned discharge setting.  See evaluation for individualized goals.  Description: Treatment/Interventions: Elevations, Therapeutic exercise, Endurance training, Patient/family training, Gait training, Spoke to case management, LE strengthening/ROM          See flowsheet documentation for full assessment, interventions and recommendations.  Note: Prognosis: Excellent  Problem List: Decreased endurance, Impaired balance, Decreased mobility  Assessment: Pt is a 79 y.o. female seen for PT evaluation s/p admission to [facility] on 3/16/2025 with Moderate persistent asthma with acute exacerbation.  Order placed for PT services.  Upon evaluation: Pt is presenting with impaired functional mobility due to decreased strength, decreased endurance, impaired balance, gait deviations, and fall risk requiring  SPV assistance for ambulation with no AD . Pt's clinical presentation is currently stable. Pt's PMHx and comorbidities that may affect physical performance and progress include: CHF and HTN. Pt will benefit from continued acute care skilled PT services to address deficits as defined above and to maximize level of functional mobility to facilitate return toward PLOF and improved QOL. From PT/mobility standpoint, recommendation at time of d/c would be No Post-Acute Rehabilitation Needs.  Barriers to Discharge: None     Rehab Resource Intensity Level, PT: No post-acute rehabilitation needs    See flowsheet documentation for full assessment.

## 2025-03-17 NOTE — OCCUPATIONAL THERAPY NOTE
Occupational Therapy Evaluation     Patient Name: Mary Carmen Woo  Today's Date: 3/17/2025  Problem List  Principal Problem:    Moderate persistent asthma with acute exacerbation  Active Problems:    Hypertensive urgency    Elevated troponin    Chronic heart failure with preserved ejection fraction (HCC)    Chronic pulmonary embolism (HCC)    LYDIA (obstructive sleep apnea)    Past Medical History  Past Medical History:   Diagnosis Date    GERD (gastroesophageal reflux disease)     Heart failure (HCC)     High blood cholesterol     High blood pressure      Past Surgical History  Past Surgical History:   Procedure Laterality Date    APPENDECTOMY      BACK SURGERY      CATARACT EXTRACTION Bilateral     CHOLECYSTECTOMY      REPLACEMENT TOTAL KNEE Bilateral     TUBAL LIGATION             03/17/25 1432   OT Last Visit   OT Visit Date 03/17/25   Note Type   Note type Evaluation   Pain Assessment   Pain Assessment Tool 0-10   Pain Score No Pain   Restrictions/Precautions   Weight Bearing Precautions Per Order No   Other Precautions Contact/isolation;Droplet precautions;Chair Alarm;Bed Alarm;Fall Risk;Hard of hearing  (influenza A)   Home Living   Type of Home House  (6 KAYLE[address] front entrance with R HR)   Home Layout Two level;Bed/bath upstairs  (12 steps to second floor with L HR; pt also has basement, but rarely goes down and laundry is on the first floor)   Bathroom Shower/Tub Tub/shower unit   Bathroom Toilet Raised   Bathroom Equipment Grab bars in shower;Grab bars around toilet;Shower chair   Bathroom Accessibility Accessible   Home Equipment Walker;Cane;Reacher;Grab bars  (2 walkers, 2 canes)   Additional Comments Pt uses no device at baseline (used devices prior to getting knees replaced in 6/24 and 5/21).   Prior Function   Level of [place] Independent with ADLs;Independent with functional mobility;Independent with IADLS   Lives With Spouse;Son   Receives Help From Family   IADLs Independent with  driving;Independent with meal prep;Independent with medication management   Falls in the last 6 months 0   Vocational Part time employment  ( at [employer])   General   Additional General Comments Pt received breathing treatment from RT immediately prior to evaluation.   ADL   Where Assessed Chair   UB Dressing Assistance 7  Independent   LB Dressing Assistance 6  Modified independent   LB Dressing Deficit   (donning/doffing B socks)   Bed Mobility   Additional Comments Pt OOB in recliner upon therapy arrival.   Transfers   Sit to Stand 6  Modified independent   Stand to Sit 6  Modified independent   Stand pivot 5  Supervision   Additional Comments Pt walked short household distance with no device and SPV, no SOB observed.   Functional Mobility   Functional Mobility 5  Supervision   Additional Comments Pt walked short household distance with no device and SPV, no SOB observed.   Balance   Static Sitting Normal   Dynamic Sitting Normal   Static Standing Fair +   Dynamic Standing Fair   Activity Tolerance   Activity Tolerance Patient limited by fatigue   Medical Staff Made Aware PT [place]   RUE Assessment   RUE Assessment WFL  (strength grossly 4-/5)   LUE Assessment   LUE Assessment WFL  (strength grossly 4-/5)   Hand Function   Gross Motor Coordination Functional   Fine Motor Coordination Impaired  (difficulty opening containers)   Hand Function Comments R Hand Dominant   Vision-Basic Assessment   Current Vision Wears glasses all the time   Psychosocial   Psychosocial (WDL) WDL   Cognition   Overall Cognitive Status WFL   Arousal/Participation Alert;Responsive;Cooperative   Attention Within functional limits   Orientation Level Oriented X4   Memory Within functional limits   Following Commands Follows one step commands without difficulty   Assessment   Limitation Decreased UE strength;Decreased endurance;Decreased self-care trans;Decreased high-level ADLs;Decreased fine motor control   Prognosis Good    Assessment Pt is a 79 y.o. female, admitted to [facility] 3/16/2025 d/t experiencing shortness of breath, fatigue, and productive cough. Dx: moderate persistent asthma with acute exacerbation and influenze A. Pt with PMHx impacting their performance during ADL tasks, including: asthma, CHF, HTN, LYDIA, B TKA, GERD. Prior to admission to the hospital Pt was performing ADLs without physical assistance. IADLs without physical assistance. Functional transfers/ambulation without physical assistance. Cognitive status was PTA was WFL. OT order placed to assess Pt's ADLs, cognitive status, and performance during functional tasks in order to maximize safety and independence while making most appropriate d/c recommendations. Pt's clinical presentation is currently evolving given new onset deficits that effect Pt's occupational performance and ability to safely return to PLOF including decrease activity tolerance, decrease standing balance, decrease generalized strength, decrease activity engagement, decrease performance during functional transfers, decrease FM control, and steps to enter home combined with medical complications of hypertension , abnormal H&H, abnormal CBC, abnormal CO2 values, and need for input for mobility technique/safety. Personal factors affecting Pt at time of initial evaluation include: step(s) to enter environment, multi-level environment, inability to perform current job functions, inability to perform IADLs, inability to perform ADLs, inability to ambulate household distances, inability to navigate community distances, and decreased initiation and engagement. Pt will benefit from continued skilled OT services to address deficits as defined above and to maximize level independence/participation during ADLs and functional tasks to facilitate return toward PLOF and improved quality of life. From an occupational therapy standpoint, recommendation at time of d/c would be no post acute OT services recommended.    Plan   Treatment Interventions Functional transfer training;UE strengthening/ROM;Endurance training;Patient/family training;Fine motor coordination activities;Compensatory technique education;Energy conservation;Activityengagement;Cardiac education   Goal Expiration Date 03/31/25   OT Frequency 1-2x/wk   Discharge Recommendation   Rehab Resource Intensity Level, OT No post-acute rehabilitation needs   AM-PAC Daily Activity Inpatient   Lower Body Dressing 4   Bathing 4   Toileting 4   Upper Body Dressing 4   Grooming 4   Eating 4   Daily Activity Raw Score 24   Daily Activity Standardized Score (Calc for Raw Score >=11) 57.54   AM-PAC Applied Cognition Inpatient   Following a Speech/Presentation 3   Understanding Ordinary Conversation 4   Taking Medications 4   Remembering Where Things Are Placed or Put Away 4   Remembering List of 4-5 Errands 4   Taking Care of Complicated Tasks 4   Applied Cognition Raw Score 23   Applied Cognition Standardized Score 53.08   End of Consult   Education Provided Yes   Patient Position at End of Consult Bedside chair;All needs within reach   End of Consult Comments Pt left with PT for further evaluation.     The patient's raw score on the AM-PAC Daily Activity Inpatient Short Form is 24. A raw score of greater than or equal to 19 suggests the patient may benefit from discharge to home. Please refer to the recommendation of the Occupational Therapist for safe discharge planning.    Pt goals to be met by 3/31/2025    Pt will demonstrate ability to complete EOB, chair, toilet/commode transfers @ independent in order to increase performance and participation during functional tasks.  Pt will demonstrate ability to stand for 60 minutes while maintaining good balance with use of no device for UB support PRN.  Pt will demonstrate ability to tolerate 30-35 minute OT session with no vc'ing for deep breathing or use of energy conservation techniques in order to increase activity tolerance  during functional tasks.   Pt will demonstrate Good carryover of use of energy conservation/compensatory strategies during ADLs and functional tasks in order to increase safety and reduce risk for falls.   Pt will demonstrate Good attention and participation in continued evaluation of functional ambulation house hold distances in order to assist with safe d/c planning.  Pt will attend to continued cognitive assessments 100% of the time in order to provide most appropriate d/c recommendations.   Pt will follow 100% simple 2-step commands and be A&O x4 consistently with environmental cues to increase participation in functional activities.   Pt will identify 3 areas of interest/hobbies and 1 intervention on how to incorporate into daily life in order to increase interaction with environment and peers as well as increase participation in meaningful tasks.   Pt will demonstrate 100% carryover of BUE HEP in order to increase BUE MS and increase performance during functional tasks upon d/c home.    Toro Andujar, OTR/L

## 2025-03-17 NOTE — ASSESSMENT & PLAN NOTE
Wt Readings from Last 3 Encounters:   03/16/25 85.5 kg (188 lb 9.6 oz)   06/22/24 95.3 kg (210 lb)   06/22/24 95.6 kg (210 lb 12.8 oz)     Echo from June 2023 with a normal EF of 58%, no wall motion abnormalities, grade 1 diastolic dysfunction, no significant valvular abnormality.  Denies any weight gain or lower extremity edema  Continue with current outpatient dose of diuretics  Follow-up on repeat echocardiogram in view of elevated troponins

## 2025-03-18 PROBLEM — J20.9 ACUTE TRACHEOBRONCHITIS: Status: ACTIVE | Noted: 2025-03-18

## 2025-03-18 PROBLEM — R04.2 HEMOPTYSIS: Status: ACTIVE | Noted: 2025-03-18

## 2025-03-18 PROBLEM — R93.89 ABNORMAL CT OF THE CHEST: Status: ACTIVE | Noted: 2025-03-18

## 2025-03-18 PROBLEM — Z86.711 HISTORY OF PULMONARY EMBOLISM: Status: ACTIVE | Noted: 2025-03-16

## 2025-03-18 PROBLEM — N17.9 AKI (ACUTE KIDNEY INJURY) (HCC): Status: ACTIVE | Noted: 2025-03-18

## 2025-03-18 LAB
ANION GAP SERPL CALCULATED.3IONS-SCNC: 8 MMOL/L (ref 4–13)
BUN SERPL-MCNC: 54 MG/DL (ref 5–25)
CALCIUM SERPL-MCNC: 8.7 MG/DL (ref 8.4–10.2)
CHLORIDE SERPL-SCNC: 108 MMOL/L (ref 96–108)
CO2 SERPL-SCNC: 22 MMOL/L (ref 21–32)
CREAT SERPL-MCNC: 1.38 MG/DL (ref 0.6–1.3)
ERYTHROCYTE [DISTWIDTH] IN BLOOD BY AUTOMATED COUNT: 14.4 % (ref 11.6–15.1)
GFR SERPL CREATININE-BSD FRML MDRD: 36 ML/MIN/1.73SQ M
GLUCOSE SERPL-MCNC: 198 MG/DL (ref 65–140)
HCT VFR BLD AUTO: 30.7 % (ref 34.8–46.1)
HGB BLD-MCNC: 10.2 G/DL (ref 11.5–15.4)
MAGNESIUM SERPL-MCNC: 1.9 MG/DL (ref 1.9–2.7)
MCH RBC QN AUTO: 33.8 PG (ref 26.8–34.3)
MCHC RBC AUTO-ENTMCNC: 33.2 G/DL (ref 31.4–37.4)
MCV RBC AUTO: 102 FL (ref 82–98)
PLATELET # BLD AUTO: 186 THOUSANDS/UL (ref 149–390)
PMV BLD AUTO: 11.2 FL (ref 8.9–12.7)
POTASSIUM SERPL-SCNC: 4 MMOL/L (ref 3.5–5.3)
RBC # BLD AUTO: 3.02 MILLION/UL (ref 3.81–5.12)
SODIUM SERPL-SCNC: 138 MMOL/L (ref 135–147)
WBC # BLD AUTO: 11.64 THOUSAND/UL (ref 4.31–10.16)

## 2025-03-18 PROCEDURE — 83735 ASSAY OF MAGNESIUM: CPT

## 2025-03-18 PROCEDURE — 94760 N-INVAS EAR/PLS OXIMETRY 1: CPT

## 2025-03-18 PROCEDURE — 85027 COMPLETE CBC AUTOMATED: CPT

## 2025-03-18 PROCEDURE — 99232 SBSQ HOSP IP/OBS MODERATE 35: CPT

## 2025-03-18 PROCEDURE — 94640 AIRWAY INHALATION TREATMENT: CPT

## 2025-03-18 PROCEDURE — 80048 BASIC METABOLIC PNL TOTAL CA: CPT

## 2025-03-18 PROCEDURE — 99223 1ST HOSP IP/OBS HIGH 75: CPT | Performed by: PHYSICIAN ASSISTANT

## 2025-03-18 RX ORDER — WARFARIN SODIUM 5 MG/1
5 TABLET ORAL
Status: DISCONTINUED | OUTPATIENT
Start: 2025-03-18 | End: 2025-03-19 | Stop reason: HOSPADM

## 2025-03-18 RX ORDER — SODIUM CHLORIDE 9 MG/ML
100 INJECTION, SOLUTION INTRAVENOUS CONTINUOUS
Status: DISPENSED | OUTPATIENT
Start: 2025-03-18 | End: 2025-03-18

## 2025-03-18 RX ADMIN — FLUTICASONE FUROATE AND VILANTEROL TRIFENATATE 1 PUFF: 200; 25 POWDER RESPIRATORY (INHALATION) at 08:47

## 2025-03-18 RX ADMIN — GUAIFENESIN 600 MG: 600 TABLET, EXTENDED RELEASE ORAL at 21:10

## 2025-03-18 RX ADMIN — DOXYCYCLINE 100 MG: 100 CAPSULE ORAL at 21:10

## 2025-03-18 RX ADMIN — GUAIFENESIN 600 MG: 600 TABLET, EXTENDED RELEASE ORAL at 08:42

## 2025-03-18 RX ADMIN — Medication 1000 UNITS: at 08:42

## 2025-03-18 RX ADMIN — DOXYCYCLINE 100 MG: 100 CAPSULE ORAL at 08:42

## 2025-03-18 RX ADMIN — GABAPENTIN 800 MG: 400 CAPSULE ORAL at 21:10

## 2025-03-18 RX ADMIN — IPRATROPIUM BROMIDE AND ALBUTEROL SULFATE 3 ML: 2.5; .5 SOLUTION RESPIRATORY (INHALATION) at 13:27

## 2025-03-18 RX ADMIN — FOLIC ACID 1 MG: 1 TABLET ORAL at 08:42

## 2025-03-18 RX ADMIN — METHYLPREDNISOLONE SODIUM SUCCINATE 40 MG: 40 INJECTION, POWDER, FOR SOLUTION INTRAMUSCULAR; INTRAVENOUS at 08:45

## 2025-03-18 RX ADMIN — MONTELUKAST 10 MG: 10 TABLET, FILM COATED ORAL at 21:10

## 2025-03-18 RX ADMIN — GABAPENTIN 800 MG: 400 CAPSULE ORAL at 08:42

## 2025-03-18 RX ADMIN — WARFARIN SODIUM 5 MG: 5 TABLET ORAL at 18:12

## 2025-03-18 RX ADMIN — PANTOPRAZOLE SODIUM 40 MG: 40 TABLET, DELAYED RELEASE ORAL at 05:17

## 2025-03-18 RX ADMIN — SODIUM CHLORIDE 100 ML/HR: 0.9 INJECTION, SOLUTION INTRAVENOUS at 08:43

## 2025-03-18 RX ADMIN — GABAPENTIN 800 MG: 400 CAPSULE ORAL at 18:12

## 2025-03-18 RX ADMIN — ATORVASTATIN CALCIUM 40 MG: 40 TABLET, FILM COATED ORAL at 21:10

## 2025-03-18 RX ADMIN — CARVEDILOL 12.5 MG: 12.5 TABLET, FILM COATED ORAL at 08:42

## 2025-03-18 RX ADMIN — CARVEDILOL 12.5 MG: 12.5 TABLET, FILM COATED ORAL at 18:12

## 2025-03-18 RX ADMIN — METHYLPREDNISOLONE SODIUM SUCCINATE 40 MG: 40 INJECTION, POWDER, FOR SOLUTION INTRAMUSCULAR; INTRAVENOUS at 21:10

## 2025-03-18 NOTE — ASSESSMENT & PLAN NOTE
Noted coarse interstitial markings with bibasilar predominance.  Per chart review it appears that she has had bibasilar markings dating back to 2023 but no images to review in PACs. I will see if we can obtain these for review.  She denies known history of autoimmune disease but does have ulnar deviation on exam raising concern for RA and in turn RA related ILD.  Check JARRETT, RF,  and CCP while inpatient  Follow up with pulmonary as outpatient

## 2025-03-18 NOTE — ASSESSMENT & PLAN NOTE
Patient developed KALLI on 3/18 with creatinine 1.38 from 1.06  Likely in setting of dehydration secondary to the flu, other differential included contrast induced nephropathy   Will start on IV fluid  Check urine studies  Check urinary retention protocol  Hold Lasix and lisinopril  Follow-up labs in the morning

## 2025-03-18 NOTE — CONSULTS
Consultation - Pulmonology   Name: Mary Carmen Woo 79 y.o. female I MRN: [id number]  Unit/Bed#: -01 I Date of Admission: 3/16/2025   Date of Service: 3/18/2025 I Hospital Day: 2   Inpatient consult to Pulmonology  Consult performed by: Jairon Colunga PA-C  Consult ordered by: Pratibha Bhatti MD        Physician Requesting Evaluation: Sherrie Rucker MD   Reason for Evaluation / Principal Problem: hemoptysis     Assessment & Plan  Moderate persistent asthma with acute exacerbation  Discontinue Solu-Medrol after morning dose today.  Start prednisone 40 mg p.o. daily tomorrow.  Plan to taper by 10 mg every 3 days until completion.  Continue Breo  Continue DuoNeb-decreased dosing to 3 times daily  Continue singular 10 mg p.o. daily before bed  Acute tracheobronchitis  Complete 5-7 day course of doxycyline   Supportive care for cough  Hemoptysis  Resolving- none reported today.   Likely secondary to tracheobronchitis above made worse by AC use   No indication for bronchoscopy at this time.   Abnormal CT of the chest  Noted coarse interstitial markings with bibasilar predominance.  Per chart review it appears that she has had bibasilar markings dating back to 2023 but no images to review in PACs. I will see if we can obtain these for review.  She denies known history of autoimmune disease but does have ulnar deviation on exam raising concern for RA and in turn RA related ILD.  Check JARRETT, RF,  and CCP while inpatient  Follow up with pulmonary as outpatient  History of pulmonary embolism  No acute or chronic PE noted on CTA chest PE study this admission  May resume Warfarin from pulmonary standpoint.   LYDIA (obstructive sleep apnea)  Recommended CPAP nightly but patient declined  May use supplemental oxygen nocturnally if needed.    I have discussed the above management plan in detail with the primary service.   Pulmonology service will follow.    History of Present Illness   Mary Carmen Woo is a 79 y.o. female who  presents with shortness of breath.  Patient has past medical history positive for CHF, asthma, history of DVT/PE on Coumadin, hypertension, LYDIA on CPAP.  She reports several days of increased shortness of breath, cough, fatigue, generalized malaise.  Patient states that she was diagnosed with influenza as an outpatient prescribed Tamiflu due to an outside the therapeutic window.  Patient then came to the ED 2 days ago when she started to cough up dark red blood.  Fired at was coughing up mucus that was dark yellow/green.  She reports multiple family members sick with similar symptoms.  She believes she caught her illness from working at [employer].  She was admitted for asthma exacerbation.  CT imaging was negative for acute PE but did show stable mild interstitial markings bibasilarly.  Pulmonary was consulted to help manage and her hemoptysis .  Today, patient reports that she has not had any episodes of hemoptysis.    Patient follows with [facility] respiratory specialist for her LYDIA.  She also carries with her diagnosis of asthma and is maintained on generic Advair which her PCP manages.  She reports very infrequent exacerbations and cannot recall the last time she was on steroids prior to this admission.  She is a lifelong non-smoker.  Patient denies occupational exposures to asbestos, silica, coal, dust, chemicals.  Works in retail.  Denies recent travel.  Denies bird exposure.  Denies known autoimmune disease.    Review of Systems   All other systems reviewed and are negative.      Historical Information   Historical Information   Past Medical History:   Diagnosis Date    GERD (gastroesophageal reflux disease)     Heart failure (HCC)     High blood cholesterol     High blood pressure      Past Surgical History:   Procedure Laterality Date    APPENDECTOMY      BACK SURGERY      CATARACT EXTRACTION Bilateral     CHOLECYSTECTOMY      REPLACEMENT TOTAL KNEE Bilateral     TUBAL LIGATION       Social History     Tobacco  Use    Smoking status: Never     Passive exposure: Never    Smokeless tobacco: Never   Vaping Use    Vaping status: Never Used   Substance and Sexual Activity    Alcohol use: Not Currently    Drug use: Never    Sexual activity: Not on file     E-Cigarette/Vaping    E-Cigarette Use Never User      E-Cigarette/Vaping Substances     Tobacco History: never   Occupational History: noncontributory  Family History:History reviewed. No pertinent family history.    Objective :  Temp:  [97.3 °F (36.3 °C)] 97.3 °F (36.3 °C)  HR:  [69-75] 75  BP: (135-151)/(68-75) 135/68  Resp:  [18] 18  SpO2:  [90 %-96 %] 92 %  O2 Device: None (Room air)    Physical Exam  Vitals reviewed.   Constitutional:       Appearance: Normal appearance. She is well-developed.   HENT:      Head: Normocephalic and atraumatic.      Nose: Nose normal.      Mouth/Throat:      Mouth: Mucous membranes are moist.   Eyes:      Extraocular Movements: Extraocular movements intact.   Cardiovascular:      Rate and Rhythm: Normal rate and regular rhythm.      Heart sounds: Normal heart sounds.   Pulmonary:      Effort: Pulmonary effort is normal. No respiratory distress.      Breath sounds: Rhonchi (bibasilar) present. No wheezing or rales.   Musculoskeletal:         General: Deformity (ulnar deviation bilaterally) present. No swelling.   Skin:     General: Skin is warm and dry.   Neurological:      Mental Status: She is alert. Mental status is at baseline.   Psychiatric:         Mood and Affect: Mood normal.         Behavior: Behavior normal.           Lab Results: I have reviewed the following results:  .     03/18/25  0453   WBC 11.64*   HGB 10.2*   HCT 30.7*      SODIUM 138   K 4.0      CO2 22   BUN 54*   CREATININE 1.38*   GLUC 198*   MG 1.9     ABG: No new results in last 24 hours.    Imaging Results Review: I personally reviewed the following image studies in PACS and associated radiology reports: CT chest. My interpretation of the radiology  images/reports is: as below.  IMPRESSION:     1.  No pulmonary artery emboli.  2.  Coarsened chronic interstitial lung markings predominantly in the lung bases, similar to the prior exam. No new consolidation.  3.  Prominent mediastinal and right hilar nodes may be reactive. Consider 3-month chest CT follow-up to ensure stability/resolution.    PFT Results Reviewed: none to dionne w    VTE Prophylaxis: on coumadin

## 2025-03-18 NOTE — ASSESSMENT & PLAN NOTE
Discontinue Solu-Medrol after morning dose today.  Start prednisone 40 mg p.o. daily tomorrow.  Plan to taper by 10 mg every 3 days until completion.  Continue Breo  Continue DuoNeb-decreased dosing to 3 times daily  Continue singular 10 mg p.o. daily before bed

## 2025-03-18 NOTE — ASSESSMENT & PLAN NOTE
No acute or chronic PE noted on CTA chest PE study this admission  May resume Warfarin from pulmonary standpoint.

## 2025-03-18 NOTE — ASSESSMENT & PLAN NOTE
Blood pressure elevated at 232/103 on admission  PTA lisinopril, metoprolol, furosemide  Metoprolol has been changed to coreg for better BP control   Lisinopril, furosemide on hold with developing KALLI  Use as needed IV hydralazine for SBP greater than 180

## 2025-03-18 NOTE — ASSESSMENT & PLAN NOTE
Maintained on warfarin.  Will be placed on hold in view of hemoptysis.  Without hemoptysis last 24 hours will resume

## 2025-03-18 NOTE — PROGRESS NOTES
Progress Note - Hospitalist   Name: Mary Carmen Woo 79 y.o. female I MRN: [id number]  Unit/Bed#: -01 I Date of Admission: 3/16/2025   Date of Service: 3/18/2025 I Hospital Day: 2    Assessment & Plan  Moderate persistent asthma with acute exacerbation  Presents to ED with SOB and cough with hemoptysis   Recently diagnosed three days prior to admission in ED with influenza - ongoing symptoms x 1 week   CT chest:   Suspect post-viral bronchitis   Trial of steroids - wheezing is improved, continue q 12 hours yesterday and wean to prednisone tomorrow   Doxycycline  Duo-nebs  Coumadin on hold due to hemoptysis - Hgb stable   Will resume coumadin as has been without hemoptysis x 24 hours  Appreciate pulmonology input   KALLI (acute kidney injury) (HCC)  Patient developed KALLI on 3/18 with creatinine 1.38 from 1.06  Likely in setting of dehydration secondary to the flu, other differential included contrast induced nephropathy   Will start on IV fluid  Check urine studies  Check urinary retention protocol  Hold Lasix and lisinopril  Follow-up labs in the morning  Hypertensive urgency  Blood pressure elevated at 232/103 on admission  PTA lisinopril, metoprolol, furosemide  Metoprolol has been changed to coreg for better BP control   Lisinopril, furosemide on hold with developing KALLI  Use as needed IV hydralazine for SBP greater than 180  Elevated troponin  EKG with chronic T wave inversions and right bundle branch block  Likely non MI troponin elevation in the setting of acute influenza and asthma exacerbation  Follow-up on serial troponin - 312-> 277 -> 263  Trending down   Continue beta-blocker and statin  Hold aspirin in the setting of hemoptysis  Follow-up on echocardiogram -LV with mild concentric hypertrophy, LVEF 68% with normal systolic function, no diagnostic regional wall motion abnormality identified.  Diastolic function is mildly abnormal consistent with grade 1 relaxation and left atrial filling pressures  elevated  Patient currently denies any chest pain  Chronic heart failure with preserved ejection fraction (HCC)  Wt Readings from Last 3 Encounters:   03/17/25 85.3 kg (188 lb)   06/22/24 95.3 kg (210 lb)   06/22/24 95.6 kg (210 lb 12.8 oz)     Echo from June 2023 with a normal EF of 58%, no wall motion abnormalities, grade 1 diastolic dysfunction, no significant valvular abnormality.  Denies any weight gain or lower extremity edema  Continue with current outpatient dose of diuretics  Follow-up on repeat echocardiogram in view of elevated troponins    Chronic pulmonary embolism (HCC)  Maintained on warfarin.  Will be placed on hold in view of hemoptysis.  Without hemoptysis last 24 hours will resume   LYDIA (obstructive sleep apnea)  CPAP as tolerated - currently declining due to nasal congestion, agreeable to oxygen overnight     VTE Pharmacologic Prophylaxis: VTE Score: 4 Moderate Risk (Score 3-4) - Pharmacological DVT Prophylaxis Ordered: warfarin (Coumadin).    Mobility:   Basic Mobility Inpatient Raw Score: 22  JH-HLM Goal: 7: Walk 25 feet or more  JH-HLM Achieved: 7: Walk 25 feet or more  JH-HLM Goal achieved. Continue to encourage appropriate mobility.    Patient Centered Rounds: I performed bedside rounds with nursing staff today.   Discussions with Specialists or Other Care Team Provider: CM    Education and Discussions with Family / Patient: Updated  () via phone.    Current Length of Stay: 2 day(s)  Current Patient Status: Inpatient   Certification Statement: The patient will continue to require additional inpatient hospital stay due to KALLI  Discharge Plan: Anticipate discharge in 24-48 hrs to home.    Code Status: Level 1 - Full Code    Subjective   Seen and examined.  Patient did not sleep well overnight and is feeling tired this morning.     Objective :  Temp:  [97.3 °F (36.3 °C)] 97.3 °F (36.3 °C)  HR:  [69-82] 75  BP: (135-151)/(68-75) 135/68  Resp:  [18] 18  SpO2:  [90 %-97 %] 96  %  O2 Device: None (Room air)    Body mass index is 32.27 kg/m².     Input and Output Summary (last 24 hours):     Intake/Output Summary (Last 24 hours) at 3/18/2025 0915  Last data filed at 3/17/2025 0921  Gross per 24 hour   Intake 180 ml   Output --   Net 180 ml       Physical Exam  Vitals and nursing note reviewed.   Constitutional:       General: She is not in acute distress.     Appearance: Normal appearance. She is not ill-appearing.   HENT:      Head: Normocephalic and atraumatic.      Nose: No congestion.      Mouth/Throat:      Mouth: Mucous membranes are dry.      Pharynx: Oropharynx is clear.   Eyes:      Conjunctiva/sclera: Conjunctivae normal.   Cardiovascular:      Rate and Rhythm: Normal rate and regular rhythm.      Pulses: Normal pulses.      Heart sounds: Normal heart sounds. No murmur heard.  Pulmonary:      Effort: Pulmonary effort is normal.      Breath sounds: Normal breath sounds.      Comments: Diminished  Abdominal:      General: Bowel sounds are normal.      Palpations: Abdomen is soft.      Tenderness: There is no abdominal tenderness.   Musculoskeletal:         General: Normal range of motion.      Cervical back: Normal range of motion.      Right lower leg: No edema.      Left lower leg: No edema.   Skin:     General: Skin is warm and dry.   Neurological:      Mental Status: She is alert and oriented to person, place, and time.   Psychiatric:         Mood and Affect: Mood normal.         Behavior: Behavior normal.           Lines/Drains:              Lab Results: I have reviewed the following results:   Results from last 7 days   Lab Units 03/18/25  0453 03/17/25  0556   WBC Thousand/uL 11.64* 4.62   HEMOGLOBIN g/dL 10.2* 11.0*   HEMATOCRIT % 30.7* 33.6*   PLATELETS Thousands/uL 186 202   SEGS PCT %  --  77*   LYMPHO PCT %  --  18   MONO PCT %  --  4   EOS PCT %  --  0     Results from last 7 days   Lab Units 03/18/25  0453 03/17/25  0556 03/16/25  1341   SODIUM mmol/L 138   < > 143    POTASSIUM mmol/L 4.0   < > 3.7   CHLORIDE mmol/L 108   < > 109*   CO2 mmol/L 22   < > 23   BUN mg/dL 54*   < > 25   CREATININE mg/dL 1.38*   < > 0.96   ANION GAP mmol/L 8   < > 11   CALCIUM mg/dL 8.7   < > 9.1   ALBUMIN g/dL  --   --  4.0   TOTAL BILIRUBIN mg/dL  --   --  0.80   ALK PHOS U/L  --   --  54   ALT U/L  --   --  9   AST U/L  --   --  21   GLUCOSE RANDOM mg/dL 198*   < > 109    < > = values in this interval not displayed.     Results from last 7 days   Lab Units 03/17/25  0556   INR  2.84*             Results from last 7 days   Lab Units 03/17/25  0556   PROCALCITONIN ng/ml 0.09       Recent Cultures (last 7 days):   Results from last 7 days   Lab Units 03/16/25  1350 03/16/25  1341   BLOOD CULTURE  No Growth at 24 hrs. No Growth at 24 hrs.             Last 24 Hours Medication List:     Current Facility-Administered Medications:     acetaminophen (TYLENOL) tablet 650 mg, Q6H PRN    atorvastatin (LIPITOR) tablet 40 mg, HS    carvedilol (COREG) tablet 12.5 mg, BID With Meals    Cholecalciferol (VITAMIN D3) tablet 1,000 Units, Daily    doxycycline hyclate (VIBRAMYCIN) capsule 100 mg, Q12H CULLEN    fluticasone-vilanterol 200-25 mcg/actuation 1 puff, Daily    folic acid (FOLVITE) tablet 1 mg, Daily    gabapentin (NEURONTIN) capsule 800 mg, TID    guaiFENesin (MUCINEX) 12 hr tablet 600 mg, Q12H CULLEN    hydrALAZINE (APRESOLINE) injection 5 mg, Q6H PRN    ipratropium-albuterol (DUO-NEB) 0.5-2.5 mg/3 mL inhalation solution 3 mL, Q6H While awake    [Held by provider] lisinopril (ZESTRIL) tablet 20 mg, Daily    methylPREDNISolone sodium succinate (Solu-MEDROL) injection 40 mg, Q12H CULLEN    montelukast (SINGULAIR) tablet 10 mg, HS    ondansetron (ZOFRAN) injection 4 mg, Q6H PRN    pantoprazole (PROTONIX) EC tablet 40 mg, Early Morning    sodium chloride 0.9 % infusion, Continuous, Last Rate: 100 mL/hr (03/18/25 0706)    warfarin (COUMADIN) tablet 5 mg, Daily (warfarin)    Administrative Statements   Today, Patient Was  Seen By: Amaya Colbert PA-C      **Please Note: This note may have been constructed using a voice recognition system.**

## 2025-03-18 NOTE — ASSESSMENT & PLAN NOTE
Resolving- none reported today.   Likely secondary to tracheobronchitis above made worse by AC use   No indication for bronchoscopy at this time.

## 2025-03-18 NOTE — PLAN OF CARE
Problem: INFECTION - ADULT  Goal: Absence or prevention of progression during hospitalization  Description: INTERVENTIONS:  - Assess and monitor for signs and symptoms of infection  - Monitor lab/diagnostic results  - Monitor all insertion sites, i.e. indwelling lines, tubes, and drains  - Monitor endotracheal if appropriate and nasal secretions for changes in amount and color  - [place] appropriate cooling/warming therapies per order  - Administer medications as ordered  - Instruct and encourage patient and family to use good hand hygiene technique  - Identify and instruct in appropriate isolation precautions for identified infection/condition  Outcome: Progressing     Problem: DISCHARGE PLANNING  Goal: Discharge to home or other facility with appropriate resources  Description: INTERVENTIONS:  - Identify barriers to discharge w/patient and caregiver  - Arrange for needed discharge resources and transportation as appropriate  - Identify discharge learning needs (meds, wound care, etc.)  - Arrange for interpretive services to assist at discharge as needed  - Refer to Case Management Department for coordinating discharge planning if the patient needs post-hospital services based on physician/advanced practitioner order or complex needs related to functional status, cognitive ability, or social support system  Outcome: Progressing     Problem: RESPIRATORY - ADULT  Goal: Achieves optimal ventilation and oxygenation  Description: INTERVENTIONS:  - Assess for changes in respiratory status  - Assess for changes in mentation and behavior  - Position to facilitate oxygenation and minimize respiratory effort  - Oxygen administered by appropriate delivery if ordered  - Initiate smoking cessation education as indicated  - Encourage broncho-pulmonary hygiene including cough, deep breathe, Incentive Spirometry  - Assess the need for suctioning and aspirate as needed  - Assess and instruct to report SOB or any respiratory  difficulty  - Respiratory Therapy support as indicated  Outcome: Progressing

## 2025-03-18 NOTE — ASSESSMENT & PLAN NOTE
Presents to ED with SOB and cough with hemoptysis   Recently diagnosed three days prior to admission in ED with influenza - ongoing symptoms x 1 week   CT chest:   Suspect post-viral bronchitis   Trial of steroids - wheezing is improved, continue q 12 hours yesterday and wean to prednisone tomorrow   Doxycycline  Duo-nebs  Coumadin on hold due to hemoptysis - Hgb stable   Will resume coumadin as has been without hemoptysis x 24 hours  Appreciate pulmonology input

## 2025-03-18 NOTE — ASSESSMENT & PLAN NOTE
Wt Readings from Last 3 Encounters:   03/17/25 85.3 kg (188 lb)   06/22/24 95.3 kg (210 lb)   06/22/24 95.6 kg (210 lb 12.8 oz)     Echo from June 2023 with a normal EF of 58%, no wall motion abnormalities, grade 1 diastolic dysfunction, no significant valvular abnormality.  Denies any weight gain or lower extremity edema  Continue with current outpatient dose of diuretics  Follow-up on repeat echocardiogram in view of elevated troponins

## 2025-03-19 ENCOUNTER — TELEPHONE (OUTPATIENT)
Dept: PULMONOLOGY | Facility: CLINIC | Age: 80
End: 2025-03-19

## 2025-03-19 VITALS
SYSTOLIC BLOOD PRESSURE: 168 MMHG | TEMPERATURE: 98.1 F | HEART RATE: 72 BPM | RESPIRATION RATE: 20 BRPM | HEIGHT: 64 IN | OXYGEN SATURATION: 91 % | WEIGHT: 188 LBS | BODY MASS INDEX: 32.1 KG/M2 | DIASTOLIC BLOOD PRESSURE: 83 MMHG

## 2025-03-19 LAB
ANION GAP SERPL CALCULATED.3IONS-SCNC: 6 MMOL/L (ref 4–13)
BUN SERPL-MCNC: 58 MG/DL (ref 5–25)
CALCIUM SERPL-MCNC: 8.4 MG/DL (ref 8.4–10.2)
CHLORIDE SERPL-SCNC: 111 MMOL/L (ref 96–108)
CO2 SERPL-SCNC: 23 MMOL/L (ref 21–32)
CREAT SERPL-MCNC: 1.11 MG/DL (ref 0.6–1.3)
ERYTHROCYTE [DISTWIDTH] IN BLOOD BY AUTOMATED COUNT: 14.4 % (ref 11.6–15.1)
GFR SERPL CREATININE-BSD FRML MDRD: 48 ML/MIN/1.73SQ M
GLUCOSE SERPL-MCNC: 228 MG/DL (ref 65–140)
HCT VFR BLD AUTO: 31.7 % (ref 34.8–46.1)
HGB BLD-MCNC: 10.1 G/DL (ref 11.5–15.4)
INR PPP: 2.04 (ref 0.85–1.19)
MCH RBC QN AUTO: 32.7 PG (ref 26.8–34.3)
MCHC RBC AUTO-ENTMCNC: 31.9 G/DL (ref 31.4–37.4)
MCV RBC AUTO: 103 FL (ref 82–98)
PLATELET # BLD AUTO: 214 THOUSANDS/UL (ref 149–390)
PMV BLD AUTO: 12.7 FL (ref 8.9–12.7)
POTASSIUM SERPL-SCNC: 4.2 MMOL/L (ref 3.5–5.3)
PROTHROMBIN TIME: 23.2 SECONDS (ref 12.3–15)
RBC # BLD AUTO: 3.09 MILLION/UL (ref 3.81–5.12)
RHEUMATOID FACT SERPL-ACNC: 74 IU/ML
SODIUM SERPL-SCNC: 140 MMOL/L (ref 135–147)
WBC # BLD AUTO: 12.8 THOUSAND/UL (ref 4.31–10.16)

## 2025-03-19 PROCEDURE — 86235 NUCLEAR ANTIGEN ANTIBODY: CPT | Performed by: PHYSICIAN ASSISTANT

## 2025-03-19 PROCEDURE — 94664 DEMO&/EVAL PT USE INHALER: CPT

## 2025-03-19 PROCEDURE — 85610 PROTHROMBIN TIME: CPT

## 2025-03-19 PROCEDURE — 86038 ANTINUCLEAR ANTIBODIES: CPT | Performed by: PHYSICIAN ASSISTANT

## 2025-03-19 PROCEDURE — 86431 RHEUMATOID FACTOR QUANT: CPT | Performed by: PHYSICIAN ASSISTANT

## 2025-03-19 PROCEDURE — 99239 HOSP IP/OBS DSCHRG MGMT >30: CPT | Performed by: FAMILY MEDICINE

## 2025-03-19 PROCEDURE — 94760 N-INVAS EAR/PLS OXIMETRY 1: CPT

## 2025-03-19 PROCEDURE — 86200 CCP ANTIBODY: CPT | Performed by: PHYSICIAN ASSISTANT

## 2025-03-19 PROCEDURE — 94640 AIRWAY INHALATION TREATMENT: CPT

## 2025-03-19 PROCEDURE — 85027 COMPLETE CBC AUTOMATED: CPT

## 2025-03-19 PROCEDURE — 86225 DNA ANTIBODY NATIVE: CPT | Performed by: PHYSICIAN ASSISTANT

## 2025-03-19 PROCEDURE — 80048 BASIC METABOLIC PNL TOTAL CA: CPT

## 2025-03-19 RX ORDER — DOXYCYCLINE 100 MG/1
100 CAPSULE ORAL EVERY 12 HOURS SCHEDULED
Qty: 5 CAPSULE | Refills: 0 | Status: SHIPPED | OUTPATIENT
Start: 2025-03-19 | End: 2025-03-22

## 2025-03-19 RX ORDER — PREDNISONE 10 MG/1
TABLET ORAL
Qty: 26 TABLET | Refills: 0 | Status: SHIPPED | OUTPATIENT
Start: 2025-03-19 | End: 2025-03-30

## 2025-03-19 RX ORDER — GUAIFENESIN 600 MG/1
600 TABLET, EXTENDED RELEASE ORAL EVERY 12 HOURS SCHEDULED
Qty: 20 TABLET | Refills: 0 | Status: SHIPPED | OUTPATIENT
Start: 2025-03-19

## 2025-03-19 RX ORDER — CARVEDILOL 12.5 MG/1
12.5 TABLET ORAL 2 TIMES DAILY WITH MEALS
Qty: 60 TABLET | Refills: 0 | Status: SHIPPED | OUTPATIENT
Start: 2025-03-19

## 2025-03-19 RX ADMIN — Medication 1000 UNITS: at 08:56

## 2025-03-19 RX ADMIN — IPRATROPIUM BROMIDE AND ALBUTEROL SULFATE 3 ML: 2.5; .5 SOLUTION RESPIRATORY (INHALATION) at 07:50

## 2025-03-19 RX ADMIN — FLUTICASONE FUROATE AND VILANTEROL TRIFENATATE 1 PUFF: 200; 25 POWDER RESPIRATORY (INHALATION) at 08:56

## 2025-03-19 RX ADMIN — PANTOPRAZOLE SODIUM 40 MG: 40 TABLET, DELAYED RELEASE ORAL at 05:19

## 2025-03-19 RX ADMIN — IPRATROPIUM BROMIDE AND ALBUTEROL SULFATE 3 ML: 2.5; .5 SOLUTION RESPIRATORY (INHALATION) at 00:37

## 2025-03-19 RX ADMIN — FOLIC ACID 1 MG: 1 TABLET ORAL at 08:56

## 2025-03-19 RX ADMIN — CARVEDILOL 12.5 MG: 12.5 TABLET, FILM COATED ORAL at 08:56

## 2025-03-19 RX ADMIN — DOXYCYCLINE 100 MG: 100 CAPSULE ORAL at 08:56

## 2025-03-19 RX ADMIN — GABAPENTIN 800 MG: 400 CAPSULE ORAL at 08:56

## 2025-03-19 RX ADMIN — METHYLPREDNISOLONE SODIUM SUCCINATE 40 MG: 40 INJECTION, POWDER, FOR SOLUTION INTRAMUSCULAR; INTRAVENOUS at 08:55

## 2025-03-19 RX ADMIN — GUAIFENESIN 600 MG: 600 TABLET, EXTENDED RELEASE ORAL at 08:56

## 2025-03-19 NOTE — ASSESSMENT & PLAN NOTE
Presents to ED with SOB and cough with hemoptysis   Recently diagnosed three days prior to admission in ED with influenza - ongoing symptoms x 1 week   CT chest:   Suspect post-viral bronchitis   Patient feeling improved lungs are clear she is on room air switch over to prednisone 40 mg daily taper by 10 every 3 days  Doxycycline complete 5 more doses for tracheobronchitis  Duo-nebs  Hemoptysis was secondary to tracheobronchitis now resolved    Appreciate pulmonology input

## 2025-03-19 NOTE — ASSESSMENT & PLAN NOTE
EKG with chronic T wave inversions and right bundle branch block  Likely non MI troponin elevation in the setting of acute influenza and asthma exacerbation  Follow-up on serial troponin - 312-> 277 -> 263  Trending down   Continue beta-blocker and statin  Hold aspirin in the setting of hemoptysis  Follow-up on echocardiogram -LV with mild concentric hypertrophy, LVEF 68% with normal systolic function, no diagnostic regional wall motion abnormality identified.  Diastolic function is mildly abnormal consistent with grade 1 relaxation and left atrial filling pressures elevated  Patient currently denies any chest pain

## 2025-03-19 NOTE — ASSESSMENT & PLAN NOTE
Patient developed KALLI on 3/18 with creatinine 1.38 from 1.06  Likely in setting of dehydration secondary to the flu, other differential included contrast induced nephropathy   Resolved may resume her Lasix and lisinopril in 24 hours

## 2025-03-19 NOTE — ASSESSMENT & PLAN NOTE
Wt Readings from Last 3 Encounters:   03/17/25 85.3 kg (188 lb)   06/22/24 95.3 kg (210 lb)   06/22/24 95.6 kg (210 lb 12.8 oz)     Echo from June 2023 with a normal EF of 58%, no wall motion abnormalities, grade 1 diastolic dysfunction, no significant valvular abnormality.  Denies any weight gain or lower extremity edema  Continue with current outpatient dose of diuretics  Will repeat echo with normal EF

## 2025-03-19 NOTE — PLAN OF CARE
Problem: Potential for Falls  Goal: Patient will remain free of falls  Description: INTERVENTIONS:  - Educate patient/family on patient safety including physical limitations  - Instruct patient to call for assistance with activity   - Consult OT/PT to assist with strengthening/mobility   - Keep Call bell within reach  - Keep bed low and locked with side rails adjusted as appropriate  - Keep care items and personal belongings within reach  - Initiate and maintain comfort rounds  - Make Fall Risk Sign visible to staff  - Offer Toileting every 2 Hours, in advance of need  - Initiate/Maintain bed alarm  - Obtain necessary fall risk management equipment: walker   - Apply yellow socks and bracelet for high fall risk patients  - Consider moving patient to room near nurses station  Outcome: Progressing     Problem: PAIN - ADULT  Goal: Verbalizes/displays adequate comfort level or baseline comfort level  Description: Interventions:  - Encourage patient to monitor pain and request assistance  - Assess pain using appropriate pain scale  - Administer analgesics based on type and severity of pain and evaluate response  - Implement non-pharmacological measures as appropriate and evaluate response  - Consider cultural and social influences on pain and pain management  - Notify physician/advanced practitioner if interventions unsuccessful or patient reports new pain  Outcome: Progressing     Problem: INFECTION - ADULT  Goal: Absence or prevention of progression during hospitalization  Description: INTERVENTIONS:  - Assess and monitor for signs and symptoms of infection  - Monitor lab/diagnostic results  - Monitor all insertion sites, i.e. indwelling lines, tubes, and drains  - Monitor endotracheal if appropriate and nasal secretions for changes in amount and color  - Dike appropriate cooling/warming therapies per order  - Administer medications as ordered  - Instruct and encourage patient and family to use good hand hygiene  technique  - Identify and instruct in appropriate isolation precautions for identified infection/condition  Outcome: Progressing  Goal: Absence of fever/infection during neutropenic period  Description: INTERVENTIONS:  - Monitor WBC    Outcome: Progressing     Problem: SAFETY ADULT  Goal: Patient will remain free of falls  Description: INTERVENTIONS:  - Educate patient/family on patient safety including physical limitations  - Instruct patient to call for assistance with activity   - Consult OT/PT to assist with strengthening/mobility   - Keep Call bell within reach  - Keep bed low and locked with side rails adjusted as appropriate  - Keep care items and personal belongings within reach  - Initiate and maintain comfort rounds  - Make Fall Risk Sign visible to staff  - Offer Toileting every 2 Hours, in advance of need  - Initiate/Maintain bed alarm  - Obtain necessary fall risk management equipment: walker   - Apply yellow socks and bracelet for high fall risk patients  - Consider moving patient to room near nurses station  Outcome: Progressing  Goal: Maintain or return to baseline ADL function  Description: INTERVENTIONS:  -  Assess patient's ability to carry out ADLs; assess patient's baseline for ADL function and identify physical deficits which impact ability to perform ADLs (bathing, care of mouth/teeth, toileting, grooming, dressing, etc.)  - Assess/evaluate cause of self-care deficits   - Assess range of motion  - Assess patient's mobility; develop plan if impaired  - Assess patient's need for assistive devices and provide as appropriate  - Encourage maximum independence but intervene and supervise when necessary  - Involve family in performance of ADLs  - Assess for home care needs following discharge   - Consider OT consult to assist with ADL evaluation and planning for discharge  - Provide patient education as appropriate  Outcome: Progressing  Goal: Maintains/Returns to pre admission functional  level  Description: INTERVENTIONS:  - Perform AM-PAC 6 Click Basic Mobility/ Daily Activity assessment daily.  - Set and communicate daily mobility goal to care team and patient/family/caregiver.   - Collaborate with rehabilitation services on mobility goals if consulted  - Perform Range of Motion 3 times a day.  - Reposition patient every 2 hours.  - Dangle patient 3 times a day  - Stand patient 3 times a day  - Ambulate patient 3 times a day  - Out of bed to chair 3 times a day   - Out of bed for meals 3 times a day  - Out of bed for toileting  - Record patient progress and toleration of activity level   Outcome: Progressing     Problem: DISCHARGE PLANNING  Goal: Discharge to home or other facility with appropriate resources  Description: INTERVENTIONS:  - Identify barriers to discharge w/patient and caregiver  - Arrange for needed discharge resources and transportation as appropriate  - Identify discharge learning needs (meds, wound care, etc.)  - Arrange for interpretive services to assist at discharge as needed  - Refer to Case Management Department for coordinating discharge planning if the patient needs post-hospital services based on physician/advanced practitioner order or complex needs related to functional status, cognitive ability, or social support system  Outcome: Progressing     Problem: Knowledge Deficit  Goal: Patient/family/caregiver demonstrates understanding of disease process, treatment plan, medications, and discharge instructions  Description: Complete learning assessment and assess knowledge base.  Interventions:  - Provide teaching at level of understanding  - Provide teaching via preferred learning methods  Outcome: Progressing     Problem: RESPIRATORY - ADULT  Goal: Achieves optimal ventilation and oxygenation  Description: INTERVENTIONS:  - Assess for changes in respiratory status  - Assess for changes in mentation and behavior  - Position to facilitate oxygenation and minimize  respiratory effort  - Oxygen administered by appropriate delivery if ordered  - Initiate smoking cessation education as indicated  - Encourage broncho-pulmonary hygiene including cough, deep breathe, Incentive Spirometry  - Assess the need for suctioning and aspirate as needed  - Assess and instruct to report SOB or any respiratory difficulty  - Respiratory Therapy support as indicated  Outcome: Progressing

## 2025-03-19 NOTE — TELEPHONE ENCOUNTER
----- Message from Jairon Colunga PA-C sent at 3/18/2025  3:05 PM EDT -----  Hello,     Please schedule Mary Carmen Woo for a HFU within the next 2-4 weeks after tentative discharge date of today    Preferred office location: [facility] to be scheduled with: Hannah    Patient will need none    Patient discharge to Home      Is the patient still admitted? yes    If yes, sender will update D/C paperwork with appointment when return message received.  If no, clerical staff will call patient to schedule.

## 2025-03-19 NOTE — DISCHARGE SUMMARY
Discharge Summary - Hospitalist   Name: Mary Carmen Woo 79 y.o. female I MRN: [id number]  Unit/Bed#: -01 I Date of Admission: 3/16/2025   Date of Service: 3/19/2025 I Hospital Day: 3     Assessment & Plan  Moderate persistent asthma with acute exacerbation  Presents to ED with SOB and cough with hemoptysis   Recently diagnosed three days prior to admission in ED with influenza - ongoing symptoms x 1 week   CT chest:   Suspect post-viral bronchitis   Patient feeling improved lungs are clear she is on room air switch over to prednisone 40 mg daily taper by 10 every 3 days  Doxycycline complete 5 more doses for tracheobronchitis  Duo-nebs  Hemoptysis was secondary to tracheobronchitis now resolved    Appreciate pulmonology input   KALLI (acute kidney injury) (HCC)  Patient developed KALLI on 3/18 with creatinine 1.38 from 1.06  Likely in setting of dehydration secondary to the flu, other differential included contrast induced nephropathy   Resolved may resume her Lasix and lisinopril in 24 hours  Hypertensive urgency  Blood pressure elevated at 232/103 on admission  PTA lisinopril, metoprolol, furosemide  Metoprolol has been changed to coreg for better BP control   Lisinopril, furosemide on hold with developing KALLI may resume in 24 hours KALLI has resolved  Use as needed IV hydralazine for SBP greater than 180  Blood pressure stable  Elevated troponin  EKG with chronic T wave inversions and right bundle branch block  Likely non MI troponin elevation in the setting of acute influenza and asthma exacerbation  Follow-up on serial troponin - 312-> 277 -> 263  Trending down   Continue beta-blocker and statin  Hold aspirin in the setting of hemoptysis  Follow-up on echocardiogram -LV with mild concentric hypertrophy, LVEF 68% with normal systolic function, no diagnostic regional wall motion abnormality identified.  Diastolic function is mildly abnormal consistent with grade 1 relaxation and left atrial filling pressures  elevated  Patient currently denies any chest pain  Chronic heart failure with preserved ejection fraction (HCC)  Wt Readings from Last 3 Encounters:   03/17/25 85.3 kg (188 lb)   06/22/24 95.3 kg (210 lb)   06/22/24 95.6 kg (210 lb 12.8 oz)     Echo from June 2023 with a normal EF of 58%, no wall motion abnormalities, grade 1 diastolic dysfunction, no significant valvular abnormality.  Denies any weight gain or lower extremity edema  Continue with current outpatient dose of diuretics  Will repeat echo with normal EF    History of pulmonary embolism  Maintained on warfarin.  No hemoptysis INR is therapeutic continue Coumadin  LYDIA (obstructive sleep apnea)  CPAP as tolerated - currently declining due to nasal congestion, agreeable to oxygen overnight   Hemoptysis  Suspect secondary to tracheobronchitis resolved  Acute tracheobronchitis  Complete doxycycline for 5 days  Abnormal CT of the chest         Medical Problems       Resolved Problems  Date Reviewed: 3/29/2024   None         MESSAGE TO PCP (Maya Quezada MD) FOR FOLLOW UP:   Thank you for allowing us to participate in the care of your patient, Mary Carmen Woo, who was hospitalized from 3/16/2025 through 03/19/25 with the admitting diagnosis of acute asthma exacerbation tracheobronchitis.      Medication Changes:  Prednisone taper  Doxycycline for 5 more doses  Outpatient testing recommended:  None  If you have any additional questions or would like to discuss further, please feel free to contact me.  Sherrie Rucker MD  [facility] Internal Medicine, Hospitalist, [phone]     Admission Date:   Admission Orders (From admission, onward)       Ordered        03/16/25 1601  INPATIENT ADMISSION  Once                          Discharge Date: 03/19/25    Consultations During Hospital Stay:  Pulmonary    Procedures Performed:   None    Significant Findings / Test Results:   CTA chest PE study-  No pulmonary artery emboli.  2.  Coarsened chronic interstitial lung  "markings predominantly in the lung bases, similar to the prior exam. No new consolidation.  3.  Prominent mediastinal and right hilar nodes may be reactive. Consider 3-month chest CT follow-up to ensure stability/resolution.    Incidental Findings:   See above follow-up with PCP    Test Results Pending at Discharge (will require follow up):   None     Complications: None    Reason for Admission: Asthma exacerbation    Hospital Course:   Mary Carmen Woo is a 79 y.o. female patient who originally presented to the hospital on 3/16/2025 due to asthma exacerbation secondary to tracheobronchitis with recent influenza.  Also had some hemoptysis that resolved suspected secondary to tracheobronchitis CTA is negative for pulmonary emboli pulmonology consulted over the course of the stay patient's shortness of breath is improved she remained on room air she is can to be discharged with prednisone taper starting at 40 mg daily taper by 10 every 3 days she is can to complete doxycycline for 5 more doses total of 5 days completion.  Her KALLI has resolved she may resume Lasix and lisinopril in 24 hours for blood pressure control she was started on Coreg and blood pressure has improved medically cleared to be discharged          Please see above list of diagnoses and related plan for additional information.     Condition at Discharge: stable    Discharge Day Visit / Exam:   Subjective: Patient seen and examined, shortness of breath is improved she just has a dry cough  Vitals: Blood Pressure: 168/83 (03/19/25 0711)  Pulse: 72 (03/19/25 0711)  Temperature: 98.1 °F (36.7 °C) (03/19/25 0711)  Temp Source: Temporal (03/19/25 0711)  Respirations: 20 (03/19/25 0711)  Height: 5' 4\" (162.6 cm) (03/17/25 1045)  Weight - Scale: 85.3 kg (188 lb) (03/17/25 1045)  SpO2: 91 % (03/19/25 0930)  Physical Exam  Vitals and nursing note reviewed.   Constitutional:       General: She is not in acute distress.     Appearance: She is well-developed.   HENT: "      Head: Normocephalic and atraumatic.   Eyes:      Conjunctiva/sclera: Conjunctivae normal.   Cardiovascular:      Rate and Rhythm: Normal rate and regular rhythm.      Heart sounds: No murmur heard.  Pulmonary:      Effort: Pulmonary effort is normal. No respiratory distress.      Breath sounds: Normal breath sounds. No wheezing or rales.   Abdominal:      General: There is no distension.      Palpations: Abdomen is soft.      Tenderness: There is no abdominal tenderness.   Musculoskeletal:         General: No swelling.      Cervical back: Neck supple.   Skin:     General: Skin is warm and dry.      Capillary Refill: Capillary refill takes less than 2 seconds.   Neurological:      Mental Status: She is alert and oriented to person, place, and time.   Psychiatric:         Mood and Affect: Mood normal.          Discussion with Family: pt    Discharge instructions/Information to patient and family:   See after visit summary for information provided to patient and family.      Provisions for Follow-Up Care:  See after visit summary for information related to follow-up care and any pertinent home health orders.      Mobility at time of Discharge:   Basic Mobility Inpatient Raw Score: 23  JH-HLM Goal: 7: Walk 25 feet or more  JH-HLM Achieved: 7: Walk 25 feet or more  HLM Goal achieved. Continue to encourage appropriate mobility.     Disposition:   Home    Planned Readmission: no    Discharge Medications:  See after visit summary for reconciled discharge medications provided to patient and/or family.      Administrative Statements   Discharge Statement:  I have spent a total time of >35 minutes in caring for this patient on the day of the visit/encounter. >30 minutes of time was spent on: Documenting in the medical record and Reviewing / ordering tests, medicine, procedures  .    **Please Note: This note may have been constructed using a voice recognition system**

## 2025-03-19 NOTE — CASE MANAGEMENT
Case Management Discharge Planning Note    Patient name Mary Carmen Woo  Location /-01 MRN [id number]  : 1945 Date 3/19/2025       Current Admission Date: 3/16/2025  Current Admission Diagnosis:Moderate persistent asthma with acute exacerbation   Patient Active Problem List    Diagnosis Date Noted Date Diagnosed    KALLI (acute kidney injury) (HCC) 2025     Hemoptysis 2025     Acute tracheobronchitis 2025     Abnormal CT of the chest 2025     Moderate persistent asthma with acute exacerbation 2025     Hypertensive urgency 2025     Elevated troponin 2025     Chronic heart failure with preserved ejection fraction (HCC) 2025     History of pulmonary embolism 2025     LYDIA (obstructive sleep apnea) 2025       LOS (days): 3  Geometric Mean LOS (GMLOS) (days): 2.9  Days to GMLOS:0     OBJECTIVE:  Risk of Unplanned Readmission Score: 16.62         Current admission status: Inpatient   Preferred Pharmacy:   [facility] [address] [place] [address] [address] [facility]  [address] [facility] [address]  Phone: [phone] Fax: [phone]    Primary Care Provider: Maya Quezada MD    Primary Insurance: [facility]  Secondary Insurance:     DISCHARGE DETAILS:    Discharge planning discussed with:: patient  Freedom of Choice: Yes     CM contacted family/caregiver?: Yes                                      Treatment Team Recommendation: Home  Discharge Destination Plan:: Home  Transport at Discharge : Family                             IMM Given (Date):: 25  IMM Given to:: Patient   CM spoke to patient via phone, reviewed DC IMM with patient and informed that patient can stay an additional 4 hours for reconsidering appealing the discharge as the medicare rights were review on the day of discharge. Pt verbalized understanding and feels ready to go home and does not intend to stay 4 hours to reconsider.

## 2025-03-19 NOTE — ASSESSMENT & PLAN NOTE
Blood pressure elevated at 232/103 on admission  PTA lisinopril, metoprolol, furosemide  Metoprolol has been changed to coreg for better BP control   Lisinopril, furosemide on hold with developing KALLI may resume in 24 hours KALLI has resolved  Use as needed IV hydralazine for SBP greater than 180  Blood pressure stable

## 2025-03-20 NOTE — UTILIZATION REVIEW
NOTIFICATION OF ADMISSION DISCHARGE   This is a Notification of Discharge from [facility]. Please be advised that this patient has been discharge from our facility. Below you will find the admission and discharge date and time including the patient’s disposition.   UTILIZATION REVIEW CONTACT:  Ciara Starr  Utilization   Network Utilization Review Department  Phone: [phone] x carefully listen to the prompts. All voicemails are confidential.  Email: [email or web address]     ADMISSION INFORMATION  PRESENTATION DATE: 3/16/2025  1:21 PM  OBERVATION ADMISSION DATE: N/A  INPATIENT ADMISSION DATE: 3/16/25  4:01 PM   DISCHARGE DATE: 3/19/2025  1:19 PM   DISPOSITION:Home/Self Care    Network Utilization Review Department  ATTENTION: Please call with any questions or concerns to [phone] and carefully listen to the prompts so that you are directed to the right person. All voicemails are confidential.   For Discharge needs, contact Care Management DC Support Team at [phone] opt. 2  Send all requests for admission clinical reviews, approved or denied determinations and any other requests to dedicated fax number below belonging to the campus where the patient is receiving treatment. List of dedicated fax numbers for the Facilities:  FACILITY NAME UR FAX NUMBER   ADMISSION DENIALS (Administrative/Medical Necessity) [phone]   DISCHARGE SUPPORT TEAM ([facility]) [phone]   PARENT CHILD HEALTH (Maternity/NICU/Pediatrics) [phone]   [facility] [phone]   [facility] [phone]   [facility] [phone]   [facility] [phone]   [facility] [phone]   [facility] [phone]   [facility] [phone]   [facility]  [phone]   [facility] [phone]   [facility] [phone]   [facility] [phone]   [facility] [phone]

## 2025-03-21 LAB
BACTERIA BLD CULT: NORMAL
BACTERIA BLD CULT: NORMAL

## 2025-03-23 LAB
CCP AB SER IA-ACNC: 2.1 (ref ?–10)
DSDNA IGG SERPL IA-ACNC: 1.2 IU/ML (ref ?–15)
NUCLEAR IGG SER IA-RTO: 30 RATIO (ref ?–1)

## 2025-03-24 LAB
ANA PAT SER IF-IMP: ABNORMAL
ANA SER QL IF: POSITIVE
ANA TITR SER HEP2 SUBST: ABNORMAL {TITER}
CENTROMERE B AB SER-ACNC: <0.7 U/ML (ref ?–10)
ENA JO1 AB SER IA-ACNC: <0.5 U/ML (ref ?–10)
ENA SCL70 IGG SER IA-ACNC: 0.7 U/ML (ref ?–10)
ENA SM IGG SER IA-ACNC: <0.8 U/ML (ref ?–10)
ENA SS-A AB SER IA-ACNC: >240 U/ML (ref ?–10)
ENA SS-B IGG SER IA-ACNC: 1.2 U/ML (ref ?–10)
U1 SNRNP IGG SER IA-ACNC: 1.5 U/ML (ref ?–10)

## 2025-04-17 PROBLEM — R04.2 HEMOPTYSIS: Status: RESOLVED | Noted: 2025-03-18 | Resolved: 2025-04-17

## 2025-04-17 RX ORDER — ANTIOX #8/OM3/DHA/EPA/LUT/ZEAX 250-2.5 MG
CAPSULE ORAL
COMMUNITY

## 2025-04-17 RX ORDER — BUTALBITAL, ACETAMINOPHEN AND CAFFEINE 50; 325; 40 MG/1; MG/1; MG/1
TABLET ORAL
COMMUNITY
Start: 2025-03-04

## 2025-04-17 RX ORDER — CALCIUM CARBONATE 300MG(750)
TABLET,CHEWABLE ORAL
COMMUNITY

## 2025-04-22 ENCOUNTER — OFFICE VISIT (OUTPATIENT)
Dept: PULMONOLOGY | Facility: CLINIC | Age: 80
End: 2025-04-22
Payer: COMMERCIAL

## 2025-04-22 VITALS
SYSTOLIC BLOOD PRESSURE: 150 MMHG | TEMPERATURE: 97.2 F | OXYGEN SATURATION: 98 % | HEART RATE: 71 BPM | WEIGHT: 204.6 LBS | DIASTOLIC BLOOD PRESSURE: 84 MMHG | BODY MASS INDEX: 34.93 KG/M2 | HEIGHT: 64 IN

## 2025-04-22 DIAGNOSIS — G47.33 OSA (OBSTRUCTIVE SLEEP APNEA): ICD-10-CM

## 2025-04-22 DIAGNOSIS — J84.9 ILD (INTERSTITIAL LUNG DISEASE) (HCC): ICD-10-CM

## 2025-04-22 DIAGNOSIS — R93.89 ABNORMAL CT OF THE CHEST: Primary | ICD-10-CM

## 2025-04-22 DIAGNOSIS — Z86.711 HISTORY OF PULMONARY EMBOLISM: ICD-10-CM

## 2025-04-22 DIAGNOSIS — J45.41 MODERATE PERSISTENT ASTHMA WITH ACUTE EXACERBATION: ICD-10-CM

## 2025-04-22 PROCEDURE — 99214 OFFICE O/P EST MOD 30 MIN: CPT | Performed by: NURSE PRACTITIONER

## 2025-04-22 PROCEDURE — G2211 COMPLEX E/M VISIT ADD ON: HCPCS | Performed by: NURSE PRACTITIONER

## 2025-04-22 RX ORDER — FLUTICASONE PROPIONATE 50 MCG
SPRAY, SUSPENSION (ML) NASAL
COMMUNITY
Start: 2025-04-18

## 2025-04-22 RX ORDER — FLUTICASONE FUROATE, UMECLIDINIUM BROMIDE AND VILANTEROL TRIFENATATE 100; 62.5; 25 UG/1; UG/1; UG/1
1 POWDER RESPIRATORY (INHALATION) DAILY
Qty: 60 BLISTER | Refills: 0 | Status: SHIPPED | COMMUNITY
Start: 2025-04-22 | End: 2025-05-22

## 2025-04-22 RX ORDER — OMEPRAZOLE 40 MG/1
1 CAPSULE, DELAYED RELEASE ORAL DAILY
COMMUNITY
Start: 2025-03-24

## 2025-04-22 NOTE — ASSESSMENT & PLAN NOTE
Coarse interstitial markings with bibasilar predominance on CT chest.   JARRETT +1280, Anti-RO >240 raising concern for Sjogrens and CT-ILD; also has ulnar deviation and possibility of RA    I have recommended rheumatology evaluation for treatment / control and she is asked to schedule.  Orders:    Ambulatory Referral to Rheumatology; Future    Complete PFT with post bronchodilator; Future

## 2025-04-22 NOTE — ASSESSMENT & PLAN NOTE
Recommend CPAP - nightly use  I did not follow compliance for this visit; she has been following with [facility].

## 2025-04-22 NOTE — ASSESSMENT & PLAN NOTE
Historically followed with [facility] for sleep apnea only; moderate persistent asthma diagnosed years ago by PCP. She still has cough and SOB but no further hemoptysis since her hospitalization. She has been on Advair Diskus for some time now with albuterol if needed. I do not have any recent pulmonary function test. Given the concern for ILD I have ordered this test to be performed.  Trial of Trelegy 100 1 inhalation daily (and hold Advair)  Patient will call if Trelegy is effective; if not will return to Advair.  Continue albuterol HFA up to 4x per day    Orders:    Ambulatory Referral to Pulmonology    Complete PFT with post bronchodilator; Future    fluticasone-umeclidinium-vilanterol (Trelegy Ellipta) 100-62.5-25 mcg/actuation inhaler; Inhale 1 puff daily Rinse mouth after use.

## 2025-04-22 NOTE — PROGRESS NOTES
Follow-up  Visit - Pulmonary Medicine   Name: Mary Carmen Woo      : 1945      MRN: [id number]  Encounter Provider: DEIDRE Bauer  Encounter Date: 2025   Encounter department: [facility] [place]  :  Assessment & Plan  Abnormal CT of the chest  Coarse interstitial markings with bibasilar predominance on CT chest.   JARRETT +1280, Anti-RO >240 raising concern for Sjogrens and CT-ILD; also has ulnar deviation and possibility of RA    I have recommended rheumatology evaluation for treatment / control and she is asked to schedule.  Orders:    Ambulatory Referral to Rheumatology; Future    Complete PFT with post bronchodilator; Future    History of pulmonary embolism  Maintained on warfarin         LYDIA (obstructive sleep apnea)  Recommend CPAP - nightly use  I did not follow compliance for this visit; she has been following with [facility].         Moderate persistent asthma with acute exacerbation  Historically followed with [facility] for sleep apnea only; moderate persistent asthma diagnosed years ago by PCP. She still has cough and SOB but no further hemoptysis since her hospitalization. She has been on Advair Diskus for some time now with albuterol if needed. I do not have any recent pulmonary function test. Given the concern for ILD I have ordered this test to be performed.  Trial of Trelegy 100 1 inhalation daily (and hold Advair)  Patient will call if Trelegy is effective; if not will return to Advair.  Continue albuterol HFA up to 4x per day    Orders:    Ambulatory Referral to Pulmonology    Complete PFT with post bronchodilator; Future    fluticasone-umeclidinium-vilanterol (Trelegy Ellipta) 100-62.5-25 mcg/actuation inhaler; Inhale 1 puff daily Rinse mouth after use.    ILD (interstitial lung disease) (HCC)  Concern for CT ILD  Orders:    Ambulatory Referral to Rheumatology; Future    Complete PFT with post bronchodilator;  Future      Return in about 2 months (around 6/22/2025).    History of Present Illness   Mary Carmen Woo is a 79 y.o. female with PMHx CHF, asthma, history of DVT/PE on Coumadin, hypertension, LYDIA on CPAP who presents following recent hospitalization 3/16/25-3/18/25. She was admitted with worsening shortness of breath, cough with hemoptysis, and malaise after being diagnosed with influenza prior to hospitalization. She had been outside the window for Tamiflu at that point.    The long walk from her car to her work station is associated with shortness of breath and she has to take a rest to catch her breath. She is still coughing and producing dark brownish phlegm alternating with clear. She denies wheezing. She has not used her rescue inhaler; she suspects it is outdated. No longer using nebulizer - made her cough worse. The cough is keeping her awake at night. She has an adjustable bed and is sleeping with head elevated each night.    She reports GERD and takes omeprazole daily. Does still have some breakthrough which she feels may be related to drinking soda. She has not eliminated soda.    She has had runny nose for a while and does take Flonase daily - just restarted.    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  Highest level of education is HS graduate.    Tobacco Use: lifelong nonsmoker  Drug use: denies  Alcohol use: denies    Functional status: independent    Review of Systems   Constitutional:  Negative for appetite change and fever.   HENT:  Positive for rhinorrhea. Negative for ear pain, postnasal drip, sneezing, sore throat and trouble swallowing.    Respiratory:  Positive for cough, shortness of breath and wheezing.    Cardiovascular:  Negative for chest pain.   Musculoskeletal:  Positive for myalgias.   Neurological:  Positive for headaches.   All other systems reviewed and are negative.        Current Outpatient Medications on File Prior to Visit    Medication Sig Dispense Refill    aspirin 81 mg chewable tablet Chew 81 mg daily      atorvastatin (LIPITOR) 40 mg tablet Take 40 mg by mouth daily at bedtime      butalbital-acetaminophen-caffeine (FIORICET,ESGIC) -40 mg per tablet TAKE 1 TABLET BY MOUTH EVERY 4 HOURS AS NEEDED FOR HEADACHE      carvedilol (COREG) 12.5 mg tablet Take 1 tablet (12.5 mg total) by mouth 2 (two) times a day with meals 60 tablet 0    Cholecalciferol 25 MCG (1000 UT) capsule Take 1,000 Units by mouth daily      fluticasone (FLONASE) 50 mcg/act nasal spray       Fluticasone-Salmeterol (Advair) 250-50 mcg/dose inhaler INHALE 1 DOSE BY MOUTH TWICE DAILY      folic acid (FOLVITE) 1 mg tablet Take 1 mg by mouth daily      furosemide (LASIX) 40 mg tablet Take 40 mg by mouth daily      gabapentin (NEURONTIN) 800 mg tablet Take 800 mg by mouth 3 (three) times a day      lisinopril (ZESTRIL) 10 mg tablet Take 20 mg by mouth daily      Magnesium 400 MG TABS       montelukast (SINGULAIR) 10 mg tablet Take 10 mg by mouth      Multiple Vitamins-Minerals (PreserVision AREDS 2) CAPS Take by mouth      omeprazole (PriLOSEC) 40 MG capsule Take 1 capsule by mouth in the morning      warfarin (COUMADIN) 5 mg tablet Take 5 mg by mouth daily IN THE EVENING      [DISCONTINUED] Bioflavonoid Products (Vitamin C) CHEW Chew (Patient not taking: Reported on 4/22/2025)      [DISCONTINUED] guaiFENesin (MUCINEX) 600 mg 12 hr tablet Take 1 tablet (600 mg total) by mouth every 12 (twelve) hours (Patient not taking: Reported on 4/22/2025) 20 tablet 0    [DISCONTINUED] omeprazole (PriLOSEC) 20 mg delayed release capsule Take 20 mg by mouth daily       No current facility-administered medications on file prior to visit.      Social History     Tobacco Use    Smoking status: Never     Passive exposure: Never    Smokeless tobacco: Never   Vaping Use    Vaping status: Never Used   Substance and Sexual Activity    Alcohol use: Not Currently    Drug use: Never     "Sexual activity: Not on file        Medical History Reviewed by provider this encounter:  Tobacco  Allergies  Meds  Problems  Med Hx  Surg Hx  Fam Hx     .    Objective   /84 (Patient Position: Sitting, Cuff Size: Standard)   Pulse 71   Temp (!) 97.2 °F (36.2 °C) (Temporal)   Ht 5' 4\" (1.626 m)   Wt 92.8 kg (204 lb 9.6 oz)   SpO2 98%   BMI 35.12 kg/m²     Physical Exam  Vitals reviewed.   Constitutional:       Appearance: Normal appearance.   HENT:      Head: Normocephalic.      Nose: Nose normal.      Mouth/Throat:      Mouth: Mucous membranes are moist.      Pharynx: Oropharynx is clear.   Cardiovascular:      Rate and Rhythm: Normal rate and regular rhythm.      Pulses: Normal pulses.      Heart sounds: Normal heart sounds.   Pulmonary:      Effort: Pulmonary effort is normal.      Breath sounds: Normal breath sounds.   Musculoskeletal:         General: Normal range of motion.   Skin:     General: Skin is warm.      Capillary Refill: Capillary refill takes less than 2 seconds.   Neurological:      General: No focal deficit present.      Mental Status: She is alert and oriented to person, place, and time.   Psychiatric:         Mood and Affect: Mood normal.         Behavior: Behavior normal.         Diagnostic Data:  Labs: I personally reviewed the most recent laboratory data pertinent to today's visit.  Lab Results   Component Value Date    WBC 12.80 (H) 03/19/2025    HGB 10.1 (L) 03/19/2025    HCT 31.7 (L) 03/19/2025     (H) 03/19/2025     03/19/2025    EOSPCT 0 03/17/2025    EOSABS 0.00 03/17/2025    MONOPCT 4 03/17/2025     Lab Results   Component Value Date    CALCIUM 9.4 03/31/2025    K 4.2 03/31/2025    CO2 33 (H) 03/31/2025     (H) 03/19/2025    BUN 22 03/31/2025    CREATININE 0.91 03/31/2025     No results found for: \"IGE\", \"RAST\"  Lab Results   Component Value Date    ALT 9 03/16/2025    AST 21 03/16/2025    ALKPHOS 54 03/16/2025     Expanded CAMPOS Profile  Order: " [id number] - Reflex for Order [id number]      Component  Ref Range & Units (hover) 1 mo ago   Anti-U1RNP Ab 1.5   Comment: Negative: <5  Equivocal: 5-10  Positive:>10   Anti-Sm Ab <0.8   Comment: Negative: <7                        Equivocal: 7-10                      Positive:>10    Heparin can supress SmD antibody reaction. Suggest rejection criteria if collected in Heparin.   Anti-Ro (SS-A) Ab >240.0 High    Comment: Negative: <7                          Equivocal: 7-10                      Positive:>10   Anti-La (SS-B) Ab 1.2   Comment: Negative: <7                        Equivocal: 7-10                      Positive:>10   Anti-Centromere Ab <0.7   Comment: Negative: <7                          Equivocal: 7-10                      Positive:>10   SCL70 0.7   Comment: Negative: <7                        Equivocal: 7-10                      Positive:>10    In rare cases, intereference due to extremely high titers of antibodies to streptavidin can occur.   Anti-Barbara-1 Ab <0.5   Comment: Negative: <7                        Equivocal: 7-10                      Positive:>10            Component  Ref Range & Units (hover) 3/19/25  5:16 AM   Anti Nuclear Antibodies Positive Abnormal    Comment:     JARRETT Titer 1 Titer of 1280   JARRETT Pattern 1 Nucleolar pattern         Component  Ref Range & Units (hover) 3/19/25  5:16 AM   RHEUMATOID FACTOR 74.00 High          Component  Ref Range & Units (hover) 3/19/25  5:16 AM   Cyclic Citrullinated Peptide Ab 2.1         Component  Ref Range & Units (hover) 3/19/25  5:16 AM   CAMPOS Screen 30.00 High    Comment: Negative: <0.7  Equivocal: 0.7-1.0  Positive:>1.0    In rare cases, intereference due to extremely high titers of antibodies to streptavidin can occur.    In the case of a positive result, it is suggested that further confirmatory testing on individual antigen(s) be performed.   Anti-dsDNA Ab 1.2   Comment: Negative: <10                        Equivocal: 10-15                     "  Positive:>15          Radiology results:  Radiology Results Review: I have reviewed radiology reports from pACS including: CT chest.  CTA chest PE study 3/16/25  1.  No pulmonary artery emboli.  2.  Coarsened chronic interstitial lung markings predominantly in the lung bases, similar to the prior exam. No new consolidation.  3.  Prominent mediastinal and right hilar nodes may be reactive. Consider 3-month chest CT follow-up to ensure stability/resolution.        PFT/spirometry results:  No results found for: \"FEV1\", \"FVC\", \"MYT6MQP\", \"TLC\", \"DLCO\"         DEIDRE Bauer      Answers submitted by the patient for this visit:  Pulmonology Questionnaire (Submitted on 4/17/2025)  Chief Complaint: Primary symptoms  Do you have difficulty breathing?: Yes  Chronicity: recurrent  How often do your symptoms occur?: daily  Since you first noticed this problem, how has it changed?: unchanged  Do you have shortness of breath that occurs with effort or exertion?: Yes  Do you have ear congestion?: No  Do you have heartburn?: Yes  Do you have fatigue?: Yes  Do you have nasal congestion?: Yes  Do you have shortness of breath when lying flat?: No  Do you have shortness of breath when you wake up?: No  Do you have sweats?: No  Have you experienced weight loss?: No  Which of the following makes your symptoms worse?: change in weather, climbing stairs, emotional stress, exposure to smoke    "

## 2025-04-23 ENCOUNTER — TELEPHONE (OUTPATIENT)
Age: 80
End: 2025-04-23

## 2025-04-29 ENCOUNTER — HOSPITAL ENCOUNTER (INPATIENT)
Facility: HOSPITAL | Age: 80
LOS: 1 days | Discharge: ANOTHER HOSPITAL | DRG: 871 | End: 2025-04-29
Attending: EMERGENCY MEDICINE | Admitting: INTERNAL MEDICINE
Payer: COMMERCIAL

## 2025-04-29 ENCOUNTER — APPOINTMENT (INPATIENT)
Dept: NON INVASIVE DIAGNOSTICS | Facility: HOSPITAL | Age: 80
DRG: 871 | End: 2025-04-29
Payer: COMMERCIAL

## 2025-04-29 ENCOUNTER — APPOINTMENT (INPATIENT)
Dept: CT IMAGING | Facility: HOSPITAL | Age: 80
DRG: 871 | End: 2025-04-29
Payer: COMMERCIAL

## 2025-04-29 ENCOUNTER — APPOINTMENT (EMERGENCY)
Dept: RADIOLOGY | Facility: HOSPITAL | Age: 80
DRG: 871 | End: 2025-04-29
Payer: COMMERCIAL

## 2025-04-29 VITALS
HEIGHT: 64 IN | OXYGEN SATURATION: 93 % | RESPIRATION RATE: 18 BRPM | TEMPERATURE: 97.5 F | HEART RATE: 86 BPM | SYSTOLIC BLOOD PRESSURE: 146 MMHG | BODY MASS INDEX: 36.37 KG/M2 | DIASTOLIC BLOOD PRESSURE: 68 MMHG | WEIGHT: 213 LBS

## 2025-04-29 DIAGNOSIS — J45.901 ASTHMA WITH ACUTE EXACERBATION, UNSPECIFIED ASTHMA SEVERITY, UNSPECIFIED WHETHER PERSISTENT: ICD-10-CM

## 2025-04-29 DIAGNOSIS — J18.9 PNEUMONIA OF BOTH LUNGS DUE TO INFECTIOUS ORGANISM, UNSPECIFIED PART OF LUNG: ICD-10-CM

## 2025-04-29 DIAGNOSIS — R79.89 ELEVATED TROPONIN: ICD-10-CM

## 2025-04-29 DIAGNOSIS — I50.9 ACUTE ON CHRONIC CONGESTIVE HEART FAILURE, UNSPECIFIED HEART FAILURE TYPE (HCC): ICD-10-CM

## 2025-04-29 DIAGNOSIS — J96.01 ACUTE RESPIRATORY FAILURE WITH HYPOXIA (HCC): Primary | ICD-10-CM

## 2025-04-29 PROBLEM — A41.9 SEPSIS (HCC): Status: ACTIVE | Noted: 2025-04-29

## 2025-04-29 PROBLEM — I50.21 ACUTE HEART FAILURE WITH REDUCED EJECTION FRACTION (HFREF, <= 40%) (HCC): Status: ACTIVE | Noted: 2025-03-16

## 2025-04-29 PROBLEM — D64.9 ANEMIA: Status: ACTIVE | Noted: 2025-04-29

## 2025-04-29 LAB
2HR DELTA HS TROPONIN: 113 NG/L
2HR DELTA HS TROPONIN: 562 NG/L
4HR DELTA HS TROPONIN: 27 NG/L
4HR DELTA HS TROPONIN: 40 NG/L
ALBUMIN SERPL BCG-MCNC: 3.6 G/DL (ref 3.5–5)
ALBUMIN SERPL BCG-MCNC: 3.6 G/DL (ref 3.5–5)
ALP SERPL-CCNC: 66 U/L (ref 34–104)
ALP SERPL-CCNC: 68 U/L (ref 34–104)
ALT SERPL W P-5'-P-CCNC: 10 U/L (ref 7–52)
ALT SERPL W P-5'-P-CCNC: 10 U/L (ref 7–52)
ANION GAP SERPL CALCULATED.3IONS-SCNC: 7 MMOL/L (ref 4–13)
ANION GAP SERPL CALCULATED.3IONS-SCNC: 7 MMOL/L (ref 4–13)
AORTIC ROOT: 3.2 CM
APTT PPP: 130 SECONDS (ref 23–34)
APTT PPP: 30 SECONDS (ref 23–34)
APTT PPP: 31 SECONDS (ref 23–34)
AST SERPL W P-5'-P-CCNC: 22 U/L (ref 13–39)
AST SERPL W P-5'-P-CCNC: 22 U/L (ref 13–39)
ATRIAL RATE: 110 BPM
ATRIAL RATE: 92 BPM
ATRIAL RATE: 95 BPM
BACTERIA UR QL AUTO: ABNORMAL /HPF
BASE EX.OXY STD BLDV CALC-SCNC: 79 % (ref 60–80)
BASE EX.OXY STD BLDV CALC-SCNC: 96 % (ref 60–80)
BASE EXCESS BLDV CALC-SCNC: -5.4 MMOL/L
BASE EXCESS BLDV CALC-SCNC: -5.7 MMOL/L
BASOPHILS # BLD AUTO: 0.04 THOUSANDS/ÂΜL (ref 0–0.1)
BASOPHILS NFR BLD AUTO: 0 % (ref 0–1)
BILIRUB SERPL-MCNC: 0.48 MG/DL (ref 0.2–1)
BILIRUB SERPL-MCNC: 0.55 MG/DL (ref 0.2–1)
BILIRUB UR QL STRIP: NEGATIVE
BNP SERPL-MCNC: 636 PG/ML (ref 0–100)
BSA FOR ECHO PROCEDURE: 2.01 M2
BUN SERPL-MCNC: 38 MG/DL (ref 5–25)
BUN SERPL-MCNC: 40 MG/DL (ref 5–25)
CALCIUM SERPL-MCNC: 8.6 MG/DL (ref 8.4–10.2)
CALCIUM SERPL-MCNC: 8.9 MG/DL (ref 8.4–10.2)
CARDIAC TROPONIN I PNL SERPL HS: 1029 NG/L (ref ?–50)
CARDIAC TROPONIN I PNL SERPL HS: 1391 NG/L (ref ?–50)
CARDIAC TROPONIN I PNL SERPL HS: 1418 NG/L (ref ?–50)
CARDIAC TROPONIN I PNL SERPL HS: 1431 NG/L (ref ?–50)
CARDIAC TROPONIN I PNL SERPL HS: 1504 NG/L (ref ?–50)
CARDIAC TROPONIN I PNL SERPL HS: 467 NG/L (ref ?–50)
CHLORIDE SERPL-SCNC: 113 MMOL/L (ref 96–108)
CHLORIDE SERPL-SCNC: 115 MMOL/L (ref 96–108)
CHOLEST SERPL-MCNC: 90 MG/DL (ref ?–200)
CLARITY UR: CLEAR
CO2 SERPL-SCNC: 21 MMOL/L (ref 21–32)
CO2 SERPL-SCNC: 22 MMOL/L (ref 21–32)
COLOR UR: YELLOW
CREAT SERPL-MCNC: 0.96 MG/DL (ref 0.6–1.3)
CREAT SERPL-MCNC: 0.96 MG/DL (ref 0.6–1.3)
E WAVE DECELERATION TIME: 104 MS
E/A RATIO: 0.81
EOSINOPHIL # BLD AUTO: 0 THOUSAND/ÂΜL (ref 0–0.61)
EOSINOPHIL NFR BLD AUTO: 0 % (ref 0–6)
ERYTHROCYTE [DISTWIDTH] IN BLOOD BY AUTOMATED COUNT: 17 % (ref 11.6–15.1)
ERYTHROCYTE [DISTWIDTH] IN BLOOD BY AUTOMATED COUNT: 17 % (ref 11.6–15.1)
ERYTHROCYTE [DISTWIDTH] IN BLOOD BY AUTOMATED COUNT: 17.4 % (ref 11.6–15.1)
FERRITIN SERPL-MCNC: 1433 NG/ML (ref 30–307)
FLUAV AG UPPER RESP QL IA.RAPID: NEGATIVE
FLUBV AG UPPER RESP QL IA.RAPID: NEGATIVE
FOLATE SERPL-MCNC: >22.3 NG/ML
FRACTIONAL SHORTENING: 25 (ref 28–44)
GFR SERPL CREATININE-BSD FRML MDRD: 57 ML/MIN/1.73SQ M
GFR SERPL CREATININE-BSD FRML MDRD: 57 ML/MIN/1.73SQ M
GLUCOSE SERPL-MCNC: 159 MG/DL (ref 65–140)
GLUCOSE SERPL-MCNC: 177 MG/DL (ref 65–140)
GLUCOSE UR STRIP-MCNC: NEGATIVE MG/DL
HCO3 BLDV-SCNC: 18.8 MMOL/L (ref 24–30)
HCO3 BLDV-SCNC: 19.8 MMOL/L (ref 24–30)
HCT VFR BLD AUTO: 29.1 % (ref 34.8–46.1)
HCT VFR BLD AUTO: 29.2 % (ref 34.8–46.1)
HCT VFR BLD AUTO: 30.1 % (ref 34.8–46.1)
HDLC SERPL-MCNC: 23 MG/DL
HGB BLD-MCNC: 9.1 G/DL (ref 11.5–15.4)
HGB BLD-MCNC: 9.2 G/DL (ref 11.5–15.4)
HGB BLD-MCNC: 9.3 G/DL (ref 11.5–15.4)
HGB UR QL STRIP.AUTO: ABNORMAL
IMM GRANULOCYTES # BLD AUTO: 0.12 THOUSAND/UL (ref 0–0.2)
IMM GRANULOCYTES NFR BLD AUTO: 1 % (ref 0–2)
INR PPP: 2.28 (ref 0.85–1.19)
INR PPP: 2.33 (ref 0.85–1.19)
INR PPP: 2.78 (ref 0.85–1.19)
INTERVENTRICULAR SEPTUM IN DIASTOLE (PARASTERNAL SHORT AXIS VIEW): 1.3 CM
INTERVENTRICULAR SEPTUM: 1.3 CM (ref 0.6–1.1)
IRON SATN MFR SERPL: 7 % (ref 15–50)
IRON SERPL-MCNC: 18 UG/DL (ref 50–212)
KETONES UR STRIP-MCNC: NEGATIVE MG/DL
L PNEUMO1 AG UR QL IA.RAPID: NEGATIVE
LACTATE SERPL-SCNC: 1.9 MMOL/L (ref 0.5–2)
LDLC SERPL CALC-MCNC: 42 MG/DL (ref 0–100)
LEFT ATRIUM SIZE: 4.5 CM
LEFT INTERNAL DIMENSION IN SYSTOLE: 3.8 CM (ref 2.1–4)
LEFT VENTRICLE DIASTOLIC VOLUME (MOD BIPLANE): 66 ML
LEFT VENTRICLE DIASTOLIC VOLUME INDEX (MOD BIPLANE): 32.8 ML/M2
LEFT VENTRICLE SYSTOLIC VOLUME (MOD BIPLANE): 34 ML
LEFT VENTRICLE SYSTOLIC VOLUME INDEX (MOD BIPLANE): 16.9 ML/M2
LEFT VENTRICULAR INTERNAL DIMENSION IN DIASTOLE: 5.1 CM (ref 3.5–6)
LEFT VENTRICULAR POSTERIOR WALL IN END DIASTOLE: 1.4 CM
LEFT VENTRICULAR STROKE VOLUME: 62 ML
LEUKOCYTE ESTERASE UR QL STRIP: NEGATIVE
LIPASE SERPL-CCNC: 14 U/L (ref 11–82)
LV EF BIPLANE MOD: 48 %
LVSV (TEICH): 62 ML
LYMPHOCYTES # BLD AUTO: 1.29 THOUSANDS/ÂΜL (ref 0.6–4.47)
LYMPHOCYTES NFR BLD AUTO: 9 % (ref 14–44)
MAGNESIUM SERPL-MCNC: 2.2 MG/DL (ref 1.9–2.7)
MCH RBC QN AUTO: 32.3 PG (ref 26.8–34.3)
MCH RBC QN AUTO: 32.6 PG (ref 26.8–34.3)
MCH RBC QN AUTO: 33.2 PG (ref 26.8–34.3)
MCHC RBC AUTO-ENTMCNC: 30.6 G/DL (ref 31.4–37.4)
MCHC RBC AUTO-ENTMCNC: 31.3 G/DL (ref 31.4–37.4)
MCHC RBC AUTO-ENTMCNC: 31.8 G/DL (ref 31.4–37.4)
MCV RBC AUTO: 104 FL (ref 82–98)
MCV RBC AUTO: 104 FL (ref 82–98)
MCV RBC AUTO: 106 FL (ref 82–98)
MONOCYTES # BLD AUTO: 0.82 THOUSAND/ÂΜL (ref 0.17–1.22)
MONOCYTES NFR BLD AUTO: 6 % (ref 4–12)
MV E'TISSUE VEL-SEP: 5 CM/S
MV PEAK A VEL: 1.13 M/S
MV PEAK E VEL: 91 CM/S
MV STENOSIS PRESSURE HALF TIME: 30 MS
MV VALVE AREA P 1/2 METHOD: 7.33
NEUTROPHILS # BLD AUTO: 12.35 THOUSANDS/ÂΜL (ref 1.85–7.62)
NEUTS SEG NFR BLD AUTO: 84 % (ref 43–75)
NITRITE UR QL STRIP: NEGATIVE
NON-SQ EPI CELLS URNS QL MICRO: ABNORMAL /HPF
NRBC BLD AUTO-RTO: 0 /100 WBCS
O2 CT BLDV-SCNC: 12.2 ML/DL
O2 CT BLDV-SCNC: 14.1 ML/DL
P AXIS: 30 DEGREES
P AXIS: 43 DEGREES
P AXIS: 53 DEGREES
PCO2 BLDV: 33.2 MM HG (ref 42–50)
PCO2 BLDV: 37.6 MM HG (ref 42–50)
PH BLDV: 7.34 [PH] (ref 7.3–7.4)
PH BLDV: 7.37 [PH] (ref 7.3–7.4)
PH UR STRIP.AUTO: 6 [PH]
PHOSPHATE SERPL-MCNC: 4.2 MG/DL (ref 2.3–4.1)
PLATELET # BLD AUTO: 294 THOUSANDS/UL (ref 149–390)
PLATELET # BLD AUTO: 309 THOUSANDS/UL (ref 149–390)
PLATELET # BLD AUTO: 318 THOUSANDS/UL (ref 149–390)
PMV BLD AUTO: 10.8 FL (ref 8.9–12.7)
PMV BLD AUTO: 10.8 FL (ref 8.9–12.7)
PMV BLD AUTO: 11.3 FL (ref 8.9–12.7)
PO2 BLDV: 108.5 MM HG (ref 35–45)
PO2 BLDV: 47.3 MM HG (ref 35–45)
POTASSIUM SERPL-SCNC: 4.6 MMOL/L (ref 3.5–5.3)
POTASSIUM SERPL-SCNC: 4.6 MMOL/L (ref 3.5–5.3)
PR INTERVAL: 150 MS
PR INTERVAL: 154 MS
PR INTERVAL: 156 MS
PROCALCITONIN SERPL-MCNC: 0.28 NG/ML
PROCALCITONIN SERPL-MCNC: 3.35 NG/ML
PROT SERPL-MCNC: 6.2 G/DL (ref 6.4–8.4)
PROT SERPL-MCNC: 6.4 G/DL (ref 6.4–8.4)
PROT UR STRIP-MCNC: ABNORMAL MG/DL
PROTHROMBIN TIME: 25.2 SECONDS (ref 12.3–15)
PROTHROMBIN TIME: 25.7 SECONDS (ref 12.3–15)
PROTHROMBIN TIME: 29.4 SECONDS (ref 12.3–15)
QRS AXIS: -17 DEGREES
QRS AXIS: -39 DEGREES
QRS AXIS: 28 DEGREES
QRSD INTERVAL: 128 MS
QRSD INTERVAL: 130 MS
QRSD INTERVAL: 130 MS
QT INTERVAL: 352 MS
QT INTERVAL: 390 MS
QT INTERVAL: 390 MS
QTC INTERVAL: 476 MS
QTC INTERVAL: 482 MS
QTC INTERVAL: 490 MS
RA PRESSURE ESTIMATED: 3 MMHG
RBC # BLD AUTO: 2.79 MILLION/UL (ref 3.81–5.12)
RBC # BLD AUTO: 2.8 MILLION/UL (ref 3.81–5.12)
RBC # BLD AUTO: 2.85 MILLION/UL (ref 3.81–5.12)
RBC #/AREA URNS AUTO: ABNORMAL /HPF
RV PSP: 36 MMHG
S PNEUM AG UR QL: NEGATIVE
SARS-COV+SARS-COV-2 AG RESP QL IA.RAPID: NEGATIVE
SL CV LV EF: 40
SL CV PED ECHO LEFT VENTRICLE DIASTOLIC VOLUME (MOD BIPLANE) 2D: 123 ML
SL CV PED ECHO LEFT VENTRICLE SYSTOLIC VOLUME (MOD BIPLANE) 2D: 61 ML
SODIUM SERPL-SCNC: 142 MMOL/L (ref 135–147)
SODIUM SERPL-SCNC: 143 MMOL/L (ref 135–147)
SP GR UR STRIP.AUTO: 1.02 (ref 1–1.03)
T WAVE AXIS: -10 DEGREES
T WAVE AXIS: 1 DEGREES
T WAVE AXIS: 4 DEGREES
TIBC SERPL-MCNC: 270.2 UG/DL (ref 250–450)
TR MAX PG: 33 MMHG
TR PEAK VELOCITY: 2.9 M/S
TRANSFERRIN SERPL-MCNC: 193 MG/DL (ref 203–362)
TRANSFERRIN SERPL-MCNC: 204 MG/DL (ref 203–362)
TRICUSPID VALVE PEAK REGURGITATION VELOCITY: 2.86 M/S
TRIGL SERPL-MCNC: 125 MG/DL (ref ?–150)
TSH SERPL DL<=0.05 MIU/L-ACNC: 2.99 UIU/ML (ref 0.45–4.5)
UIBC SERPL-MCNC: 252 UG/DL (ref 155–355)
UROBILINOGEN UR QL STRIP.AUTO: 0.2 E.U./DL
VENTRICULAR RATE: 110 BPM
VENTRICULAR RATE: 92 BPM
VENTRICULAR RATE: 95 BPM
VIT B12 SERPL-MCNC: 1490 PG/ML (ref 180–914)
WBC # BLD AUTO: 13.25 THOUSAND/UL (ref 4.31–10.16)
WBC # BLD AUTO: 14.62 THOUSAND/UL (ref 4.31–10.16)
WBC # BLD AUTO: 15.64 THOUSAND/UL (ref 4.31–10.16)
WBC #/AREA URNS AUTO: ABNORMAL /HPF

## 2025-04-29 PROCEDURE — 85730 THROMBOPLASTIN TIME PARTIAL: CPT | Performed by: FAMILY MEDICINE

## 2025-04-29 PROCEDURE — 84484 ASSAY OF TROPONIN QUANT: CPT | Performed by: EMERGENCY MEDICINE

## 2025-04-29 PROCEDURE — 87449 NOS EACH ORGANISM AG IA: CPT

## 2025-04-29 PROCEDURE — 82805 BLOOD GASES W/O2 SATURATION: CPT

## 2025-04-29 PROCEDURE — 87040 BLOOD CULTURE FOR BACTERIA: CPT | Performed by: EMERGENCY MEDICINE

## 2025-04-29 PROCEDURE — C8929 TTE W OR WO FOL WCON,DOPPLER: HCPCS

## 2025-04-29 PROCEDURE — 99285 EMERGENCY DEPT VISIT HI MDM: CPT

## 2025-04-29 PROCEDURE — 82607 VITAMIN B-12: CPT

## 2025-04-29 PROCEDURE — 85027 COMPLETE CBC AUTOMATED: CPT

## 2025-04-29 PROCEDURE — 85610 PROTHROMBIN TIME: CPT | Performed by: FAMILY MEDICINE

## 2025-04-29 PROCEDURE — 84466 ASSAY OF TRANSFERRIN: CPT

## 2025-04-29 PROCEDURE — 85610 PROTHROMBIN TIME: CPT

## 2025-04-29 PROCEDURE — 80061 LIPID PANEL: CPT

## 2025-04-29 PROCEDURE — 85730 THROMBOPLASTIN TIME PARTIAL: CPT | Performed by: INTERNAL MEDICINE

## 2025-04-29 PROCEDURE — 84484 ASSAY OF TROPONIN QUANT: CPT

## 2025-04-29 PROCEDURE — 87804 INFLUENZA ASSAY W/OPTIC: CPT | Performed by: EMERGENCY MEDICINE

## 2025-04-29 PROCEDURE — 36415 COLL VENOUS BLD VENIPUNCTURE: CPT | Performed by: EMERGENCY MEDICINE

## 2025-04-29 PROCEDURE — 80053 COMPREHEN METABOLIC PANEL: CPT

## 2025-04-29 PROCEDURE — 84100 ASSAY OF PHOSPHORUS: CPT

## 2025-04-29 PROCEDURE — 83540 ASSAY OF IRON: CPT

## 2025-04-29 PROCEDURE — 83880 ASSAY OF NATRIURETIC PEPTIDE: CPT | Performed by: EMERGENCY MEDICINE

## 2025-04-29 PROCEDURE — 96361 HYDRATE IV INFUSION ADD-ON: CPT

## 2025-04-29 PROCEDURE — 96367 TX/PROPH/DG ADDL SEQ IV INF: CPT

## 2025-04-29 PROCEDURE — 94760 N-INVAS EAR/PLS OXIMETRY 1: CPT

## 2025-04-29 PROCEDURE — 82728 ASSAY OF FERRITIN: CPT

## 2025-04-29 PROCEDURE — 84145 PROCALCITONIN (PCT): CPT

## 2025-04-29 PROCEDURE — 83735 ASSAY OF MAGNESIUM: CPT | Performed by: EMERGENCY MEDICINE

## 2025-04-29 PROCEDURE — 94640 AIRWAY INHALATION TREATMENT: CPT

## 2025-04-29 PROCEDURE — 82805 BLOOD GASES W/O2 SATURATION: CPT | Performed by: EMERGENCY MEDICINE

## 2025-04-29 PROCEDURE — 87811 SARS-COV-2 COVID19 W/OPTIC: CPT | Performed by: EMERGENCY MEDICINE

## 2025-04-29 PROCEDURE — 85730 THROMBOPLASTIN TIME PARTIAL: CPT | Performed by: EMERGENCY MEDICINE

## 2025-04-29 PROCEDURE — 71045 X-RAY EXAM CHEST 1 VIEW: CPT

## 2025-04-29 PROCEDURE — 85025 COMPLETE CBC W/AUTO DIFF WBC: CPT | Performed by: EMERGENCY MEDICINE

## 2025-04-29 PROCEDURE — 83690 ASSAY OF LIPASE: CPT | Performed by: EMERGENCY MEDICINE

## 2025-04-29 PROCEDURE — 94002 VENT MGMT INPAT INIT DAY: CPT

## 2025-04-29 PROCEDURE — 81001 URINALYSIS AUTO W/SCOPE: CPT

## 2025-04-29 PROCEDURE — 80053 COMPREHEN METABOLIC PANEL: CPT | Performed by: EMERGENCY MEDICINE

## 2025-04-29 PROCEDURE — 85610 PROTHROMBIN TIME: CPT | Performed by: EMERGENCY MEDICINE

## 2025-04-29 PROCEDURE — 99222 1ST HOSP IP/OBS MODERATE 55: CPT

## 2025-04-29 PROCEDURE — 82746 ASSAY OF FOLIC ACID SERUM: CPT

## 2025-04-29 PROCEDURE — 83550 IRON BINDING TEST: CPT

## 2025-04-29 PROCEDURE — 87081 CULTURE SCREEN ONLY: CPT

## 2025-04-29 PROCEDURE — NC001 PR NO CHARGE: Performed by: FAMILY MEDICINE

## 2025-04-29 PROCEDURE — 93005 ELECTROCARDIOGRAM TRACING: CPT

## 2025-04-29 PROCEDURE — 85027 COMPLETE CBC AUTOMATED: CPT | Performed by: FAMILY MEDICINE

## 2025-04-29 PROCEDURE — 84145 PROCALCITONIN (PCT): CPT | Performed by: EMERGENCY MEDICINE

## 2025-04-29 PROCEDURE — 96375 TX/PRO/DX INJ NEW DRUG ADDON: CPT

## 2025-04-29 PROCEDURE — 84443 ASSAY THYROID STIM HORMONE: CPT

## 2025-04-29 PROCEDURE — 96365 THER/PROPH/DIAG IV INF INIT: CPT

## 2025-04-29 PROCEDURE — 83605 ASSAY OF LACTIC ACID: CPT | Performed by: EMERGENCY MEDICINE

## 2025-04-29 PROCEDURE — 71250 CT THORAX DX C-: CPT

## 2025-04-29 RX ORDER — WARFARIN SODIUM 5 MG/1
5 TABLET ORAL
Status: DISCONTINUED | OUTPATIENT
Start: 2025-04-29 | End: 2025-04-29

## 2025-04-29 RX ORDER — LANOLIN ALCOHOL/MO/W.PET/CERES
400 CREAM (GRAM) TOPICAL DAILY
Status: DISCONTINUED | OUTPATIENT
Start: 2025-04-29 | End: 2025-04-30 | Stop reason: HOSPADM

## 2025-04-29 RX ORDER — PANTOPRAZOLE SODIUM 40 MG/1
40 TABLET, DELAYED RELEASE ORAL
Status: DISCONTINUED | OUTPATIENT
Start: 2025-04-29 | End: 2025-04-30 | Stop reason: HOSPADM

## 2025-04-29 RX ORDER — SODIUM CHLORIDE FOR INHALATION 0.9 %
3 VIAL, NEBULIZER (ML) INHALATION
Status: DISCONTINUED | OUTPATIENT
Start: 2025-04-29 | End: 2025-04-29

## 2025-04-29 RX ORDER — IPRATROPIUM BROMIDE AND ALBUTEROL SULFATE .5; 3 MG/3ML; MG/3ML
2 SOLUTION RESPIRATORY (INHALATION) ONCE
Status: COMPLETED | OUTPATIENT
Start: 2025-04-29 | End: 2025-04-29

## 2025-04-29 RX ORDER — CEFTRIAXONE 2 G/50ML
2000 INJECTION, SOLUTION INTRAVENOUS EVERY 24 HOURS
Start: 2025-04-30 | End: 2025-05-07

## 2025-04-29 RX ORDER — GABAPENTIN 400 MG/1
800 CAPSULE ORAL 3 TIMES DAILY
Status: DISCONTINUED | OUTPATIENT
Start: 2025-04-29 | End: 2025-04-30 | Stop reason: HOSPADM

## 2025-04-29 RX ORDER — FUROSEMIDE 10 MG/ML
40 INJECTION INTRAMUSCULAR; INTRAVENOUS ONCE
Status: COMPLETED | OUTPATIENT
Start: 2025-04-29 | End: 2025-04-29

## 2025-04-29 RX ORDER — ASPIRIN 81 MG/1
81 TABLET, CHEWABLE ORAL DAILY
Status: DISCONTINUED | OUTPATIENT
Start: 2025-04-29 | End: 2025-04-30 | Stop reason: HOSPADM

## 2025-04-29 RX ORDER — FUROSEMIDE 10 MG/ML
50 INJECTION INTRAMUSCULAR; INTRAVENOUS 2 TIMES DAILY
Status: DISCONTINUED | OUTPATIENT
Start: 2025-04-29 | End: 2025-04-30 | Stop reason: HOSPADM

## 2025-04-29 RX ORDER — BENZONATATE 100 MG/1
100 CAPSULE ORAL 3 TIMES DAILY PRN
Status: DISCONTINUED | OUTPATIENT
Start: 2025-04-29 | End: 2025-04-30 | Stop reason: HOSPADM

## 2025-04-29 RX ORDER — HEPARIN SODIUM 10000 [USP'U]/100ML
3-20 INJECTION, SOLUTION INTRAVENOUS
Status: DISCONTINUED | OUTPATIENT
Start: 2025-04-29 | End: 2025-04-30 | Stop reason: HOSPADM

## 2025-04-29 RX ORDER — FLUTICASONE PROPIONATE 50 MCG
1 SPRAY, SUSPENSION (ML) NASAL DAILY
Status: DISCONTINUED | OUTPATIENT
Start: 2025-04-29 | End: 2025-04-30 | Stop reason: HOSPADM

## 2025-04-29 RX ORDER — CEFTRIAXONE 2 G/50ML
2000 INJECTION, SOLUTION INTRAVENOUS ONCE
Status: COMPLETED | OUTPATIENT
Start: 2025-04-29 | End: 2025-04-29

## 2025-04-29 RX ORDER — LISINOPRIL 20 MG/1
40 TABLET ORAL DAILY
Status: DISCONTINUED | OUTPATIENT
Start: 2025-04-29 | End: 2025-04-30 | Stop reason: HOSPADM

## 2025-04-29 RX ORDER — HEPARIN SODIUM 1000 [USP'U]/ML
4000 INJECTION, SOLUTION INTRAVENOUS; SUBCUTANEOUS ONCE
Status: COMPLETED | OUTPATIENT
Start: 2025-04-29 | End: 2025-04-29

## 2025-04-29 RX ORDER — CARVEDILOL 12.5 MG/1
12.5 TABLET ORAL 2 TIMES DAILY WITH MEALS
Status: DISCONTINUED | OUTPATIENT
Start: 2025-04-29 | End: 2025-04-30 | Stop reason: HOSPADM

## 2025-04-29 RX ORDER — HEPARIN SODIUM 1000 [USP'U]/ML
4000 INJECTION, SOLUTION INTRAVENOUS; SUBCUTANEOUS EVERY 6 HOURS PRN
Status: DISCONTINUED | OUTPATIENT
Start: 2025-04-29 | End: 2025-04-30 | Stop reason: HOSPADM

## 2025-04-29 RX ORDER — ATORVASTATIN CALCIUM 40 MG/1
40 TABLET, FILM COATED ORAL
Status: DISCONTINUED | OUTPATIENT
Start: 2025-04-29 | End: 2025-04-30 | Stop reason: HOSPADM

## 2025-04-29 RX ORDER — HEPARIN SODIUM 10000 [USP'U]/100ML
3-20 INJECTION, SOLUTION INTRAVENOUS
Start: 2025-04-29

## 2025-04-29 RX ORDER — METHYLPREDNISOLONE SODIUM SUCCINATE 40 MG/ML
40 INJECTION, POWDER, LYOPHILIZED, FOR SOLUTION INTRAMUSCULAR; INTRAVENOUS EVERY 6 HOURS
Status: DISCONTINUED | OUTPATIENT
Start: 2025-04-29 | End: 2025-04-29

## 2025-04-29 RX ORDER — IPRATROPIUM BROMIDE AND ALBUTEROL SULFATE 2.5; .5 MG/3ML; MG/3ML
3 SOLUTION RESPIRATORY (INHALATION) ONCE
Status: COMPLETED | OUTPATIENT
Start: 2025-04-29 | End: 2025-04-29

## 2025-04-29 RX ORDER — HEPARIN SODIUM 1000 [USP'U]/ML
2000 INJECTION, SOLUTION INTRAVENOUS; SUBCUTANEOUS EVERY 6 HOURS PRN
Status: DISCONTINUED | OUTPATIENT
Start: 2025-04-29 | End: 2025-04-30 | Stop reason: HOSPADM

## 2025-04-29 RX ORDER — FUROSEMIDE 10 MG/ML
50 INJECTION INTRAMUSCULAR; INTRAVENOUS 2 TIMES DAILY
Start: 2025-04-29

## 2025-04-29 RX ORDER — MONTELUKAST SODIUM 10 MG/1
10 TABLET ORAL
Status: DISCONTINUED | OUTPATIENT
Start: 2025-04-29 | End: 2025-04-30 | Stop reason: HOSPADM

## 2025-04-29 RX ORDER — FLUTICASONE FUROATE AND VILANTEROL 200; 25 UG/1; UG/1
1 POWDER RESPIRATORY (INHALATION)
Status: DISCONTINUED | OUTPATIENT
Start: 2025-04-29 | End: 2025-04-30 | Stop reason: HOSPADM

## 2025-04-29 RX ORDER — ACETAMINOPHEN 325 MG/1
650 TABLET ORAL EVERY 6 HOURS PRN
Status: DISCONTINUED | OUTPATIENT
Start: 2025-04-29 | End: 2025-04-30 | Stop reason: HOSPADM

## 2025-04-29 RX ORDER — MAGNESIUM SULFATE 1 G/100ML
1 INJECTION INTRAVENOUS ONCE
Status: COMPLETED | OUTPATIENT
Start: 2025-04-29 | End: 2025-04-29

## 2025-04-29 RX ORDER — METHYLPREDNISOLONE SOD SUCC 125 MG
1 VIAL (EA) INJECTION ONCE
Status: COMPLETED | OUTPATIENT
Start: 2025-04-29 | End: 2025-04-29

## 2025-04-29 RX ORDER — BUTALBITAL, ACETAMINOPHEN AND CAFFEINE 50; 325; 40 MG/1; MG/1; MG/1
1 TABLET ORAL EVERY 4 HOURS PRN
Status: DISCONTINUED | OUTPATIENT
Start: 2025-04-29 | End: 2025-04-30 | Stop reason: HOSPADM

## 2025-04-29 RX ORDER — LEVALBUTEROL INHALATION SOLUTION 1.25 MG/3ML
1.25 SOLUTION RESPIRATORY (INHALATION)
Status: DISCONTINUED | OUTPATIENT
Start: 2025-04-29 | End: 2025-04-30 | Stop reason: HOSPADM

## 2025-04-29 RX ORDER — CEFTRIAXONE 2 G/50ML
2000 INJECTION, SOLUTION INTRAVENOUS EVERY 24 HOURS
Status: DISCONTINUED | OUTPATIENT
Start: 2025-04-30 | End: 2025-04-30 | Stop reason: HOSPADM

## 2025-04-29 RX ORDER — CALCIUM CARBONATE 500 MG/1
1000 TABLET, CHEWABLE ORAL DAILY PRN
Status: DISCONTINUED | OUTPATIENT
Start: 2025-04-29 | End: 2025-04-30 | Stop reason: HOSPADM

## 2025-04-29 RX ADMIN — FUROSEMIDE 50 MG: 10 INJECTION, SOLUTION INTRAMUSCULAR; INTRAVENOUS at 07:52

## 2025-04-29 RX ADMIN — IPRATROPIUM BROMIDE 0.5 MG: 0.5 SOLUTION RESPIRATORY (INHALATION) at 20:16

## 2025-04-29 RX ADMIN — SODIUM CHLORIDE 250 ML: 0.9 INJECTION, SOLUTION INTRAVENOUS at 06:00

## 2025-04-29 RX ADMIN — Medication 400 MG: at 08:00

## 2025-04-29 RX ADMIN — CEFTRIAXONE 2000 MG: 2 INJECTION, SOLUTION INTRAVENOUS at 01:57

## 2025-04-29 RX ADMIN — GABAPENTIN 800 MG: 400 CAPSULE ORAL at 15:46

## 2025-04-29 RX ADMIN — HEPARIN SODIUM 11.1 UNITS/KG/HR: 10000 INJECTION, SOLUTION INTRAVENOUS at 10:11

## 2025-04-29 RX ADMIN — Medication 1000 UNITS: at 08:00

## 2025-04-29 RX ADMIN — LISINOPRIL 40 MG: 20 TABLET ORAL at 08:00

## 2025-04-29 RX ADMIN — LEVALBUTEROL HYDROCHLORIDE 1.25 MG: 1.25 SOLUTION RESPIRATORY (INHALATION) at 07:45

## 2025-04-29 RX ADMIN — CARVEDILOL 12.5 MG: 12.5 TABLET, FILM COATED ORAL at 15:46

## 2025-04-29 RX ADMIN — FUROSEMIDE 50 MG: 10 INJECTION, SOLUTION INTRAMUSCULAR; INTRAVENOUS at 17:05

## 2025-04-29 RX ADMIN — PERFLUTREN 3 ML/MIN: 6.52 INJECTION, SUSPENSION INTRAVENOUS at 08:01

## 2025-04-29 RX ADMIN — CARVEDILOL 12.5 MG: 12.5 TABLET, FILM COATED ORAL at 08:00

## 2025-04-29 RX ADMIN — LEVALBUTEROL HYDROCHLORIDE 1.25 MG: 1.25 SOLUTION RESPIRATORY (INHALATION) at 14:30

## 2025-04-29 RX ADMIN — GABAPENTIN 800 MG: 400 CAPSULE ORAL at 08:00

## 2025-04-29 RX ADMIN — IPRATROPIUM BROMIDE AND ALBUTEROL SULFATE 3 ML: 2.5; .5 SOLUTION RESPIRATORY (INHALATION) at 01:23

## 2025-04-29 RX ADMIN — ATORVASTATIN CALCIUM 40 MG: 40 TABLET, FILM COATED ORAL at 15:46

## 2025-04-29 RX ADMIN — GABAPENTIN 800 MG: 400 CAPSULE ORAL at 21:34

## 2025-04-29 RX ADMIN — MAGNESIUM SULFATE HEPTAHYDRATE 1 G: 1 INJECTION, SOLUTION INTRAVENOUS at 01:58

## 2025-04-29 RX ADMIN — MONTELUKAST 10 MG: 10 TABLET, FILM COATED ORAL at 21:34

## 2025-04-29 RX ADMIN — METHYLPREDNISOLONE SODIUM SUCCINATE 40 MG: 40 INJECTION, POWDER, FOR SOLUTION INTRAMUSCULAR; INTRAVENOUS at 06:34

## 2025-04-29 RX ADMIN — BUTALBITAL, ACETAMINOPHEN, AND CAFFEINE 1 TABLET: 50; 325; 40 TABLET, COATED ORAL at 13:00

## 2025-04-29 RX ADMIN — ASPIRIN 81 MG: 81 TABLET, CHEWABLE ORAL at 08:00

## 2025-04-29 RX ADMIN — HEPARIN SODIUM 4000 UNITS: 1000 INJECTION, SOLUTION INTRAVENOUS; SUBCUTANEOUS at 10:10

## 2025-04-29 RX ADMIN — IPRATROPIUM BROMIDE 0.5 MG: 0.5 SOLUTION RESPIRATORY (INHALATION) at 14:30

## 2025-04-29 RX ADMIN — ACETAMINOPHEN 650 MG: 325 TABLET ORAL at 09:35

## 2025-04-29 RX ADMIN — IPRATROPIUM BROMIDE 0.5 MG: 0.5 SOLUTION RESPIRATORY (INHALATION) at 07:45

## 2025-04-29 RX ADMIN — AZITHROMYCIN MONOHYDRATE 500 MG: 500 INJECTION, POWDER, LYOPHILIZED, FOR SOLUTION INTRAVENOUS at 03:36

## 2025-04-29 RX ADMIN — FLUTICASONE PROPIONATE 1 SPRAY: 50 SPRAY, METERED NASAL at 08:13

## 2025-04-29 RX ADMIN — LEVALBUTEROL HYDROCHLORIDE 1.25 MG: 1.25 SOLUTION RESPIRATORY (INHALATION) at 20:16

## 2025-04-29 RX ADMIN — PANTOPRAZOLE SODIUM 40 MG: 40 TABLET, DELAYED RELEASE ORAL at 06:34

## 2025-04-29 RX ADMIN — FUROSEMIDE 40 MG: 10 INJECTION, SOLUTION INTRAMUSCULAR; INTRAVENOUS at 02:06

## 2025-04-29 RX ADMIN — FLUTICASONE FUROATE AND VILANTEROL TRIFENATATE 1 PUFF: 200; 25 POWDER RESPIRATORY (INHALATION) at 08:11

## 2025-04-29 RX ADMIN — SODIUM CHLORIDE 500 ML: 0.9 INJECTION, SOLUTION INTRAVENOUS at 01:22

## 2025-04-29 NOTE — ASSESSMENT & PLAN NOTE
Noted history of LYDIA  Was recommended CPAP nightly use but did not follow compliance  Start CPAP at night  Follows outpatient with [facility]

## 2025-04-29 NOTE — ASSESSMENT & PLAN NOTE
Lab Results   Component Value Date    HGB 9.1 (L) 04/29/2025    HGB 9.3 (L) 04/29/2025    HGB 9.2 (L) 04/29/2025    HCT 29.1 (L) 04/29/2025    HCT 29.2 (L) 04/29/2025    HCT 30.1 (L) 04/29/2025   Hemoglobin on admission 9.2  Iron panel pending  Trend CBC

## 2025-04-29 NOTE — DISCHARGE SUMMARY
"Discharge Summary - Hospitalist   Name: Mary Carmen Woo 79 y.o. female I MRN: [id number]  Unit/Bed#: -01 I Date of Admission: 4/29/2025   Date of Service: 4/29/2025 I Hospital Day: 0     Assessment & Plan  Sepsis (HCC)  Met SIRS criteria on presentation (4/29) with , RR 25, WBC at 14.62  Chest x-ray: \"Enlarged cardiac silhouette with prominence of the pulmonary vascular and interstitial markings and patchy opacities as well as suspected right-sided pleural effusion, as described. Findings taken together suspicious for a component of fluid overload/CHF. Superimposed infection considered in the appropriate clinical setting. Correlation with the patient's symptoms and laboratory values recommended.\"  CT chest   Small bilateral pleural effusions, right greater than left and bilateral multi lobar patchy consolidation and groundglass opacities may represent pulmonary vascular congestion. Correlate with clinical findings to exclude superimposed pneumonia.   Groundglass opacities more likely to reflect underlying pulmonary edema than infectious infiltrates however given presentation with SIRS-treat empirically  COVID/flu negative  No fevers, lactic acid normal at 1.9  Blood cultures, urine strep pneumonia, urine Legionella, sputum culture, and MRSA pending  Received Rocephin 2000 mg IV in the ED, continue  Moderate persistent asthma with acute exacerbation  As stated, patient presented this morning (4/29) with respiratory distress  Ultimately, thought the result of acute pulmonary edema in the setting of new diagnosis of HFmrEF and possible cardiac equivalent with underlying coronary disease  History of asthma/COPD-initial index suspicion for perhaps some degree of exacerbation-was administered intravenous steroids in the ED (4/29)  At this juncture, going to discontinue the steroids-asthma does not appear to be exacerbated  Continue aerosols and nebulizers upon transfer  Acute respiratory failure with hypoxia " ([facility])  Patient presented early this morning (4/29) with persistent/progressive shortness of breath  Patient had been experiencing shortness of breath with exertion and then at rest over the past several weeks  In the day prior to presentation (4/28) patient with respiratory distress prompting presentation  Patient also with subjective intermittent chest discomfort associated with activity    In ED, patient with increased work of breathing and hypoxia requiring transient BiPAP therapy  Evidence on plain film and CT imaging of pulmonary edema and mild pleural effusions raising concern over exacerbation of underlying diastolic heart failure (based on previous echocardiogram)  Patient received intravenous diuretic therapy in the ED and was liberated from BiPAP therapy to nasal cannula-2 L    This morning (4/29) patient underwent priority echocardiogram which revealed mildly reduced ejection fraction of 40% which is new compared to previous  Furthermore, patient with significant elevation of cardiac enzymes raising concern over underlying ischemic disease  Patient was evaluated by cardiology in an urgent fashion in consultation with recommendations for transfer for possible ischemic workup    Patient was administered aspirin and initiated on heparin infusion given index suspicion for underlying coronary disease    Case was discussed with [facility] cardiology (Dr. Lopez) who agrees with possible need of ischemic workup and plan as outlined-recommended admission to the hospitalist service at [facility]  Subsequently, I spoke with the hospitalist Dr. Ji who gladly accepted the patient to intermediate care    The patient is hemodynamically stable and chest pain-free at the time of transfer  She will transfer via ALS    Hypertensive urgency  BP in the /111, current /77  Home medication: Coreg, Lasix, lisinopril    Likely, reflecting some degree of underlying volume overload given  presentation  Patient with new HFmrEF with index suspicion for coronary artery disease  Systolic blood pressure improved significantly with volume unloading  Monitor SBP curve and determine appropriateness of addition of medication regimen  May require initiation of GDMT for underlying coronary disease-further workup pending transfer  Elevated troponin  Troponin: 467 -> 1029  Patient high risk CANELO-elevated heart score-echocardiogram performed this morning (4/29) reveals decreased ejection fraction with wall motion abnormality new from previous with high index of suspicion for underlying coronary disease  As stated, reviewed case with cardiology who saw the patient in consultation in an urgent manner-recommendation for transfer to tertiary care center for ischemic workup-patient accepted at [facility]  Received aspirin in the ED-heparin drip initiated-beta-blockers and oxygen  Acute heart failure with reduced ejection fraction (HFrEF, <= 40%) ([facility])  Wt Readings from Last 3 Encounters:   04/29/25 96.6 kg (213 lb)   04/22/25 92.8 kg (204 lb 9.6 oz)   03/17/25 85.3 kg (188 lb)   Noted 25 pound weight gain from previous weight in March 2025.  Patient reports med compliance and adherence to low sodium diet at home.  As stated, underwent priority echocardiogram this morning with results as follows:   Left Ventricle: Left ventricular cavity size is normal. Wall thickness is mildly increased. The left ventricular ejection fraction is 40% by visual estimation. Systolic function is mildly reduced. Diastolic function is mildly abnormal, consistent with grade I (abnormal) relaxation.    The following segments are akinetic: basal inferoseptal, mid inferoseptal and apical septal.    The following segments are hypokinetic: basal anteroseptal, basal inferior, basal inferolateral, basal anterolateral, mid anteroseptal, mid inferior, mid inferolateral, mid anterolateral, apical inferior and apical lateral.    All  other segments are normal.  Patient received intravenous diuretic therapy in the ED this morning with adequate volume unloading and prompt resolution of respiratory symptoms  However, new heart failure concerning over underlying coronary disease and coordination with elevated cardiac enzymes (see above)  Will require initiation of GDMT  Anemia  Lab Results   Component Value Date    HGB 9.1 (L) 04/29/2025    HGB 9.3 (L) 04/29/2025    HGB 9.2 (L) 04/29/2025    HCT 29.1 (L) 04/29/2025    HCT 29.2 (L) 04/29/2025    HCT 30.1 (L) 04/29/2025   Hemoglobin on admission 9.2  Iron panel pending  Trend CBC  LYDIA (obstructive sleep apnea)  Noted history of LYDIA  Was recommended CPAP nightly use but did not follow compliance  Start CPAP at night  Follows outpatient with [facility]     Medical Problems       Resolved Problems  Date Reviewed: 4/22/2025   None       Admission Date:   Admission Orders (From admission, onward)       Ordered        04/29/25 0459  INPATIENT ADMISSION  Once                          Discharge Date: 04/29/25    Consultations During Hospital Stay:  Cardiology    Procedures Performed:   Echocardiogram    Significant Findings / Test Results:   HFrEF-40%  Elevated cardiac enzymes    Incidental Findings:   None    Reason for Admission: Dyspnea    Hospital Course:   Mary Carmen Woo is a 79 y.o. female patient who originally presented to the hospital on 4/29/2025 due to persistent/progressive dyspnea/respiratory distress.  Initially, in respiratory distress with tachycardia and hypoxia.  Transiently required BiPAP.  Evidence of pulmonary edema and received IV diuretics.  Underwent priority echocardiogram which revealed reduced ejection fraction from previous.  Furthermore, elevation of cardiac enzymes raising concern for underlying coronary disease.  Received aspirin and was initiated on heparin infusion.  Evaluated by in-house cardiology with recommendations for transfer for possible ischemic workup.   "Case discussed with [facility] cardiology and accepted in transfer by hospitalist service to [facility].  Patient is hemodynamically stable and chest pain-free at the time of transfer.    Please see problem specific assessment plan for details of patient's hospital course.          Please see above list of diagnoses and related plan for additional information.     Condition at Discharge: fair    Discharge Day Visit / Exam:   Subjective: No acute events this morning  Vitals: Blood Pressure: 165/92 (04/29/25 0931)  Pulse: 92 (04/29/25 0931)  Temperature: 97.9 °F (36.6 °C) (04/29/25 0543)  Temp Source: Temporal (04/29/25 0106)  Respirations: 18 (04/29/25 0927)  Height: 5' 4\" (162.6 cm) (04/29/25 0746)  Weight - Scale: 96.6 kg (213 lb) (04/29/25 0746)  SpO2: 96 % (04/29/25 0927)  Physical Exam   Physical Exam  Vitals and nursing note reviewed.   Constitutional:       General: She is not in acute distress.     Appearance: She is well-developed.   HENT:      Head: Normocephalic and atraumatic.   Eyes:      Conjunctiva/sclera: Conjunctivae normal.   Cardiovascular:      Rate and Rhythm: Normal rate and regular rhythm.      Heart sounds: No murmur heard.  Pulmonary:      Effort: Pulmonary effort is normal. No respiratory distress.      Breath sounds: Normal breath sounds. No wheezing or rales.   Abdominal:      General: There is no distension.      Palpations: Abdomen is soft.      Tenderness: There is no abdominal tenderness.   Musculoskeletal:         General: No swelling.      Cervical back: Neck supple.   Skin:     General: Skin is warm and dry.      Capillary Refill: Capillary refill takes less than 2 seconds.   Neurological:      Mental Status: She is alert and oriented to person, place, and time.   Psychiatric:         Mood and Affect: Mood normal.     Discussion with Family: Patient declined call to .     Discharge instructions/Information to patient and family:   See after visit " summary for information provided to patient and family.      Provisions for Follow-Up Care:  See after visit summary for information related to follow-up care and any pertinent home health orders.      Mobility at time of Discharge:   Basic Mobility Inpatient Raw Score: 17  JH-HLM Goal: 5: Stand one or more mins  JH-HLM Achieved: 4: Move to chair/commode  HLM Goal NOT achieved. Continue to encourage mobility in post discharge setting.     Disposition:   Other: [facility]    Planned Readmission: Yes    Discharge Medications:  See after visit summary for reconciled discharge medications provided to patient and/or family.      Administrative Statements   Discharge Statement:  I have spent a total time of 41 minutes in caring for this patient on the day of the visit/encounter. >30 minutes of time was spent on: Instructions for management, Patient and family education, Impressions, Documenting in the medical record, Reviewing / ordering tests, medicine, procedures  , and Communicating with other healthcare professionals .    **Please Note: This note may have been constructed using a voice recognition system**

## 2025-04-29 NOTE — ASSESSMENT & PLAN NOTE
Patient A&O x4. Left arm precautions/AVF. RA, 2L at night. NSR on tele. IV abx. Tolerating full liquid diet, advance as tolerated. Incontinent x2. VALENCIA BKA. R dressing C/D/I. Port cath R upper chest. Total lift. Patient updated on poc, awaiting MRI/ID consult. HD tomorrow per Neph. No further needs at this time.     Problem: Patient/Family Goals  Goal: Patient/Family Long Term Goal  Description: Patient's Long Term Goal:   Discharge back to facility  Interventions:-   Follow POC  - See additional Care Plan goals for specific interventions  Outcome: Progressing  Goal: Patient/Family Short Term Goal  Description: Patient's Short Term Goal: 4/4 NOC:Rest  Interventions: - cluster care  - See additional Care Plan goals for specific interventions  Outcome: Progressing      Wt Readings from Last 3 Encounters:   04/29/25 96.6 kg (213 lb)   04/22/25 92.8 kg (204 lb 9.6 oz)   03/17/25 85.3 kg (188 lb)   Mildly reduced LVEF 40%  Diuresing currently with lasix 50 IV BID  Continue  Monitor I and O  Daily standing weights  Monitor renal function and electrolytes

## 2025-04-29 NOTE — ASSESSMENT & PLAN NOTE
Wt Readings from Last 3 Encounters:   04/29/25 96.6 kg (213 lb)   04/22/25 92.8 kg (204 lb 9.6 oz)   03/17/25 85.3 kg (188 lb)   Noted 25 pound weight gain from previous weight in March 2025.  Patient reports med compliance and adherence to low sodium diet at home.  As stated, underwent priority echocardiogram this morning with results as follows:   Left Ventricle: Left ventricular cavity size is normal. Wall thickness is mildly increased. The left ventricular ejection fraction is 40% by visual estimation. Systolic function is mildly reduced. Diastolic function is mildly abnormal, consistent with grade I (abnormal) relaxation.    The following segments are akinetic: basal inferoseptal, mid inferoseptal and apical septal.    The following segments are hypokinetic: basal anteroseptal, basal inferior, basal inferolateral, basal anterolateral, mid anteroseptal, mid inferior, mid inferolateral, mid anterolateral, apical inferior and apical lateral.    All other segments are normal.  Patient received intravenous diuretic therapy in the ED this morning with adequate volume unloading and prompt resolution of respiratory symptoms  However, new heart failure concerning over underlying coronary disease and coordination with elevated cardiac enzymes (see above)  Will require initiation of GDMT

## 2025-04-29 NOTE — ED PROVIDER NOTES
Time reflects when diagnosis was documented in both MDM as applicable and the Disposition within this note       Time User Action Codes Description Comment    4/29/2025  4:55 AM David Sullivan [J96.01] Acute respiratory failure with hypoxia (HCC)     4/29/2025  4:55 AM David Sullivan [J45.901] Asthma with acute exacerbation, unspecified asthma severity, unspecified whether persistent     4/29/2025  4:56 AM David Sullivan [I50.9] Acute on chronic congestive heart failure, unspecified heart failure type (HCC)     4/29/2025  4:56 AM David Sullivan [J18.9] Pneumonia of both lungs due to infectious organism, unspecified part of lung     4/29/2025  4:57 AM David Sullivan [R79.89] Elevated troponin           ED Disposition       ED Disposition   Admit    Condition   Stable    Date/Time   Tue Apr 29, 2025  4:58 AM    Comment   Case was discussed with Bailey Carey and the patient's admission status was agreed to be Admission Status: inpatient status to the service of Dr. Bhatti.               Assessment & Plan       Medical Decision Making  Patient was seen and evaluated for her presentation as outlined.  Patient at risk for acute respiratory failure, asthma exacerbation, CHF exacerbation, hypertensive emergency, acute coronary syndrome, pneumonia, other.  Workup as shown.  Given additional DuoNeb after receiving DuoNebs and Solu-Medrol prior to arrival.  Required placement on BiPAP due to increased work of breathing.  Was eventually able to wean back off of BiPAP to nasal cannula.  Persistent oxygen requirement.  Chest x-ray concerning for CHF with likely superimposed pneumonia.  Elevated BNP and troponin.  EKG shows stable right bundle branch block, patient never had any chest pain.  Blood pressure improved with ED interventions.  Initially received IV fluids, but these were stopped and Lasix was given once BNP and chest x-ray resulted.  Rocephin and azithromycin given.  COVID and flu negative.   Cardiology consulted, see ED course notes.  Arranges made for admission to the hospital service with further management as indicated.    Critical Care Time Statement: Upon my evaluation, this patient had a high probability of imminent or life-threatening deterioration due to acute respiratory failure, severe hypertension, which required my direct attention, intervention, and personal management.  I spent a total of 60 minutes directly providing critical care services, including evaluating for the presence of life-threatening injuries or illnesses, management of organ system failure(s) , complex medical decision making (to support/prevent further life-threatening deterioration)., interpretation of hemodynamic data, and ventilator management. This time is exclusive of procedures, teaching, treating other patients, family meetings, and any prior time recorded by providers other than myself.        Amount and/or Complexity of Data Reviewed  Labs: ordered.  Radiology: ordered.  ECG/medicine tests: ordered and independent interpretation performed.     Details: EKG per my interpretation demonstrates sinus tachycardia at a ventricular rate of 110 bpm.  Left axis deviation, right bundle branch block.  No acute ST elevations.  Inferior T wave inversions present.  Compared with prior EKG from March 16, 2025, ventricular rate has increased by 33 bpm, EKG is otherwise unchanged, including inferior T wave inversions unchanged from prior.    Risk  Prescription drug management.  Decision regarding hospitalization.        ED Course as of 04/29/25 0529   Tue Apr 29, 2025   [id number] Consulted with Dr. Sebastian Dial with cardiology via secure chat regarding elevated troponin and pertinent aspects of patient's presentation.  States patient can be admitted here for now, continue to trend troponin, obtain limited echocardiogram to evaluate ejection fraction and wall motion.       Medications   sodium chloride 0.9 % bolus 250 mL (has no  administration in time range)   acetaminophen (TYLENOL) tablet 650 mg (has no administration in time range)   calcium carbonate (TUMS) chewable tablet 1,000 mg (has no administration in time range)   trimethobenzamide (TIGAN) IM injection 200 mg (has no administration in time range)   cefTRIAXone (ROCEPHIN) IVPB (premix in dextrose) 2,000 mg 50 mL (has no administration in time range)   benzonatate (TESSALON PERLES) capsule 100 mg (has no administration in time range)   methylPREDNISolone sodium succinate (Solu-MEDROL) injection 40 mg (has no administration in time range)   montelukast (SINGULAIR) tablet 10 mg (has no administration in time range)   levalbuterol (XOPENEX) inhalation solution 1.25 mg (has no administration in time range)     And   sodium chloride 0.9 % inhalation solution 3 mL (has no administration in time range)   ipratropium (ATROVENT) 0.02 % inhalation solution 0.5 mg (has no administration in time range)   fluticasone-vilanterol 200-25 mcg/actuation 1 puff (has no administration in time range)   methylPREDNISolone sodium succinate (FOR EMS ONLY) (Solu-MEDROL) 125 MG injection 125 mg (0 mg Does not apply Given to EMS 4/29/25 0107)   ipratropium-albuterol (FOR EMS ONLY) (DUO-NEB) 0.5-2.5 mg/3 mL inhalation solution 6 mL (0 mL Does not apply Given to EMS 4/29/25 0107)   ipratropium-albuterol (DUO-NEB) 0.5-2.5 mg/3 mL inhalation solution 3 mL (3 mL Nebulization Given 4/29/25 0123)   cefTRIAXone (ROCEPHIN) IVPB (premix in dextrose) 2,000 mg 50 mL (0 mg Intravenous Stopped 4/29/25 0227)   magnesium sulfate IVPB (premix) SOLN 1 g (0 g Intravenous Stopped 4/29/25 0304)   furosemide (LASIX) injection 40 mg (40 mg Intravenous Given 4/29/25 0206)   azithromycin (ZITHROMAX) 500 mg in sodium chloride 0.9 % 250 mL IVPB (0 mg Intravenous Stopped 4/29/25 0433)       ED Risk Strat Scores                    No data recorded        SBIRT 20yo+      Flowsheet Row Most Recent Value   Initial Alcohol Screen: US  AUDIT-C     1. How often do you have a drink containing alcohol? 0 Filed at: 04/29/2025 0108   2. How many drinks containing alcohol do you have on a typical day you are drinking?  0 Filed at: 04/29/2025 0108   3a. Male UNDER 65: How often do you have five or more drinks on one occasion? 0 Filed at: 04/29/2025 0108   3b. FEMALE Any Age, or MALE 65+: How often do you have 4 or more drinks on one occassion? 0 Filed at: 04/29/2025 0108   Audit-C Score 0 Filed at: 04/29/2025 0108   RONNA: How many times in the past year have you...    Used an illegal drug or used a prescription medication for non-medical reasons? Never Filed at: 04/29/2025 0108                            History of Present Illness       Chief Complaint   Patient presents with    Shortness of Breath     Shortness of breath for 1 week. Hx of asthma, breathing treatments ineffective. Pt was 60 percent on room air for EMS        Past Medical History:   Diagnosis Date    GERD (gastroesophageal reflux disease)     Heart failure (HCC)     High blood cholesterol     High blood pressure       Past Surgical History:   Procedure Laterality Date    APPENDECTOMY      BACK SURGERY      CATARACT EXTRACTION Bilateral     CHOLECYSTECTOMY      REPLACEMENT TOTAL KNEE Bilateral     TUBAL LIGATION        History reviewed. No pertinent family history.   Social History     Tobacco Use    Smoking status: Never     Passive exposure: Never    Smokeless tobacco: Never   Vaping Use    Vaping status: Never Used   Substance Use Topics    Alcohol use: Not Currently    Drug use: Never      E-Cigarette/Vaping    E-Cigarette Use Never User       E-Cigarette/Vaping Substances      I have reviewed and agree with the history as documented.     Patient presents to the emergency department via EMS from home for evaluation of shortness of breath that has developed gradually over the past week.  Patient has a history of asthma, also findings on recent CT concerning for interstitial lung  disease.  Recently saw her pulmonologist and was started on Trelegy.  She states since starting that medication, she feels like her symptoms have developed.  She denies any chest pain.  Denies any fever.  No nausea or vomiting.  Does not typically wear oxygen at home.  Upon EMS arrival, patient was reportedly saturating in the 60s.  She was given Solu-Medrol and 2 DuoNebs en route to the ED with partial improvement.  Patient also complains of headache.  Noted to be hypertensive on arrival.  No other complaints, modifying factors, or associated symptoms.        Review of Systems   All other systems reviewed and are negative.          Objective       ED Triage Vitals   Temperature Pulse Blood Pressure Respirations SpO2 Patient Position - Orthostatic VS   04/29/25 0106 04/29/25 0106 04/29/25 0106 04/29/25 0106 04/29/25 0106 04/29/25 0130   98.5 °F (36.9 °C) (!) 113 (!) 254/111 (!) 25 (!) 81 % Sitting      Temp Source Heart Rate Source BP Location FiO2 (%) Pain Score    04/29/25 0106 04/29/25 0106 04/29/25 0106 04/29/25 0207 04/29/25 0106    Temporal Monitor Left arm 40 No Pain      Vitals      Date and Time Temp Pulse SpO2 Resp BP Pain Score FACES Pain Rating User   04/29/25 0430 -- 92 95 % 22 153/77 -- -- SV   04/29/25 0400 -- 96 95 % 24 148/78 -- -- SV   04/29/25 0330 -- 92 95 % 23 158/80 -- -- SV   04/29/25 0315 -- 95 96 % 22 144/76 -- -- SV   04/29/25 0300 -- 89 98 % 23 145/75 -- -- SV   04/29/25 0245 -- 90 98 % 22 151/71 -- -- SV   04/29/25 0230 -- 92 99 % 24 151/71 -- -- SV   04/29/25 0215 -- 93 98 % 24 162/74 -- -- SV   04/29/25 0207 -- -- 95 % -- -- -- -- MBK   04/29/25 0200 -- 100 98 % 26 176/80 -- -- SV   04/29/25 0145 -- 106 95 % 32 182/89 -- --    04/29/25 0130 -- 109 93 % 28 187/93 -- --    04/29/25 0106 98.5 °F (36.9 °C) 113 81 % 25 254/111 No Pain -- AD            Physical Exam  Vitals and nursing note reviewed.   Constitutional:       General: She is not in acute distress.     Appearance: She is  well-developed. She is ill-appearing. She is not toxic-appearing.   HENT:      Head: Normocephalic and atraumatic.   Eyes:      Conjunctiva/sclera: Conjunctivae normal.   Cardiovascular:      Rate and Rhythm: Regular rhythm. Tachycardia present.      Heart sounds: No murmur heard.     No friction rub. No gallop.   Pulmonary:      Effort: Tachypnea and accessory muscle usage present. No respiratory distress.      Breath sounds: Decreased breath sounds and wheezing present. No rhonchi or rales.   Abdominal:      Palpations: Abdomen is soft.      Tenderness: There is no abdominal tenderness.   Musculoskeletal:         General: No swelling. Normal range of motion.      Cervical back: Normal range of motion and neck supple.      Right lower leg: No edema.      Left lower leg: No edema.   Skin:     General: Skin is warm and dry.      Capillary Refill: Capillary refill takes less than 2 seconds.   Neurological:      General: No focal deficit present.      Mental Status: She is alert and oriented to person, place, and time.   Psychiatric:         Mood and Affect: Mood normal.         Behavior: Behavior normal.         Results Reviewed       Procedure Component Value Units Date/Time    HS Troponin I 4hr [[id number]] Collected: 04/29/25 0523    Lab Status: No result Specimen: Blood from Arm, Left     HS Troponin 0hr (reflex protocol) [[id number]] Collected: 04/29/25 0523    Lab Status: No result Specimen: Blood from Arm, Left     Blood gas, venous [[id number]] Collected: 04/29/25 0523    Lab Status: No result Specimen: Blood from Arm, Left     Comprehensive metabolic panel [[id number]] Collected: 04/29/25 0523    Lab Status: No result Specimen: Blood from Arm, Left     Procalcitonin [[id number]] Collected: 04/29/25 0523    Lab Status: No result Specimen: Blood from Arm, Left     CBC and Platelet [[id number]] Collected: 04/29/25 0523    Lab Status: No result Specimen: Blood from Arm, Left     Sputum culture and Gram stain  [[id number]]     Lab Status: No result Specimen: Expectorated Sputum     UA (URINE) with reflex to Scope [[id number]]     Lab Status: No result Specimen: Urine     Vitamin B12 [[id number]]     Lab Status: No result Specimen: Blood     Folate [[id number]]     Lab Status: No result Specimen: Blood     Phosphorus [[id number]]     Lab Status: No result Specimen: Blood     HS Troponin I 2hr [[id number]]  (Abnormal) Collected: 04/29/25 0317    Lab Status: Final result Specimen: Blood from Arm, Left Updated: 04/29/25 0356     hs TnI 2hr 1,029 ng/L      Delta 2hr hsTnI 562 ng/L     Procalcitonin [[id number]]  (Abnormal) Collected: 04/29/25 0121    Lab Status: Final result Specimen: Blood from Arm, Left Updated: 04/29/25 0246     Procalcitonin 0.28 ng/ml     FLU/COVID Rapid Antigen (30 min. TAT) - Preferred screening test in ED [[id number]]  (Normal) Collected: 04/29/25 0121    Lab Status: Final result Specimen: Nares from Nose Updated: 04/29/25 0222     SARS COV Rapid Antigen Negative     Influenza A Rapid Antigen Negative     Influenza B Rapid Antigen Negative    Narrative:      This test has been performed using the Quidel Nora 2 FLU+SARS Antigen test under the Emergency Use Authorization (EUA). This test has been validated by the  and verified by the performing laboratory. The Nora uses lateral flow immunofluorescent sandwich assay to detect SARS-COV, Influenza A and Influenza B Antigen.     The Quidel Nora 2 SARS Antigen test does not differentiate between SARS-CoV and SARS-CoV-2.     Negative results are presumptive and may be confirmed with a molecular assay, if necessary, for patient management. Negative results do not rule out SARS-CoV-2 or influenza infection and should not be used as the sole basis for treatment or patient management decisions. A negative test result may occur if the level of antigen in a sample is below the limit of detection of this test.     Positive results are indicative of the  presence of viral antigens, but do not rule out bacterial infection or co-infection with other viruses.     All test results should be used as an adjunct to clinical observations and other information available to the provider.    FOR PEDIATRIC PATIENTS - copy/paste COVID Guidelines URL to browser: [email or web address]    Protime-INR [[id number]]  (Abnormal) Collected: 04/29/25 0121    Lab Status: Final result Specimen: Blood from Arm, Left Updated: 04/29/25 0221     Protime 29.4 seconds      INR 2.78    Narrative:      INR Therapeutic Range    Indication                                             INR Range      Atrial Fibrillation                                               2.0-3.0  Hypercoagulable State                                    2.0.2.3  Left Ventricular Asist Device                            2.0-3.0  Mechanical Heart Valve                                  -    Aortic(with afib, MI, embolism, HF, LA enlargement,    and/or coagulopathy)                                     2.0-3.0 (2.5-3.5)     Mitral                                                             2.5-3.5  Prosthetic/Bioprosthetic Heart Valve               2.0-3.0  Venous thromboembolism (VTE: VT, PE        2.0-3.0    APTT [[id number]]  (Normal) Collected: 04/29/25 0121    Lab Status: Final result Specimen: Blood from Arm, Left Updated: 04/29/25 0221     PTT 30 seconds     B-Type Natriuretic Peptide(BNP) [[id number]]  (Abnormal) Collected: 04/29/25 0121    Lab Status: Final result Specimen: Blood from Arm, Left Updated: 04/29/25 0202      pg/mL     HS Troponin 0hr (reflex protocol) [[id number]]  (Abnormal) Collected: 04/29/25 0121    Lab Status: Final result Specimen: Blood from Arm, Left Updated: 04/29/25 0155     hs TnI 0hr 467 ng/L     Comprehensive metabolic panel [[id number]]  (Abnormal) Collected: 04/29/25 0121    Lab Status: Final result Specimen: Blood from Arm, Left Updated:  04/29/25 0151     Sodium 142 mmol/L      Potassium 4.6 mmol/L      Chloride 113 mmol/L      CO2 22 mmol/L      ANION GAP 7 mmol/L      BUN 38 mg/dL      Creatinine 0.96 mg/dL      Glucose 159 mg/dL      Calcium 8.9 mg/dL      AST 22 U/L      ALT 10 U/L      Alkaline Phosphatase 68 U/L      Total Protein 6.2 g/dL      Albumin 3.6 g/dL      Total Bilirubin 0.55 mg/dL      eGFR 57 ml/min/1.73sq m     Narrative:      National Kid[employer] Foundation guidelines for Chronic Kidney Disease (CKD):     Stage 1 with normal or high GFR (GFR > 90 mL/min/1.73 square meters)    Stage 2 Mild CKD (GFR = 60-89 mL/min/1.73 square meters)    Stage 3A Moderate CKD (GFR = 45-59 mL/min/1.73 square meters)    Stage 3B Moderate CKD (GFR = 30-44 mL/min/1.73 square meters)    Stage 4 Severe CKD (GFR = 15-29 mL/min/1.73 square meters)    Stage 5 End Stage CKD (GFR <15 mL/min/1.73 square meters)  Note: GFR calculation is accurate only with a steady state creatinine    Magnesium [[id number]]  (Normal) Collected: 04/29/25 0121    Lab Status: Final result Specimen: Blood from Arm, Left Updated: 04/29/25 0151     Magnesium 2.2 mg/dL     Lipase [[id number]]  (Normal) Collected: 04/29/25 0121    Lab Status: Final result Specimen: Blood from Arm, Left Updated: 04/29/25 0151     Lipase 14 u/L     Lactic acid, plasma (w/reflex if result > 2.0) [[id number]]  (Normal) Collected: 04/29/25 0121    Lab Status: Final result Specimen: Blood from Arm, Left Updated: 04/29/25 0151     LACTIC ACID 1.9 mmol/L     Narrative:      Result may be elevated if tourniquet was used during collection.    Blood gas, venous [[id number]]  (Abnormal) Collected: 04/29/25 0121    Lab Status: Final result Specimen: Blood from Arm, Left Updated: 04/29/25 0137     pH, Jaret 7.340     pCO2, Jaret 37.6 mm Hg      pO2, Jaret 47.3 mm Hg      HCO3, Jaret 19.8 mmol/L      Base Excess, Jaret -5.4 mmol/L      O2 Content, Jaret 12.2 ml/dL      O2 HGB, VENOUS 79.0 %     CBC and differential [[id number]]   (Abnormal) Collected: 04/29/25 0121    Lab Status: Final result Specimen: Blood from Arm, Left Updated: 04/29/25 0131     WBC 14.62 Thousand/uL      RBC 2.85 Million/uL      Hemoglobin 9.2 g/dL      Hematocrit 30.1 %       fL      MCH 32.3 pg      MCHC 30.6 g/dL      RDW 17.0 %      MPV 11.3 fL      Platelets 318 Thousands/uL      nRBC 0 /100 WBCs      Segmented % 84 %      Immature Grans % 1 %      Lymphocytes % 9 %      Monocytes % 6 %      Eosinophils Relative 0 %      Basophils Relative 0 %      Absolute Neutrophils 12.35 Thousands/µL      Absolute Immature Grans 0.12 Thousand/uL      Absolute Lymphocytes 1.29 Thousands/µL      Absolute Monocytes 0.82 Thousand/µL      Eosinophils Absolute 0.00 Thousand/µL      Basophils Absolute 0.04 Thousands/µL     Blood culture #1 [[id number]] Collected: 04/29/25 0128    Lab Status: In process Specimen: Blood from Arm, Left Updated: 04/29/25 0130    Blood culture #2 [[id number]] Collected: 04/29/25 0121    Lab Status: In process Specimen: Blood from Arm, Left Updated: 04/29/25 0128            XR chest 1 view portable   Final Interpretation by Khris Farias DO (04/29 0232)      Enlarged cardiac silhouette with prominence of the pulmonary vascular and interstitial markings and patchy opacities as well as suspected right-sided pleural effusion, as described. Findings taken together suspicious for a component of fluid    overload/CHF. Superimposed infection considered in the appropriate clinical setting. Correlation with the patient's symptoms and laboratory values recommended.      Other findings as above.         Workstation performed: [id number]         CT chest wo contrast    (Results Pending)       Procedures    ED Medication and Procedure Management   Prior to Admission Medications   Prescriptions Last Dose Informant Patient Reported? Taking?   Cholecalciferol 25 MCG (1000 UT) capsule   Yes No   Sig: Take 1,000 Units by mouth daily   Fluticasone-Salmeterol  (Advair) 250-50 mcg/dose inhaler   Yes No   Sig: INHALE 1 DOSE BY MOUTH TWICE DAILY   Magnesium 400 MG TABS   Yes No   Multiple Vitamins-Minerals (PreserVision AREDS 2) CAPS   Yes No   Sig: Take by mouth   aspirin 81 mg chewable tablet   Yes No   Sig: Chew 81 mg daily   atorvastatin (LIPITOR) 40 mg tablet  Self Yes No   Sig: Take 40 mg by mouth daily at bedtime   butalbital-acetaminophen-caffeine (FIORICET,ESGIC) -40 mg per tablet   Yes No   Sig: TAKE 1 TABLET BY MOUTH EVERY 4 HOURS AS NEEDED FOR HEADACHE   carvedilol (COREG) 12.5 mg tablet   No No   Sig: Take 1 tablet (12.5 mg total) by mouth 2 (two) times a day with meals   fluticasone (FLONASE) 50 mcg/act nasal spray   Yes No   fluticasone-umeclidinium-vilanterol (Trelegy Ellipta) 100-62.5-25 mcg/actuation inhaler   No No   Sig: Inhale 1 puff daily Rinse mouth after use.   folic acid (FOLVITE) 1 mg tablet   Yes No   Sig: Take 1 mg by mouth daily   furosemide (LASIX) 40 mg tablet 4/28/2025  Yes Yes   Sig: Take 40 mg by mouth daily   gabapentin (NEURONTIN) 800 mg tablet   Yes No   Sig: Take 800 mg by mouth 3 (three) times a day   lisinopril (ZESTRIL) 10 mg tablet 4/28/2025 Self Yes Yes   Sig: Take 20 mg by mouth daily   montelukast (SINGULAIR) 10 mg tablet   Yes No   Sig: Take 10 mg by mouth   omeprazole (PriLOSEC) 40 MG capsule   Yes No   Sig: Take 1 capsule by mouth in the morning   warfarin (COUMADIN) 5 mg tablet 4/28/2025 Self Yes Yes   Sig: Take 5 mg by mouth daily IN THE EVENING      Facility-Administered Medications: None     Patient's Medications   Discharge Prescriptions    No medications on file     No discharge procedures on file.  ED SEPSIS DOCUMENTATION   Time reflects when diagnosis was documented in both MDM as applicable and the Disposition within this note       Time User Action Codes Description Comment    4/29/2025  4:55 AM David Sullivan [J96.01] Acute respiratory failure with hypoxia (HCC)     4/29/2025  4:55 AM David Sullivan Add  [J45.901] Asthma with acute exacerbation, unspecified asthma severity, unspecified whether persistent     4/29/2025  4:56 AM David Sullivan [I50.9] Acute on chronic congestive heart failure, unspecified heart failure type (HCC)     4/29/2025  4:56 AM David Sullivan [J18.9] Pneumonia of both lungs due to infectious organism, unspecified part of lung     4/29/2025  4:57 AM David Sullivan Add [R79.89] Elevated troponin                  David Sullivan MD  04/29/25 0529

## 2025-04-29 NOTE — ASSESSMENT & PLAN NOTE
Pt with anginal symptoms a week prior to admission  Came in for ongoing sob and found to have elevated troponin level   Placed on heparin  Echocardiogram showing newly reduced LVEF and new wall motion abnormality  Recommend transfer for coronary angiography- pt wishes to go to [facility]   Pt is npo   Continue statin and BB, ASA  Pt currently without cp

## 2025-04-29 NOTE — ASSESSMENT & PLAN NOTE
"Wt Readings from Last 3 Encounters:   04/29/25 96.7 kg (213 lb 3.5 oz)   04/22/25 92.8 kg (204 lb 9.6 oz)   03/17/25 85.3 kg (188 lb)   Noted 25 pound weight gain from previous weight in March 2025.  Patient reports med compliance and adherence to low sodium diet at home.  As above  Chest x-ray: \"Enlarged cardiac silhouette with prominence of the pulmonary vascular and interstitial markings and patchy opacities as well as suspected right-sided pleural effusion, as described. Findings taken together suspicious for a component of fluid overload/CHF. Superimposed infection considered in the appropriate clinical setting. Correlation with the patient's symptoms and laboratory values recommended.\"  Troponin: 467 -> 1029, trend  BNP elevated at 636  EKG shows sinus tachycardia at 110 with RBBB  Repeat EKG shows NSR at 95 with RBBB.  Patient report substernal chest tightness but denies any chest pain.   Noted prior hospitalization in mid March with similar symptoms and influenza A.   Elevated troponin likely due to demand ischemia.   Per ED provider, on call cardiologist Dr. Dial consulted - recommended admission, trend troponin, and obtain limited echocardiogram to evaluate ejection fraction and wall motion.   Daily weights and strict I's and O's  Continue low-sodium cardiac diet with 1800 mL fluid restriction  Received Lasix 40 mg IV in the ED, continue Lasix 50 mg twice daily  Appreciate cardiology recommendations  "

## 2025-04-29 NOTE — H&P
"H&P - Hospitalist   Name: Mary Carmen Woo 79 y.o. female I MRN: [id number]  Unit/Bed#: -Junior I Date of Admission: 4/29/2025   Date of Service: 4/29/2025 I Hospital Day: 0     Assessment & Plan  Sepsis (HCC)  Met sepsis criteria with , RR 25, WBC at 14.62, and suspected pneumonia.  Chest x-ray: \"Enlarged cardiac silhouette with prominence of the pulmonary vascular and interstitial markings and patchy opacities as well as suspected right-sided pleural effusion, as described. Findings taken together suspicious for a component of fluid overload/CHF. Superimposed infection considered in the appropriate clinical setting. Correlation with the patient's symptoms and laboratory values recommended.\"  CT chest pending  COVID/flu negative  No fevers, lactic acid normal at 1.9  Procalcitonin elevated at 0.29, trend  WBC elevated at 14.62 -> 15.64, trend  Received  Blood cultures, urine strep pneumonia, urine Legionella, sputum culture, and MRSA pending  Received Rocephin 2000 mg IV in the ED, continue  Received 250 mL NS bolus  Monitor fever curve, vital signs, symptoms  Moderate persistent asthma with acute exacerbation  Patient presents with SOB and hypoxia, found to be in acute respiratory failure, asthma exacerbation, CHF exacerbation, and likely pneumonia. She initially presented with acute respiratory difficulty satting in the 60s for EMS upon their arrival to her home.  She was placed on supplemental oxygen PTA to the ED  and received Solu-Medrol and 2 DuoNeb's in the field.   She received additional DuoNebs in the ED.   She initially required BiPAP placement due to increased work of breathing, but was weaned down to NC, currently on 2.5 L NC.   Noted history of moderate persistent asthma.  Patient recently seen on 4/22 and started on Trelegy.  Patient reports worsening shortness of breath since starting medication.  Home medications: Advair, albuterol HFA, Trelegy Ellipta, and Singulair  Follows outpatient with " "[facility] respiratory specialist for sleep apnea  Received Solu-Medrol 125 mg IV in the ED, continue Solu-Medrol 40 mg IV every 6 hours  Received azithromycin 500 mg IV in the ED  Received DuoNeb in the ED, continue Atrovent, Xopenex, and sodium chloride nebulizer  Respiratory and airway clearance protocol  Appreciate pulmonary recommendations  Acute respiratory failure with hypoxia (HCC)  As above  Patient 81% on RA, placed on 2 L NC at 93%  Was placed on BiPAP briefly in the ED, currently weaned down back to 2 L nasal cannula  Initial VBG shows metabolic acidosis  Repeat VBG pending  Currently on 2.5 L NC @ 95%   Denies any home oxygen use, wean as able   Hypertensive urgency  BP in the /111, current /77  Home medication: Coreg, Lasix, lisinopril  Continue home medications  Monitor BP  Elevated troponin  Troponin: 467 -> 1029, trend  BNP elevated at 636  EKG shows sinus tachycardia at 110 with RBBB  Repeat EKG shows NSR at 95 with RBBB  Patient denies any chest pain  Appreciate cardiology recommendations  Chronic heart failure with preserved ejection fraction (HCC)  Wt Readings from Last 3 Encounters:   04/29/25 96.7 kg (213 lb 3.5 oz)   04/22/25 92.8 kg (204 lb 9.6 oz)   03/17/25 85.3 kg (188 lb)   Noted 25 pound weight gain from previous weight in March 2025.  Patient reports med compliance and adherence to low sodium diet at home.  As above  Chest x-ray: \"Enlarged cardiac silhouette with prominence of the pulmonary vascular and interstitial markings and patchy opacities as well as suspected right-sided pleural effusion, as described. Findings taken together suspicious for a component of fluid overload/CHF. Superimposed infection considered in the appropriate clinical setting. Correlation with the patient's symptoms and laboratory values recommended.\"  Troponin: 467 -> 1029, trend  BNP elevated at 636  EKG shows sinus tachycardia at 110 with RBBB  Repeat EKG shows NSR at 95 with RBBB.  Patient report " substernal chest tightness but denies any chest pain.   Noted prior hospitalization in mid March with similar symptoms and influenza A.   Elevated troponin likely due to demand ischemia.   Per ED provider, on call cardiologist Dr. Dial consulted - recommended admission, trend troponin, and obtain limited echocardiogram to evaluate ejection fraction and wall motion.   Daily weights and strict I's and O's  Continue low-sodium cardiac diet with 1800 mL fluid restriction  Received Lasix 40 mg IV in the ED, continue Lasix 50 mg twice daily  Appreciate cardiology recommendations  Anemia  Lab Results   Component Value Date    HGB 9.3 (L) 04/29/2025    HGB 9.2 (L) 04/29/2025    HGB 10.1 (L) 03/19/2025    HCT 29.2 (L) 04/29/2025    HCT 30.1 (L) 04/29/2025    HCT 31.7 (L) 03/19/2025   Hemoglobin on admission 9.2  Iron panel pending  Trend CBC  LYDIA (obstructive sleep apnea)  Noted history of LYDIA  Was recommended CPAP nightly use but did not follow compliance  Start CPAP at night  Follows outpatient with [facility] respiratory specialist    VTE Pharmacologic Prophylaxis: VTE Score: 9 High Risk (Score >/= 5) - Pharmacological DVT Prophylaxis Ordered: warfarin (Coumadin). Sequential Compression Devices Ordered.  Code Status: Level 1 - Full Code per patient  Discussion with family: Patient declined call to .     Anticipated Length of Stay: Patient will be admitted on an inpatient basis with an anticipated length of stay of greater than 2 midnights secondary to sepsis, asthma exacerbation, CHF exacerbation, and acute respiratory failure management.    History of Present Illness   Chief Complaint: Hypoxia and shortness of breath    Mary Carmen Woo is a 79 y.o. female with a PMH of GERD, heart failure, hyperlipidemia, hypertension, DVT, asthma, CAD who presents with hypoxia and shortness of breath. Patient presents with SOB and hypoxia, found to be in acute respiratory failure, asthma exacerbation, CHF exacerbation, and likely  pneumonia. She initially presented with acute respiratory difficulty satting in the 60s for EMS upon their arrival to her home.  She was placed on supplemental oxygen PTA to the ED  and received Solu-Medrol and 2 DuoNeb's in the field. She initially required BiPAP placement due to increased work of breathing, but was weaned down to NC, currently on 2.5 L NC. Chest x-ray showed enlarged cardiac silhouette with prominence of the pulmonary vascular and interstitial markings and patchy opacities as well as suspected right-sided pleural effusion, as described. Findings taken together suspicious for a component of fluid overload/CHF. Superimposed infection considered in the appropriate clinical setting. Correlation with the patient's symptoms and laboratory values recommended.    Review of Systems   Constitutional:  Positive for activity change, fatigue and unexpected weight change. Negative for chills and fever.   HENT:  Positive for voice change. Negative for ear pain and sore throat.    Eyes:  Positive for photophobia. Negative for pain and visual disturbance.   Respiratory:  Positive for cough, chest tightness and shortness of breath. Negative for wheezing.         Nonproductive cough   Cardiovascular:  Negative for chest pain and palpitations.   Gastrointestinal:  Positive for nausea and vomiting. Negative for abdominal pain.        Emesis x 1   Endocrine: Negative.    Genitourinary:  Negative for dysuria and hematuria.   Musculoskeletal:  Negative for arthralgias and back pain.   Skin:  Negative for color change and rash.   Allergic/Immunologic: Negative.    Neurological:  Positive for headaches. Negative for dizziness, seizures, syncope, light-headedness and numbness.        5/10 headache   Hematological: Negative.    Psychiatric/Behavioral: Negative.     All other systems reviewed and are negative.    Historical Information   Past Medical History:   Diagnosis Date    Asthma     Coronary artery disease     Diabetes  mellitus (HCC)     DVT (deep venous thrombosis) (HCC)     GERD (gastroesophageal reflux disease)     Heart failure (HCC)     High blood cholesterol     High blood pressure     History of transfusion     Hypertension      Past Surgical History:   Procedure Laterality Date    APPENDECTOMY      BACK SURGERY      CATARACT EXTRACTION Bilateral     CHOLECYSTECTOMY      REPLACEMENT TOTAL KNEE Bilateral     TUBAL LIGATION       Social History     Tobacco Use    Smoking status: Never     Passive exposure: Never    Smokeless tobacco: Never   Vaping Use    Vaping status: Never Used   Substance and Sexual Activity    Alcohol use: Not Currently    Drug use: Never    Sexual activity: Not on file     E-Cigarette/Vaping    E-Cigarette Use Never User      E-Cigarette/Vaping Substances    Nicotine No     THC No     CBD No     Flavoring No     Other No     Unknown No      Family History   Problem Relation Age of Onset    Diabetes Mother     Heart disease Father     Heart disease Brother     Diabetes Son     Hodgkin's lymphoma Daughter     No Known Problems Maternal Grandmother     No Known Problems Maternal Grandfather     No Known Problems Paternal Grandmother     No Known Problems Paternal Grandfather      Social History:  Marital Status: /Civil Union   Occupation: Retired/working  Patient Pre-hospital Living Situation: Home, With spouse  Patient Pre-hospital Level of Mobility: walks  Patient Pre-hospital Diet Restrictions: Low sodium diet    Meds/Allergies   I have reviewed home medications with patient personally.  Prior to Admission medications    Medication Sig Start Date End Date Taking? Authorizing Provider   furosemide (LASIX) 40 mg tablet Take 40 mg by mouth daily 11/20/23  Yes Historical Provider, MD   lisinopril (ZESTRIL) 10 mg tablet Take 20 mg by mouth daily 11/20/23  Yes Historical Provider, MD   warfarin (COUMADIN) 5 mg tablet Take 5 mg by mouth daily IN THE EVENING 3/21/24  Yes Historical Provider, MD   aspirin  81 mg chewable tablet Chew 81 mg daily    Historical Provider, MD   atorvastatin (LIPITOR) 40 mg tablet Take 40 mg by mouth daily at bedtime 7/31/23 4/22/25  Historical Provider, MD   butalbital-acetaminophen-caffeine (FIORICET,ESGIC) -40 mg per tablet TAKE 1 TABLET BY MOUTH EVERY 4 HOURS AS NEEDED FOR HEADACHE 3/4/25   Historical Provider, MD   carvedilol (COREG) 12.5 mg tablet Take 1 tablet (12.5 mg total) by mouth 2 (two) times a day with meals 3/19/25   Sherrie Rucker MD   Cholecalciferol 25 MCG (1000 UT) capsule Take 1,000 Units by mouth daily    Historical Provider, MD   fluticasone (FLONASE) 50 mcg/act nasal spray  4/18/25   Historical Provider, MD   Fluticasone-Salmeterol (Advair) 250-50 mcg/dose inhaler INHALE 1 DOSE BY MOUTH TWICE DAILY 3/8/24   Historical Provider, MD   fluticasone-umeclidinium-vilanterol (Trelegy Ellipta) 100-62.5-25 mcg/actuation inhaler Inhale 1 puff daily Rinse mouth after use. 4/22/25 5/22/25  DEIDRE Bauer   folic acid (FOLVITE) 1 mg tablet Take 1 mg by mouth daily 9/28/23 4/22/25  Historical Provider, MD   gabapentin (NEURONTIN) 800 mg tablet Take 800 mg by mouth 3 (three) times a day 2/2/24   Historical Provider, MD   Magnesium 400 MG TABS     Historical Provider, MD   montelukast (SINGULAIR) 10 mg tablet Take 10 mg by mouth 3/11/24 4/22/25  Historical Provider, MD   Multiple Vitamins-Minerals (PreserVision AREDS 2) CAPS Take by mouth    Historical Provider, MD   omeprazole (PriLOSEC) 40 MG capsule Take 1 capsule by mouth in the morning 3/24/25   Historical Provider, MD     Allergies   Allergen Reactions    Codeine GI Intolerance and Other (See Comments)     GI upset    GI upset   GI upset    Sulfasalazine Other (See Comments)    Amlodipine Rash and Swelling    Sulfa Antibiotics Other (See Comments) and Rash     Flushing    Flushing   Other reaction(s): Other (See Comments)   Flushing     Objective :  Temp:  [97.9 °F (36.6 °C)-98.5 °F (36.9 °C)] 97.9 °F (36.6 °C)  HR:   [] 94  BP: (144-254)/() 151/97  Resp:  [19-32] 19  SpO2:  [81 %-99 %] 94 %  O2 Device: Nasal cannula  Nasal Cannula O2 Flow Rate (L/min):  [2 L/min] 2 L/min  FiO2 (%):  [40] 40    Physical Exam  Constitutional:       General: She is not in acute distress.     Appearance: She is not ill-appearing.   Cardiovascular:      Rate and Rhythm: Regular rhythm. Tachycardia present.      Pulses: Normal pulses.      Heart sounds: Normal heart sounds. No murmur heard.  Pulmonary:      Effort: Pulmonary effort is normal. Tachypnea present. No respiratory distress.      Breath sounds: Decreased breath sounds present. No wheezing or rales.   Abdominal:      General: Bowel sounds are normal. There is no distension.      Palpations: Abdomen is soft.      Tenderness: There is abdominal tenderness.   Musculoskeletal:         General: No swelling or tenderness.   Skin:     General: Skin is warm and dry.      Findings: No erythema or rash.   Neurological:      General: No focal deficit present.      Mental Status: She is alert. Mental status is at baseline.      Motor: Weakness present.   Psychiatric:         Mood and Affect: Mood normal.       Lines/Drains:      Lab Results: I have reviewed the following results:  Results from last 7 days   Lab Units 04/29/25  0523 04/29/25  0121   WBC Thousand/uL 15.64* 14.62*   HEMOGLOBIN g/dL 9.3* 9.2*   HEMATOCRIT % 29.2* 30.1*   PLATELETS Thousands/uL 309 318   SEGS PCT %  --  84*   LYMPHO PCT %  --  9*   MONO PCT %  --  6   EOS PCT %  --  0     Results from last 7 days   Lab Units 04/29/25  0523   SODIUM mmol/L 143   POTASSIUM mmol/L 4.6   CHLORIDE mmol/L 115*   CO2 mmol/L 21   BUN mg/dL 40*   CREATININE mg/dL 0.96   ANION GAP mmol/L 7   CALCIUM mg/dL 8.6   ALBUMIN g/dL 3.6   TOTAL BILIRUBIN mg/dL 0.48   ALK PHOS U/L 66   ALT U/L 10   AST U/L 22   GLUCOSE RANDOM mg/dL 177*     Results from last 7 days   Lab Units 04/29/25  0121   INR  2.78*     Lab Results   Component Value Date     HGBA1C 6.6 (H) 03/24/2025    HGBA1C 5.5 03/21/2024    HGBA1C 6.0 09/18/2023     Results from last 7 days   Lab Units 04/29/25  0523 04/29/25  0121   LACTIC ACID mmol/L  --  1.9   PROCALCITONIN ng/ml 3.35* 0.28*     Imaging Results Review: I reviewed radiology reports from this admission including: chest xray and CT chest.  Other Study Results Review: EKG was reviewed.     Administrative Statements     ** Please Note: This note has been constructed using a voice recognition system. **

## 2025-04-29 NOTE — ASSESSMENT & PLAN NOTE
"Met sepsis criteria with , RR 25, WBC at 14.62, and suspected pneumonia.  Chest x-ray: \"Enlarged cardiac silhouette with prominence of the pulmonary vascular and interstitial markings and patchy opacities as well as suspected right-sided pleural effusion, as described. Findings taken together suspicious for a component of fluid overload/CHF. Superimposed infection considered in the appropriate clinical setting. Correlation with the patient's symptoms and laboratory values recommended.\"  CT chest pending  COVID/flu negative  No fevers, lactic acid normal at 1.9  Procalcitonin elevated at 0.29, trend  WBC elevated at 14.62 -> 15.64, trend  Received  Blood cultures, urine strep pneumonia, urine Legionella, sputum culture, and MRSA pending  Received Rocephin 2000 mg IV in the ED, continue  Received 250 mL NS bolus  Monitor fever curve, vital signs, symptoms  "

## 2025-04-29 NOTE — CONSULTS
Consultation - Cardiology   Name: Mary Carmen Woo 79 y.o. female I MRN: [id number]  Unit/Bed#: -01 I Date of Admission: 4/29/2025   Date of Service: 4/29/2025 I Hospital Day: 0   Inpatient consult to Cardiology  Consult performed by: Terese Garcia PA-C  Consult ordered by: DEIDRE Perry        Physician Requesting Evaluation: Pratibha Bhatti MD   Reason for Evaluation / Principal Problem: Elevated troponin     Assessment & Plan  Sepsis (HCC)  Concern for pneumonia  Medicine team treating  See below  Elevated troponin  Pt with anginal symptoms a week prior to admission  Came in for ongoing sob and found to have elevated troponin level   Placed on heparin  Echocardiogram showing newly reduced LVEF and new wall motion abnormality  Recommend transfer for coronary angiography- pt wishes to go to [facility]   Pt is npo   Continue statin and BB, ASA  Pt currently without cp   Hypertensive urgency  Elevated on admission   Chronic heart failure with preserved ejection fraction (HCC)  Wt Readings from Last 3 Encounters:   04/29/25 96.6 kg (213 lb)   04/22/25 92.8 kg (204 lb 9.6 oz)   03/17/25 85.3 kg (188 lb)   Mildly reduced LVEF 40%  Diuresing currently with lasix 50 IV BID  Continue  Monitor I and O  Daily standing weights  Monitor renal function and electrolytes   Acute respiratory failure with hypoxia (HCC)  Currently on 1-2 L  Baseline requirement is RA  I have discussed the above management plan in detail with the primary service.     History of Present Illness   Mary Carmen Woo is a 79 y.o. female with history of recent influenza A infection presented to the ED last night for worsening sob and chest pain.  She reports that in the last week she was having worsening sob and chest pain when she was up and moving. She came to the ED overnight for concern for pneumonia but elevated tropnins noted. Pt has not had chest pain since admission. Echocardiogram this AM showing newly found cardiomyopathy with  new wall motion abnormality. Pt is currently on heparin. She wishes to pursue aggressive medical management. Reports a fhx of CAD.    Review of Systems   Constitutional:  Negative for chills, fatigue and unexpected weight change.   Respiratory:  Positive for cough, chest tightness, shortness of breath and wheezing.    Cardiovascular:  Positive for chest pain.   Gastrointestinal:  Negative for nausea.   Genitourinary:  Negative for difficulty urinating.   Musculoskeletal:  Negative for arthralgias.   Neurological:  Negative for dizziness and weakness.     Medical History Review: I have reviewed the patient's PMH, PSH, Social History, Family History, Meds, and Allergies     Objective :  Temp:  [97.9 °F (36.6 °C)-98.5 °F (36.9 °C)] 97.9 °F (36.6 °C)  HR:  [] 99  BP: (144-254)/() 164/94  Resp:  [19-32] 19  SpO2:  [81 %-99 %] 97 %  O2 Device: Nasal cannula  Nasal Cannula O2 Flow Rate (L/min):  [2 L/min] 2 L/min  FiO2 (%):  [40] 40  Orthostatic Blood Pressures      Flowsheet Row Most Recent Value   Blood Pressure 164/94 filed at 04/29/2025 0814   Patient Position - Orthostatic VS Sitting filed at 04/29/2025 0430          First Weight: Weight - Scale: 99.7 kg (219 lb 12.8 oz) (04/29/25 0106)  Vitals:    04/29/25 0600 04/29/25 0746   Weight: 96.7 kg (213 lb 3.5 oz) 96.6 kg (213 lb)       Physical Exam  Vitals and nursing note reviewed.   Constitutional:       Appearance: Normal appearance.   HENT:      Head: Normocephalic and atraumatic.   Cardiovascular:      Rate and Rhythm: Normal rate.      Heart sounds: No murmur heard.  Pulmonary:      Effort: Pulmonary effort is normal.      Breath sounds: Wheezing and rhonchi present.   Abdominal:      Palpations: Abdomen is soft.   Musculoskeletal:         General: Swelling present.      Cervical back: Neck supple.   Skin:     General: Skin is warm.      Capillary Refill: Capillary refill takes less than 2 seconds.   Neurological:      General: No focal deficit present.       Mental Status: She is alert.   Psychiatric:         Mood and Affect: Mood normal.           Lab Results: I have reviewed the following results:CBC/BMP:   .     04/29/25  0121 04/29/25  0317 04/29/25  0523 04/29/25  1009   WBC 14.62*  --  15.64* 13.25*   HGB 9.2*  --  9.3* 9.1*   HCT 30.1*  --  29.2* 29.1*     --  309 294   SODIUM 142  --  143  --    K 4.6  --  4.6  --    *  --  115*  --    CO2 22  --  21  --    BUN 38*  --  40*  --    CREATININE 0.96  --  0.96  --    GLUC 159*  --  177*  --    MG 2.2  --   --   --    PHOS  --  4.2*  --   --       Results from last 7 days   Lab Units 04/29/25  0523 04/29/25  0121   WBC Thousand/uL 15.64* 14.62*   HEMOGLOBIN g/dL 9.3* 9.2*   HEMATOCRIT % 29.2* 30.1*   PLATELETS Thousands/uL 309 318     Results from last 7 days   Lab Units 04/29/25  0523 04/29/25  0121   POTASSIUM mmol/L 4.6 4.6   CHLORIDE mmol/L 115* 113*   CO2 mmol/L 21 22   BUN mg/dL 40* 38*   CREATININE mg/dL 0.96 0.96   CALCIUM mg/dL 8.6 8.9     Results from last 7 days   Lab Units 04/29/25  0758 04/29/25  0121   INR  2.28* 2.78*   PTT seconds  --  30     Lab Results   Component Value Date    HGBA1C 6.6 (H) 03/24/2025     Lab Results   Component Value Date    TROPONINI 0.50 (HH) 04/23/2023       Imaging Results Review: I reviewed radiology reports from this admission including: Echocardiogram.  Other Study Results Review: EKG was reviewed.     Echo 4/29/2025:      Left Ventricle: Left ventricular cavity size is normal. Wall thickness is mildly increased. The left ventricular ejection fraction is 40% by visual estimation. Systolic function is mildly reduced. Diastolic function is mildly abnormal, consistent with grade I (abnormal) relaxation.    The following segments are akinetic: basal inferoseptal, mid inferoseptal and apical septal.    The following segments are hypokinetic: basal anteroseptal, basal inferior, basal inferolateral, basal anterolateral, mid anteroseptal, mid inferior, mid  inferolateral, mid anterolateral, apical inferior and apical lateral.    All other segments are normal.    Left Atrium: The atrium is mildly dilated.    Mitral Valve: There is mild regurgitation.    Tricuspid Valve: There is mild regurgitation. The estimated right ventricular systolic pressure is 36.00 mmHg.    Pericardium: There is a small pericardial effusion. There is no echocardiographic evidence of tamponade.    EKG 4/29/2025:  Normal sinus rhythm  Right bundle branch block  Abnormal ECG  When compared with ECG of 29-Apr-2025 04:00, (unconfirmed)  T wave inversion less evident in Anterior leads  Confirmed by Sebastian Dial ([id number]) on 4/29/2025

## 2025-04-29 NOTE — ASSESSMENT & PLAN NOTE
BP in the /111, current /77  Home medication: Coreg, Lasix, lisinopril  Continue home medications  Monitor BP

## 2025-04-29 NOTE — ASSESSMENT & PLAN NOTE
As stated, patient presented this morning (4/29) with respiratory distress  Ultimately, thought the result of acute pulmonary edema in the setting of new diagnosis of HFmrEF and possible cardiac equivalent with underlying coronary disease  History of asthma/COPD-initial index suspicion for perhaps some degree of exacerbation-was administered intravenous steroids in the ED (4/29)  At this juncture, going to discontinue the steroids-asthma does not appear to be exacerbated  Continue aerosols and nebulizers upon transfer

## 2025-04-29 NOTE — ASSESSMENT & PLAN NOTE
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

## 2025-04-29 NOTE — ASSESSMENT & PLAN NOTE
Troponin: 467 -> 1029  Patient high risk CANELO-elevated heart score-echocardiogram performed this morning (4/29) reveals decreased ejection fraction with wall motion abnormality new from previous with high index of suspicion for underlying coronary disease  As stated, reviewed case with cardiology who saw the patient in consultation in an urgent manner-recommendation for transfer to tertiary care center for ischemic workup-patient accepted at [facility]  Received aspirin in the ED-heparin drip initiated-beta-blockers and oxygen

## 2025-04-29 NOTE — ASSESSMENT & PLAN NOTE
As above  Patient 81% on RA, placed on 2 L NC at 93%  Was placed on BiPAP briefly in the ED, currently weaned down back to 2 L nasal cannula  Initial VBG shows metabolic acidosis  Repeat VBG pending  Currently on 2.5 L NC @ 95%   Denies any home oxygen use, wean as able

## 2025-04-29 NOTE — ASSESSMENT & PLAN NOTE
Troponin: 467 -> 1029, trend  BNP elevated at 636  EKG shows sinus tachycardia at 110 with RBBB  Repeat EKG shows NSR at 95 with RBBB  Patient denies any chest pain  Appreciate cardiology recommendations

## 2025-04-29 NOTE — ED NOTES
Patient still very Sob while sitting in bed, stated she doesn't feel much better after the breathing treatments, provider made aware. Respiratory called for Bipap.      Alyssa Jarrett RN  04/29/25 0144

## 2025-04-29 NOTE — ASSESSMENT & PLAN NOTE
Lab Results   Component Value Date    HGB 9.3 (L) 04/29/2025    HGB 9.2 (L) 04/29/2025    HGB 10.1 (L) 03/19/2025    HCT 29.2 (L) 04/29/2025    HCT 30.1 (L) 04/29/2025    HCT 31.7 (L) 03/19/2025   Hemoglobin on admission 9.2  Iron panel pending  Trend CBC

## 2025-04-29 NOTE — CASE MANAGEMENT
Case Management Discharge Planning Note    Patient name Mary Carmen Woo  Location /-01 MRN [id number]  : 1945 Date 2025       Current Admission Date: 2025  Current Admission Diagnosis:Acute respiratory failure with hypoxia (HCC)   Patient Active Problem List    Diagnosis Date Noted Date Diagnosed    Anemia 2025     Sepsis (HCC) 2025     Acute respiratory failure with hypoxia (HCC) 2025     KALLI (acute kidney injury) (HCC) 2025     Acute tracheobronchitis 2025     Abnormal CT of the chest 2025     Moderate persistent asthma with acute exacerbation 2025     Hypertensive urgency 2025     Elevated troponin 2025     Chronic heart failure with preserved ejection fraction (HCC) 2025     History of pulmonary embolism 2025     LYDIA (obstructive sleep apnea) 2025       LOS (days): 0  Geometric Mean LOS (GMLOS) (days):   Days to GMLOS:     OBJECTIVE:  Risk of Unplanned Readmission Score: 23.94         Current admission status: Inpatient   Preferred Pharmacy:   [facility] [address] [place] [address] [address] [facility]  [address] [facility] [address]  Phone: [phone] Fax: [phone]    Primary Care Provider: Maya Quezada MD    Primary Insurance: [facility]  Secondary Insurance:     DISCHARGE DETAILS:     CM made aware by provider of transfer to [facility] for cardiac cath as she follows them for her care.   CM completed medical necessity and placed in patient folder.     CM to follow patient's care and discharge needs.

## 2025-04-29 NOTE — ASSESSMENT & PLAN NOTE
Patient presents with SOB and hypoxia, found to be in acute respiratory failure, asthma exacerbation, CHF exacerbation, and likely pneumonia. She initially presented with acute respiratory difficulty satting in the 60s for EMS upon their arrival to her home.  She was placed on supplemental oxygen PTA to the ED  and received Solu-Medrol and 2 DuoNeb's in the field.   She received additional DuoNebs in the ED.   She initially required BiPAP placement due to increased work of breathing, but was weaned down to NC, currently on 2.5 L NC.   Noted history of moderate persistent asthma.  Patient recently seen on 4/22 and started on Trelegy.  Patient reports worsening shortness of breath since starting medication.  Home medications: Advair, albuterol HFA, Trelegy Ellipta, and Singulair  Follows outpatient with [facility] respiratory specialist for sleep apnea  Received Solu-Medrol 125 mg IV in the ED, continue Solu-Medrol 40 mg IV every 6 hours  Received azithromycin 500 mg IV in the ED  Received DuoNeb in the ED, continue Atrovent, Xopenex, and sodium chloride nebulizer  Respiratory and airway clearance protocol  Appreciate pulmonary recommendations

## 2025-04-29 NOTE — PLAN OF CARE
Problem: Potential for Falls  Goal: Patient will remain free of falls  Description: INTERVENTIONS:- Educate patient/family on patient safety including physical limitations- Instruct patient to call for assistance with activity - Consult OT/PT to assist with strengthening/mobility - Keep Call bell within reach- Keep bed low and locked with side rails adjusted as appropriate- Keep care items and personal belongings within reach- Initiate and maintain comfort rounds- Make Fall Risk Sign visible to staff- Offer Toileting every 2 Hours, in advance of need- Initiate/Maintain bed alarm- Obtain necessary fall risk management equipment: non-skid socks- Apply yellow socks and bracelet for high fall risk patients- Consider moving patient to room near nurses station  INTERVENTIONS:- Educate patient/family on patient safety including physical limitations- Instruct patient to call for assistance with activity - Consult OT/PT to assist with strengthening/mobility - Keep Call bell within reach- Keep bed low and locked with side rails adjusted as appropriate- Keep care items and personal belongings within reach- Initiate and maintain comfort rounds- Make Fall Risk Sign visible to staff- Offer Toileting every 2 Hours, in advance of need- Initiate/Maintain bed alarm- Obtain necessary fall risk management equipment: non-skid socks- Apply yellow socks and bracelet for high fall risk patients- Consider moving patient to room near nurses station  4/29/2025 0626 by Kenisha Rivera RN  Outcome: Progressing  4/29/2025 0625 by Kenisha Rivera, RN  Outcome: Progressing     Problem: PAIN - ADULT  Goal: Verbalizes/displays adequate comfort level or baseline comfort level  Description: Interventions:- Encourage patient to monitor pain and request assistance- Assess pain using appropriate pain scale- Administer analgesics based on type and severity of pain and evaluate response- Implement non-pharmacological measures as appropriate and evaluate  response- Consider cultural and social influences on pain and pain management- Notify physician/advanced practitioner if interventions unsuccessful or patient reports new pain  4/29/2025 0626 by Kenisha Rivera RN  Outcome: Progressing  4/29/2025 0625 by Kenisha Rivera RN  Outcome: Progressing     Problem: INFECTION - ADULT  Goal: Absence or prevention of progression during hospitalization  Description: INTERVENTIONS:- Assess and monitor for signs and symptoms of infection- Monitor lab/diagnostic results- Monitor all insertion sites, i.e. indwelling lines, tubes, and drains- Monitor endotracheal if appropriate and nasal secretions for changes in amount and color- [place] appropriate cooling/warming therapies per order- Administer medications as ordered- Instruct and encourage patient and family to use good hand hygiene technique- Identify and instruct in appropriate isolation precautions for identified infection/condition  4/29/2025 0626 by Kenisha Rivera RN  Outcome: Progressing  4/29/2025 0625 by Kenisha Rivera RN  Outcome: Progressing  Goal: Absence of fever/infection during neutropenic period  Description: INTERVENTIONS:- Monitor WBC  4/29/2025 0626 by Kenisha Rivera RN  Outcome: Progressing  4/29/2025 0625 by Kenisha Rivera RN  Outcome: Progressing     Problem: SAFETY ADULT  Goal: Patient will remain free of falls  Description: INTERVENTIONS:- Educate patient/family on patient safety including physical limitations- Instruct patient to call for assistance with activity - Consult OT/PT to assist with strengthening/mobility - Keep Call bell within reach- Keep bed low and locked with side rails adjusted as appropriate- Keep care items and personal belongings within reach- Initiate and maintain comfort rounds- Make Fall Risk Sign visible to staff- Offer Toileting every 2 Hours, in advance of need- Initiate/Maintain bed alarm- Obtain necessary fall risk management equipment: non-skid socks- Apply yellow socks and bracelet  for high fall risk patients- Consider moving patient to room near nurses station  INTERVENTIONS:- Educate patient/family on patient safety including physical limitations- Instruct patient to call for assistance with activity - Consult OT/PT to assist with strengthening/mobility - Keep Call bell within reach- Keep bed low and locked with side rails adjusted as appropriate- Keep care items and personal belongings within reach- Initiate and maintain comfort rounds- Make Fall Risk Sign visible to staff- Offer Toileting every 2 Hours, in advance of need- Initiate/Maintain bed alarm- Obtain necessary fall risk management equipment: non-skid socks- Apply yellow socks and bracelet for high fall risk patients- Consider moving patient to room near nurses station  4/29/2025 0626 by Kenisha Rivera RN  Outcome: Progressing  4/29/2025 0625 by Kenisha Rivera RN  Outcome: Progressing  Goal: Maintain or return to baseline ADL function  Description: INTERVENTIONS:-  Assess patient's ability to carry out ADLs; assess patient's baseline for ADL function and identify physical deficits which impact ability to perform ADLs (bathing, care of mouth/teeth, toileting, grooming, dressing, etc.)- Assess/evaluate cause of self-care deficits - Assess range of motion- Assess patient's mobility; develop plan if impaired- Assess patient's need for assistive devices and provide as appropriate- Encourage maximum independence but intervene and supervise when necessary- Involve family in performance of ADLs- Assess for home care needs following discharge - Consider OT consult to assist with ADL evaluation and planning for discharge- Provide patient education as appropriate  4/29/2025 0626 by Kenisha Rivera, RN  Outcome: Progressing  4/29/2025 0625 by Kenisha Rivera, RN  Outcome: Progressing  Goal: Maintains/Returns to pre admission functional level  Description: INTERVENTIONS:- Perform AM-PAC 6 Click Basic Mobility/ Daily Activity assessment daily.- Set and  communicate daily mobility goal to care team and patient/family/caregiver. - Collaborate with rehabilitation services on mobility goals if consulted- Perform Range of Motion 3 times a day.- Reposition patient every 2 hours.- Dangle patient 3 times a day- Stand patient 3 times a day- Ambulate patient 3 times a day- Out of bed to chair 3 times a day - Out of bed for meals 3 times a day- Out of bed for toileting- Record patient progress and toleration of activity level   4/29/2025 0626 by Kenisha Rivera RN  Outcome: Progressing  4/29/2025 0625 by Kenisha Rivera RN  Outcome: Progressing     Problem: DISCHARGE PLANNING  Goal: Discharge to home or other facility with appropriate resources  Description: INTERVENTIONS:- Identify barriers to discharge w/patient and caregiver- Arrange for needed discharge resources and transportation as appropriate- Identify discharge learning needs (meds, wound care, etc.)- Arrange for interpretive services to assist at discharge as needed- Refer to Case Management Department for coordinating discharge planning if the patient needs post-hospital services based on physician/advanced practitioner order or complex needs related to functional status, cognitive ability, or social support system  4/29/2025 0626 by Kenisha Rivera RN  Outcome: Progressing  4/29/2025 0625 by Kenisha Rivera RN  Outcome: Progressing     Problem: Knowledge Deficit  Goal: Patient/family/caregiver demonstrates understanding of disease process, treatment plan, medications, and discharge instructions  Description: Complete learning assessment and assess knowledge base.Interventions:- Provide teaching at level of understanding- Provide teaching via preferred learning methods  4/29/2025 0626 by Kenisha Rivera RN  Outcome: Progressing  4/29/2025 0625 by Kenisha Rivera RN  Outcome: Progressing     Problem: RESPIRATORY - ADULT  Goal: Achieves optimal ventilation and oxygenation  Description: INTERVENTIONS:- Assess for changes in  respiratory status- Assess for changes in mentation and behavior- Position to facilitate oxygenation and minimize respiratory effort- Oxygen administered by appropriate delivery if ordered- Initiate smoking cessation education as indicated- Encourage broncho-pulmonary hygiene including cough, deep breathe, Incentive Spirometry- Assess the need for suctioning and aspirate as needed- Assess and instruct to report SOB or any respiratory difficulty- Respiratory Therapy support as indicated  4/29/2025 0626 by Kenisha Rivera, RN  Outcome: Progressing  4/29/2025 0625 by Kenisha Rivera, RN  Outcome: Progressing

## 2025-04-29 NOTE — PLAN OF CARE
Problem: Potential for Falls  Goal: Patient will remain free of falls  Description: INTERVENTIONS:- Educate patient/family on patient safety including physical limitations- Instruct patient to call for assistance with activity - Consult OT/PT to assist with strengthening/mobility - Keep Call bell within reach- Keep bed low and locked with side rails adjusted as appropriate- Keep care items and personal belongings within reach- Initiate and maintain comfort rounds- Make Fall Risk Sign visible to staff- Offer Toileting every 2 Hours, in advance of need- Initiate/Maintain bed alarm- Obtain necessary fall risk management equipment: non-skid socks- Apply yellow socks and bracelet for high fall risk patients- Consider moving patient to room near nurses station  INTERVENTIONS:- Educate patient/family on patient safety including physical limitations- Instruct patient to call for assistance with activity - Consult OT/PT to assist with strengthening/mobility - Keep Call bell within reach- Keep bed low and locked with side rails adjusted as appropriate- Keep care items and personal belongings within reach- Initiate and maintain comfort rounds- Make Fall Risk Sign visible to staff- Offer Toileting every 2 Hours, in advance of need- Initiate/Maintain bed alarm- Obtain necessary fall risk management equipment: non-skid socks- Apply yellow socks and bracelet for high fall risk patients- Consider moving patient to room near nurses station  Outcome: Progressing     Problem: PAIN - ADULT  Goal: Verbalizes/displays adequate comfort level or baseline comfort level  Description: Interventions:- Encourage patient to monitor pain and request assistance- Assess pain using appropriate pain scale- Administer analgesics based on type and severity of pain and evaluate response- Implement non-pharmacological measures as appropriate and evaluate response- Consider cultural and social influences on pain and pain management- Notify  physician/advanced practitioner if interventions unsuccessful or patient reports new pain  Outcome: Progressing     Problem: INFECTION - ADULT  Goal: Absence or prevention of progression during hospitalization  Description: INTERVENTIONS:- Assess and monitor for signs and symptoms of infection- Monitor lab/diagnostic results- Monitor all insertion sites, i.e. indwelling lines, tubes, and drains- Monitor endotracheal if appropriate and nasal secretions for changes in amount and color- [place] appropriate cooling/warming therapies per order- Administer medications as ordered- Instruct and encourage patient and family to use good hand hygiene technique- Identify and instruct in appropriate isolation precautions for identified infection/condition  Outcome: Progressing  Goal: Absence of fever/infection during neutropenic period  Description: INTERVENTIONS:- Monitor WBC  Outcome: Progressing     Problem: SAFETY ADULT  Goal: Patient will remain free of falls  Description: INTERVENTIONS:- Educate patient/family on patient safety including physical limitations- Instruct patient to call for assistance with activity - Consult OT/PT to assist with strengthening/mobility - Keep Call bell within reach- Keep bed low and locked with side rails adjusted as appropriate- Keep care items and personal belongings within reach- Initiate and maintain comfort rounds- Make Fall Risk Sign visible to staff- Offer Toileting every 2 Hours, in advance of need- Initiate/Maintain bed alarm- Obtain necessary fall risk management equipment: non-skid socks- Apply yellow socks and bracelet for high fall risk patients- Consider moving patient to room near nurses station  INTERVENTIONS:- Educate patient/family on patient safety including physical limitations- Instruct patient to call for assistance with activity - Consult OT/PT to assist with strengthening/mobility - Keep Call bell within reach- Keep bed low and locked with side rails adjusted as  appropriate- Keep care items and personal belongings within reach- Initiate and maintain comfort rounds- Make Fall Risk Sign visible to staff- Offer Toileting every 2 Hours, in advance of need- Initiate/Maintain bed alarm- Obtain necessary fall risk management equipment: non-skid socks- Apply yellow socks and bracelet for high fall risk patients- Consider moving patient to room near nurses station  Outcome: Progressing  Goal: Maintain or return to baseline ADL function  Description: INTERVENTIONS:-  Assess patient's ability to carry out ADLs; assess patient's baseline for ADL function and identify physical deficits which impact ability to perform ADLs (bathing, care of mouth/teeth, toileting, grooming, dressing, etc.)- Assess/evaluate cause of self-care deficits - Assess range of motion- Assess patient's mobility; develop plan if impaired- Assess patient's need for assistive devices and provide as appropriate- Encourage maximum independence but intervene and supervise when necessary- Involve family in performance of ADLs- Assess for home care needs following discharge - Consider OT consult to assist with ADL evaluation and planning for discharge- Provide patient education as appropriate  Outcome: Progressing  Goal: Maintains/Returns to pre admission functional level  Description: INTERVENTIONS:- Perform AM-PAC 6 Click Basic Mobility/ Daily Activity assessment daily.- Set and communicate daily mobility goal to care team and patient/family/caregiver. - Collaborate with rehabilitation services on mobility goals if consulted- Perform Range of Motion 3 times a day.- Reposition patient every 2 hours.- Dangle patient 3 times a day- Stand patient 3 times a day- Ambulate patient 3 times a day- Out of bed to chair 3 times a day - Out of bed for meals 3 times a day- Out of bed for toileting- Record patient progress and toleration of activity level   Outcome: Progressing     Problem: DISCHARGE PLANNING  Goal: Discharge to home  or other facility with appropriate resources  Description: INTERVENTIONS:- Identify barriers to discharge w/patient and caregiver- Arrange for needed discharge resources and transportation as appropriate- Identify discharge learning needs (meds, wound care, etc.)- Arrange for interpretive services to assist at discharge as needed- Refer to Case Management Department for coordinating discharge planning if the patient needs post-hospital services based on physician/advanced practitioner order or complex needs related to functional status, cognitive ability, or social support system  Outcome: Progressing     Problem: Knowledge Deficit  Goal: Patient/family/caregiver demonstrates understanding of disease process, treatment plan, medications, and discharge instructions  Description: Complete learning assessment and assess knowledge base.Interventions:- Provide teaching at level of understanding- Provide teaching via preferred learning methods  Outcome: Progressing     Problem: RESPIRATORY - ADULT  Goal: Achieves optimal ventilation and oxygenation  Description: INTERVENTIONS:- Assess for changes in respiratory status- Assess for changes in mentation and behavior- Position to facilitate oxygenation and minimize respiratory effort- Oxygen administered by appropriate delivery if ordered- Initiate smoking cessation education as indicated- Encourage broncho-pulmonary hygiene including cough, deep breathe, Incentive Spirometry- Assess the need for suctioning and aspirate as needed- Assess and instruct to report SOB or any respiratory difficulty- Respiratory Therapy support as indicated  Outcome: Progressing

## 2025-04-29 NOTE — EMTALA/ACUTE CARE TRANSFER
[facility]  [address] [facility] [address]  Dept: [phone]      ACUTE CARE TRANSFER CONSENT    NAME Mary Carmen Woo                                         1945                              MRN [id number]    I have been informed of my rights regarding examination, treatment, and transfer   by Dr. Dez Anders DO    Benefits:  Availability of interventional cardiology    Risks:  Routine risks of transport      Consent for Transfer:  I acknowledge that my medical condition has been evaluated and explained to me by the treating physician or other qualified medical person and/or my attending physician, who has recommended that I be transferred to the service of    at  . The above potential benefits of such transfer, the potential risks associated with such transfer, and the probable risks of not being transferred have been explained to me, and I fully understand them.  The doctor has explained that, in my case, the benefits of transfer outweigh the risks.  I agree to be transferred.    I authorize the performance of emergency medical procedures and treatments upon me in both transit and upon arrival at the receiving facility.  Additionally, I authorize the release of any and all medical records to the receiving facility and request they be transported with me, if possible.  I understand that the safest mode of transportation during a medical emergency is an ambulance and that the Hospital advocates the use of this mode of transport. Risks of traveling to the receiving facility by car, including absence of medical control, life sustaining equipment, such as oxygen, and medical personnel has been explained to me and I fully understand them.    (NA CORRECT BOX BELOW)  [  ]  I consent to the stated transfer and to be transported by ambulance/helicopter.  [  ]  I consent to the stated transfer, but refuse transportation by ambulance and accept full responsibility  for my transportation by car.  I understand the risks of non-ambulance transfers and I exonerate the Hospital and its staff from any deterioration in my condition that results from this refusal.    X___________________________________________    DATE  25  TIME________  Signature of patient or legally responsible individual signing on patient behalf           RELATIONSHIP TO PATIENT_________________________          Provider Certification    NAME Mary Carmen Woo                                         1945                              MRN [id number]    A medical screening exam was performed on the above named patient.  Based on the examination:    Condition Necessitating Transfer coronary syndrome    Patient Condition:  Stable    Reason for Transfer:  Higher level of care-availability of interventional cardiology    Transfer Requirements: Facility     Space available and qualified personnel available for treatment as acknowledged by    Agreed to accept transfer and to provide appropriate medical treatment as acknowledged by          Appropriate medical records of the examination and treatment of the patient are provided at the time of transfer   STAFF INITIAL WHEN COMPLETED _______  Transfer will be performed by qualified personnel from    and appropriate transfer equipment as required, including the use of necessary and appropriate life support measures.    Provider Certification: I have examined the patient and explained the following risks and benefits of being transferred/refusing transfer to the patient/family:         Based on these reasonable risks and benefits to the patient and/or the unborn child(karolina), and based upon the information available at the time of the patient’s examination, I certify that the medical benefits reasonably to be expected from the provision of appropriate medical treatments at another medical facility outweigh the increasing risks, if any, to the individual’s medical  condition, and in the case of labor to the unborn child, from effecting the transfer.    X____________________________________________ DATE 04/29/25        TIME_______      ORIGINAL - SEND TO MEDICAL RECORDS   COPY - SEND WITH PATIENT DURING TRANSFER

## 2025-04-29 NOTE — ASSESSMENT & PLAN NOTE
"Met SIRS criteria on presentation (4/29) with , RR 25, WBC at 14.62  Chest x-ray: \"Enlarged cardiac silhouette with prominence of the pulmonary vascular and interstitial markings and patchy opacities as well as suspected right-sided pleural effusion, as described. Findings taken together suspicious for a component of fluid overload/CHF. Superimposed infection considered in the appropriate clinical setting. Correlation with the patient's symptoms and laboratory values recommended.\"  CT chest   Small bilateral pleural effusions, right greater than left and bilateral multi lobar patchy consolidation and groundglass opacities may represent pulmonary vascular congestion. Correlate with clinical findings to exclude superimposed pneumonia.   Groundglass opacities more likely to reflect underlying pulmonary edema than infectious infiltrates however given presentation with SIRS-treat empirically  COVID/flu negative  No fevers, lactic acid normal at 1.9  Blood cultures, urine strep pneumonia, urine Legionella, sputum culture, and MRSA pending  Received Rocephin 2000 mg IV in the ED, continue  "

## 2025-04-29 NOTE — TRANSPORTATION MEDICAL NECESSITY
"Section I - General Information    Name of Patient: Mary Carmen Woo                 : 1945    Medicare #: [id number]  Transport Date: 25 (PCS is valid for round trips on this date and for all repetitive trips in the 60-day range as noted below.)  Origin: [facility] MED SURG [facility]                                                         Destination: [address]     Is the pt's stay covered under Medicare Part A (PPS/DRG)   []     Closest appropriate facility? If no, why is transport to more distant facility required? Yes  If hospice pt, is this transport related to pt's terminal illness? NA       Section II - Medical Necessity Questionnaire  Ambulance transportation is medically necessary only if other means of transport are contraindicated or would be potentially harmful to the patient. To meet this requirement, the patient must either be \"bed confined\" or suffer from a condition such that transport by means other than ambulance is contraindicated by the patient's condition. The following questions must be answered by the medical professional signing below for this form to be valid:    1)  Describe the MEDICAL CONDITION (physical and/or mental) of this patient AT THE TIME OF AMBULANCE TRANSPORT that requires the patient to be transported in an ambulance and why transport by other means is contraindicated by the patient's condition:  Patient being transferred to higher level of care, specifically to be evaluated cardiac cath that cannot be competed at [facility] and being transferred to original specialist for continuity of care.    2) Is the patient \"bed confined\" as defined below?     No  To be \"be confined\" the patient must satisfy all three of the following conditions: (1) unable to get up from bed without Assistance; AND (2) unable to ambulate; AND (3) unable to sit in a chair or wheelchair.    3) Can this patient safely be transported by car " or wheelchair van (i.e., seated during transport without a medical attendant or monitoring)?   No    4) In addition to completing questions 1-3 above, please check any of the following conditions that apply*:   *Note: supporting documentation for any boxes checked must be maintained in the patient's medical records.  IV meds/fluids required   Medical attendant required   Requires oxygen-unable to self administer  Cardiac monitoring required en route     If hosp-hosp transfer, describe services needed at 2nd facility not available at 1st facility?   Hos-Hos transfer - Services not available at [facility]     Section III - Signature of Physician or Healthcare Professional  I certify that the above information is true and correct based on my evaluation of this patient, and represent that the patient requires transport by ambulance and that other forms of transport are contraindicated. I understand that this information will be used by the Centers for Medicare and Medicaid Services (CMS) to support the determination of medical necessity for ambulance services, and I represent that I have personal knowledge of the patient's condition at time of transport.    []  If this box is checked, I also certify that the patient is physically or mentally incapable of signing the ambulance service's claim and that the institution with which I am affiliated has furnished care, services, or assistance to the patient.    My signature below is made on behalf of the patient pursuant to 42 CFR §424.36(b)(4). In accordance with 42 CFR §424.37, the specific reason(s) that the patient is physically or mentally incapable of signing the claim form is as follows:       Signature of Physician* or Healthcare Professional  ____________________________________________  Signature Date 04/29/25 (For scheduled repetitive transports, this form is not valid for transports performed more than 60 days after this date)    Printed Name & Credentials of Physician  or Healthcare Professional (MD, DO, RN, etc.)____NANI Cabrales_______________  *Form must be signed by patient's attending physician for scheduled, repetitive transports. For non-repetitive, unscheduled ambulance transports, if unable to obtain the signature of the attending physician, any of the following may sign (choose appropriate option below)  [] Physician Assistant []  Clinical Nurse Specialist []  Registered Nurse  []  Nurse Practitioner  [x] Discharge Planner

## 2025-04-29 NOTE — ASSESSMENT & PLAN NOTE
Patient presented early this morning (4/29) with persistent/progressive shortness of breath  Patient had been experiencing shortness of breath with exertion and then at rest over the past several weeks  In the day prior to presentation (4/28) patient with respiratory distress prompting presentation  Patient also with subjective intermittent chest discomfort associated with activity    In ED, patient with increased work of breathing and hypoxia requiring transient BiPAP therapy  Evidence on plain film and CT imaging of pulmonary edema and mild pleural effusions raising concern over exacerbation of underlying diastolic heart failure (based on previous echocardiogram)  Patient received intravenous diuretic therapy in the ED and was liberated from BiPAP therapy to nasal cannula-2 L    This morning (4/29) patient underwent priority echocardiogram which revealed mildly reduced ejection fraction of 40% which is new compared to previous  Furthermore, patient with significant elevation of cardiac enzymes raising concern over underlying ischemic disease  Patient was evaluated by cardiology in an urgent fashion in consultation with recommendations for transfer for possible ischemic workup    Patient was administered aspirin and initiated on heparin infusion given index suspicion for underlying coronary disease    Case was discussed with [facility] cardiology (Dr. Lopez) who agrees with possible need of ischemic workup and plan as outlined-recommended admission to the hospitalist service at [facility]  Subsequently, I spoke with the hospitalist Dr. Ji who gladly accepted the patient to intermediate care    The patient is hemodynamically stable and chest pain-free at the time of transfer  She will transfer via ALS

## 2025-04-30 LAB — MRSA NOSE QL CULT: NORMAL

## 2025-04-30 NOTE — NURSING NOTE
Patient report given to transport team and RN at receiving facility by RN on previous shift. Patient verbalized understanding for need for transfer. Patient was connected to telemetry, 1 L NC, and heparin gtt running at 8.1 units/kg/hour. Patient vital signs stable, no complaints of pain, and A&O x 4 on discharge.

## 2025-04-30 NOTE — UTILIZATION REVIEW
Initial Clinical Review    Admission: Date/Time/Statement:   Admission Orders (From admission, onward)       Ordered        04/29/25 0459  INPATIENT ADMISSION  Once                          Orders Placed This Encounter   Procedures    INPATIENT ADMISSION     Standing Status:   Standing     Number of Occurrences:   1     Level of Care:   Med Surg [16]     Estimated length of stay:   More than 2 Midnights     Certification:   I certify that inpatient services are medically necessary for this patient for a duration of greater than two midnights. See H&P and MD Progress Notes for additional information about the patient's course of treatment.     ED Arrival Information       Expected   -    Arrival   4/29/2025 01:03    Acuity   Emergent              Means of arrival   Ambulance    Escorted by   [facility]    Service   Hospitalist    Admission type   Emergency              Arrival complaint   sob             Chief Complaint   Patient presents with    Shortness of Breath     Shortness of breath for 1 week. Hx of asthma, breathing treatments ineffective. Pt was 60 percent on room air for EMS        Initial Presentation: 79 y.o. female to ED via EMS from home  Present to ED with hypoxia and shortness of breath. found to be in acute respiratory failure, asthma exacerbation, CHF exacerbation, and likely pneumonia. She initially presented with acute respiratory difficulty satting in the 60s for EMS upon their arrival to her home. She initially required BiPAP.  Patient report substernal chest tightness but denies any chest pain.   PMHX GERD, heart failure, hyperlipidemia, hypertension, DVT, asthma, CAD   Admitted to [facility] with DX: Acute respiratory failure with hypoxia   on exam: tachy; hypertensive; tachypnea; RA sat 81% - O2 @ 2L via nc sat 93%; WBC 15.64; Heme 9.2; INR 2.78; procal 3.35; VBG shows metabolic acidosis;  Troponin: 467 -> 1029;    CXR  Chest x-ray showed enlarged cardiac silhouette with prominence of  the pulmonary vascular and interstitial markings and patchy opacities as well as suspected right-sided pleural effusion.  Findings taken together suspicious for a component of fluid overload/CHF. Superimposed infection considered in the appropriate clinical setting.    EKG shows sinus tachycardia at 110 with RBBB / Repeat EKG shows NSR at 95 with RBBB.  PLAN: cont iv abx; cont iv lasix; cont solumedrol iv; cont DuoNebs; cont Heparin gtt; recd IVF bolus 250 x1; Cardiopulmonary monitoring; monitor labs; f/u orthostatics; cardiology consult; f/u CT; f/u blood / urine cx; f/u urine strep pneumonia, urine Legionella, sputum culture, and MRSA; titrate O2; trend / monitor BP's; trend / monitor TROPs; trend / monitor procal; f/u echo; f/u iron panel      Anticipated Length of Stay/Certification Statement: Patient will be admitted on an inpatient basis with an anticipated length of stay of greater than 2 midnights secondary to sepsis, asthma exacerbation, CHF exacerbation, and acute respiratory failure management.       Discharge Summary - TRANSFERRED TO [facility].   Hospital Course:   Mary Carmen Woo is a 79 y.o. female patient who originally presented to the hospital on 4/29/2025 due to persistent/progressive dyspnea/respiratory distress.  Initially, in respiratory distress with tachycardia and hypoxia.  Transiently required BiPAP.  Evidence of pulmonary edema and received IV diuretics.  Underwent priority echocardiogram which revealed reduced ejection fraction from previous.  Furthermore, elevation of cardiac enzymes raising concern for underlying coronary disease.  Received aspirin and was initiated on heparin infusion.  Evaluated by in-house cardiology with recommendations for transfer for possible ischemic workup.  Case discussed with [facility] cardi[facility] and accepted in transfer by hospitalist service to [facility].  Patient is hemodynamically stable and chest pain-free at the  time of transfer.      ED Treatment-Medication Administration from 04/29/2025 0103 to 04/29/2025 0534         Date/Time Order Dose Route Action     04/29/2025 0107 methylPREDNISolone sodium succinate (FOR EMS ONLY) (Solu-MEDROL) 125 MG injection 125 mg 0 mg Does not apply Given to EMS     04/29/2025 0107 ipratropium-albuterol (FOR EMS ONLY) (DUO-NEB) 0.5-2.5 mg/3 mL inhalation solution 6 mL 0 mL Does not apply Given to EMS     04/29/2025 0122 sodium chloride 0.9 % bolus 500 mL 500 mL Intravenous New Bag     04/29/2025 0123 ipratropium-albuterol (DUO-NEB) 0.5-2.5 mg/3 mL inhalation solution 3 mL 3 mL Nebulization Given     04/29/2025 0157 cefTRIAXone (ROCEPHIN) IVPB (premix in dextrose) 2,000 mg 50 mL 2,000 mg Intravenous New Bag     04/29/2025 0158 magnesium sulfate IVPB (premix) SOLN 1 g 1 g Intravenous New Bag     04/29/2025 0206 furosemide (LASIX) injection 40 mg 40 mg Intravenous Given     04/29/2025 0336 azithromycin (ZITHROMAX) 500 mg in sodium chloride 0.9 % 250 mL IVPB 500 mg Intravenous New Bag            Medication   sodium chloride 0.9 % bolus 500 mL   fluticasone (ARNUITY ELLIPTA) 100 MCG/ACT inhaler 1 puff   sodium chloride 0.9 % inhalation solution 3 mL   warfarin (COUMADIN) tablet 5 mg   heparin (ACS LOW)   methylPREDNISolone sodium succinate (Solu-MEDROL) injection 40 mg Q6H   furosemide (LASIX) 40 mg tablet   warfarin (COUMADIN) 5 mg tablet   butalbital-acetaminophen-caffeine (FIORICET,ESGIC) -40 mg per tablet 1 tablet   heparin (porcine) injection 2,000 Units   heparin (porcine) injection 4,000 Units   heparin (porcine) 25,000 units in 0.45% NaCl 250 mL infusion (premix)   atorvastatin (LIPITOR) tablet 40 mg   magnesium Oxide (MAG-OX) tablet 400 mg   fluticasone (FLONASE) 50 mcg/act nasal spray 1 spray   pantoprazole (PROTONIX) EC tablet 40 mg   lisinopril (ZESTRIL) tablet 40 mg   gabapentin (NEURONTIN) capsule 800 mg   Cholecalciferol (VITAMIN D3) tablet 1,000 Units   carvedilol (COREG)  tablet 12.5 mg   aspirin chewable tablet 81 mg   furosemide (LASIX) injection 50 mg BID   fluticasone-vilanterol 200-25 mcg/actuation 1 puff   ipratropium (ATROVENT) 0.02 % inhalation solution 0.5 mg   levalbuterol (XOPENEX) inhalation solution 1.25 mg   montelukast (SINGULAIR) tablet 10 mg   benzonatate (TESSALON PERLES) capsule 100 mg   cefTRIAXone (ROCEPHIN) IVPB (premix in dextrose) 2,000 mg 50 mL Q24H   trimethobenzamide (TIGAN) IM injection 200 mg   calcium carbonate (TUMS) chewable tablet 1,000 mg   acetaminophen (TYLENOL) tablet 650 mg     sodium chloride 0.9 % bolus 250 mL  Dose: 250 mL  Freq: Once Route: IV  Indications of Use: FLUID AND ELECTROLYTE DISTURBANCE  Last Dose: 250 mL (04/29/25 0600)  Start: 04/29/25 0530 End: 04/29/25 0700        ED Triage Vitals [04/29/25 0106]   Temperature Pulse Respirations Blood Pressure SpO2 Pain Score   98.5 °F (36.9 °C) (!) 113 (!) 25 (!) 254/111 (!) 81 % No Pain     Weight (last 2 days) before discharge       Date/Time Weight    04/29/25 0746 96.6 (213)    04/29/25 0600 96.7 (213.22)    04/29/25 0536 96.7 (213.22)    04/29/25 0106 99.7 (219.8)            Vital Signs (last 3 days) before discharge       Date/Time Temp Pulse Resp BP MAP (mmHg) SpO2 FiO2 (%) Calculated FIO2 (%) - Nasal Cannula Nasal Cannula O2 Flow Rate (L/min) O2 Device O2 Interface Device Patient Position - Orthostatic VS [place] Coma Scale Score Pain    04/29/25 22:05:37 97.5 °F (36.4 °C) 86 -- 146/68 94 93 % -- -- -- -- -- -- -- --    04/29/25 2017 -- -- -- -- -- 94 % -- -- -- -- -- -- -- --    04/29/25 17:08:47 -- 90 -- 168/90 116 95 % -- -- -- -- -- -- -- --    04/29/25 15:24:17 97.5 °F (36.4 °C) 89 18 168/90 116 94 % -- -- -- -- -- -- -- --    04/29/25 1430 -- -- -- -- -- 95 % -- 24 1 L/min Nasal cannula -- -- -- --    04/29/25 1300 -- -- -- -- -- -- -- -- -- -- -- -- -- 5 04/29/25 1254 -- -- -- -- -- -- -- -- -- -- -- -- 15 5 04/29/25 12:50:09 -- 97 -- 157/90 112 95 % -- 24 1 L/min Nasal  cannula -- Standing -- --    04/29/25 12:49:13 -- 94 -- 156/91 113 94 % -- -- -- -- -- Sitting -- --    04/29/25 12:45:48 97.5 °F (36.4 °C) 89 -- 159/90 113 94 % -- -- -- -- -- Lying -- --    04/29/25 0935 -- -- -- -- -- -- -- -- -- -- -- -- -- 6    04/29/25 0931 -- 92 -- 165/92 119 -- -- -- -- -- -- -- -- --    04/29/25 09:27:51 -- 93 18 166/95 119 96 % -- -- -- -- -- -- -- --    04/29/25 0900 -- -- -- 170/91 117 -- -- -- -- -- -- -- -- --    04/29/25 0845 -- -- -- 165/92 116 -- -- -- -- -- -- -- -- --    04/29/25 0830 -- -- -- 166/93 117 -- -- -- -- -- -- -- -- --    04/29/25 08:14:26 -- 99 -- 164/94 117 97 % -- -- -- -- -- -- -- --    04/29/25 0800 -- 88 -- 186/94 -- -- -- -- -- -- -- -- -- --    04/29/25 0746 -- 87 -- 151/97 -- 96 % -- 28 2 L/min Nasal cannula -- -- -- --    04/29/25 0620 -- -- -- -- -- 94 % -- 28 2 L/min Nasal cannula -- -- 15 --    04/29/25 05:43:54 97.9 °F (36.6 °C) 94 19 151/97 115 94 % -- -- -- -- -- -- -- --    04/29/25 0539 -- -- -- -- -- -- -- -- -- -- -- -- -- No Pain    04/29/25 0430 -- 92 22 153/77 107 95 % -- 28 2 L/min Nasal cannula -- Sitting -- --    04/29/25 0400 -- 96 24 148/78 106 95 % -- 28 2 L/min Nasal cannula -- Sitting -- --    04/29/25 0330 -- 92 23 158/80 112 95 % -- 28 2 L/min Nasal cannula -- Sitting -- --    04/29/25 0315 -- 95 22 144/76 104 96 % -- 28 2 L/min Nasal cannula -- Sitting -- --    04/29/25 0300 -- 89 23 145/75 103 98 % -- -- -- BiPAP -- Sitting -- --    04/29/25 0245 -- 90 22 151/71 102 98 % -- -- -- BiPAP -- Sitting -- --    04/29/25 0230 -- 92 24 151/71 105 99 % -- -- -- BiPAP -- Sitting -- --    04/29/25 0215 -- 93 24 162/74 109 98 % -- -- -- BiPAP -- Sitting -- --    04/29/25 0207 -- -- -- -- -- 95 % 40 -- -- BiPAP Face mask -- -- --    04/29/25 0200 -- 100 26 176/80 121 98 % -- -- -- BiPAP -- Sitting -- --    04/29/25 0145 -- 106 32 182/89 129 95 % -- 28 2 L/min Nasal cannula -- Sitting -- --    04/29/25 0135 -- -- -- -- -- -- -- -- -- -- -- -- 15 --     04/29/25 0133 -- -- -- -- -- -- -- -- -- Nasal cannula -- -- -- --    04/29/25 0130 -- 109 28 187/93 129 93 % -- 28 2 L/min Nasal cannula -- Sitting -- --    04/29/25 0106 98.5 °F (36.9 °C) 113 25 254/111 -- 81 % -- -- -- None (Room air) -- -- -- No Pain              Pertinent Labs/Diagnostic Test Results:   Radiology:  CT chest wo contrast   Final Interpretation by Mario Jimenez MD (04/29 0736)      Small bilateral pleural effusions, right greater than left and bilateral multi lobar patchy consolidation and groundglass opacities may represent pulmonary vascular congestion. Correlate with clinical findings to exclude superimposed pneumonia.      New borderline enlarged mediastinal lymph node likely reactive. This could be followed up with chest CT in 3 months if clinically warranted.      Additional chronic findings and negatives as above.      The study was marked in EPIC for immediate notification.               Workstation performed: [id number]         XR chest 1 view portable   Final Interpretation by Khris Farias DO (04/29 0232)      Enlarged cardiac silhouette with prominence of the pulmonary vascular and interstitial markings and patchy opacities as well as suspected right-sided pleural effusion, as described. Findings taken together suspicious for a component of fluid    overload/CHF. Superimposed infection considered in the appropriate clinical setting. Correlation with the patient's symptoms and laboratory values recommended.      Other findings as above.         Workstation performed: [id number]           Cardiology:  ECG 12 lead   Final Result by Sebastian Dial DO (04/29 0849)   Normal sinus rhythm   Right bundle branch block   Abnormal ECG   When compared with ECG of 29-Apr-2025 04:00, (unconfirmed)   T wave inversion less evident in Anterior leads   Confirmed by Sebastian Dial ([id number]) on 4/29/2025 8:49:03 AM      Echo complete   Final Result by Sebastian Dial DO  (04/29 0840)        Left Ventricle: Left ventricular cavity size is normal. Wall thickness    is mildly increased. The left ventricular ejection fraction is 40% by    visual estimation. Systolic function is mildly reduced. Diastolic function    is mildly abnormal, consistent with grade I (abnormal) relaxation.     The following segments are akinetic: basal inferoseptal, mid    inferoseptal and apical septal.     The following segments are hypokinetic: basal anteroseptal, basal    inferior, basal inferolateral, basal anterolateral, mid anteroseptal, mid    inferior, mid inferolateral, mid anterolateral, apical inferior and apical    lateral.     All other segments are normal.     Left Atrium: The atrium is mildly dilated.     Mitral Valve: There is mild regurgitation.     Tricuspid Valve: There is mild regurgitation. The estimated right    ventricular systolic pressure is 36.00 mmHg.     Pericardium: There is a small pericardial effusion. There is no    echocardiographic evidence of tamponade.         ECG 12 lead   Final Result by Sebastian Dial DO (04/29 0849)   Normal sinus rhythm   Right bundle branch block   Abnormal ECG   When compared with ECG of 29-Apr-2025 01:07, (unconfirmed)   No significant change was found   Confirmed by Sebastian Dial ([id number]) on 4/29/2025 8:49:10 AM      ECG 12 lead   Final Result by Sebastian Dial DO (04/29 0851)   Sinus tachycardia   Left axis deviation   Right bundle branch block   Abnormal ECG   When compared with ECG of 16-Mar-2025 13:27,   Premature atrial complexes are no longer Present   Confirmed by Sebastian Dial ([id number]) on 4/29/2025 8:51:19 AM           Results from last 7 days   Lab Units 04/29/25  1009 04/29/25  0523 04/29/25  0121   WBC Thousand/uL 13.25* 15.64* 14.62*   HEMOGLOBIN g/dL 9.1* 9.3* 9.2*   HEMATOCRIT % 29.1* 29.2* 30.1*   PLATELETS Thousands/uL 294 309 318   TOTAL NEUT ABS Thousands/µL  --   --  12.35*        Results from last 7 days   Lab Units  04/29/25  0523 04/29/25  0317 04/29/25  0121   SODIUM mmol/L 143  --  142   POTASSIUM mmol/L 4.6  --  4.6   CHLORIDE mmol/L 115*  --  113*   CO2 mmol/L 21  --  22   ANION GAP mmol/L 7  --  7   BUN mg/dL 40*  --  38*   CREATININE mg/dL 0.96  --  0.96   EGFR ml/min/1.73sq m 57  --  57   CALCIUM mg/dL 8.6  --  8.9   MAGNESIUM mg/dL  --   --  2.2   PHOSPHORUS mg/dL  --  4.2*  --      Results from last 7 days   Lab Units 04/29/25  0523 04/29/25  0121   AST U/L 22 22   ALT U/L 10 10   ALK PHOS U/L 66 68   TOTAL PROTEIN g/dL 6.4 6.2*   ALBUMIN g/dL 3.6 3.6   TOTAL BILIRUBIN mg/dL 0.48 0.55        Results from last 7 days   Lab Units 04/29/25  0523 04/29/25  0121   GLUCOSE RANDOM mg/dL 177* 159*        Results from last 7 days   Lab Units 04/29/25  0523 04/29/25  0121   PH MARYLIN  7.371 7.340   PCO2 MARYLIN mm Hg 33.2* 37.6*   PO2 MARY[place] mm Hg 108.5* 47.3*   HCO3 M[place] mmol/L 18.8* 19.8*   BASE EXC M[place] mmol/L -5.7 -5.4   O2 CONTENT M[place] ml/dL 14.1 12.2   O2 HGB, VENOUS % 96.0* 79.0        Results from last 7 days   Lab Units 04/29/25  0944 04/29/25  0738 04/29/25  0523 04/29/25  0317 04/29/25  0121   HS TNI 0HR ng/L  --   --  1,391*  --  467*   HS TNI 2HR ng/L  --  1,504*  --  1,029*  --    HSTNI D2 ng/L  --  113*  --  562*  --    HS TNI 4HR ng/L 1,431*  --  1,418*  --   --    HSTNI D4 ng/L 40*  --  27*  --   --         Results from last 7 days   Lab Units 04/29/25  1607 04/29/25  1009 04/29/25  0758 04/29/25  0121   PROTIME seconds  --  25.7* 25.2* 29.4*   INR   --  2.33* 2.28* 2.78*   PTT seconds 130* 31  --  30     Results from last 7 days   Lab Units 04/29/25  0121   TSH 3RD GENERATON uIU/mL 2.993     Results from last 7 days   Lab Units 04/29/25  0523 04/29/25  0121   PROCALCITONIN ng/ml 3.35* 0.28*     Results from last 7 days   Lab Units 04/29/25  0121   LACTIC ACID mmol/L 1.9        Results from last 7 days   Lab Units 04/29/25  0121   BNP pg/mL 636*     Results from last 7 days   Lab Units 04/29/25  0523   FERRITIN ng/mL 1,433*    IRON SATURATION % 7*   IRON ug/dL 18*   TIBC ug/dL 270.2     Results from last 7 days   Lab Units 04/29/25  0758 04/29/25  0523   TRANSFERRIN mg/dL 204 193*        Results from last 7 days   Lab Units 04/29/25  0121   LIPASE u/L 14        Results from last 7 days   Lab Units 04/29/25  0924   CLARITY UA  Clear   COLOR UA  Yellow   SPEC GRAV UA  1.025   PH UA  6.0   GLUCOSE UA mg/dl Negative   KETONES UA mg/dl Negative   BLOOD UA  Moderate*   PROTEIN UA mg/dl 100 (2+)*   NITRITE UA  Negative   BILIRUBIN UA  Negative   UROBILINOGEN UA E.U./dl 0.2   LEUKOCYTES UA  Negative   WBC UA /hpf 0-1   RBC UA /hpf 4-10*   BACTERIA UA /hpf Occasional   EPITHELIAL CELLS WET PREP /hpf Occasional     Results from last 7 days   Lab Units 04/29/25  0924   STREP PNEUMONIAE ANTIGEN, URINE  Negative   LEGIONELLA URINARY ANTIGEN  Negative        Results from last 7 days   Lab Units 04/29/25  0128 04/29/25  0121   BLOOD CULTURE  Received in Microbiology Lab. Culture in Progress. Received in Microbiology Lab. Culture in Progress.        Past Medical History:   Diagnosis Date    Asthma     Coronary artery disease     Diabetes mellitus ([facility])     DVT (deep venous thrombosis) ([facility])     GERD (gastroesophageal reflux disease)     Heart failure ([facility])     High blood cholesterol     High blood pressure     History of transfusion     Hypertension      Present on Admission:   Hypertensive urgency   Elevated troponin   Acute heart failure with reduced ejection fraction (HFrEF, <= 40%) ([facility])   LYDIA (obstructive sleep apnea)   Anemia   Moderate persistent asthma with acute exacerbation   Sepsis ([facility])   Acute respiratory failure with hypoxia ([facility])      Admitting Diagnosis: SOB (shortness of breath) [R06.02]  Elevated troponin [R79.89]  Acute respiratory failure with hypoxia ([facility]) [J96.01]  Pneumonia of both lungs due to infectious organism, unspecified part of lung [J18.9]  Asthma with acute exacerbation, unspecified asthma severity, unspecified whether  persistent [J45.901]  Acute on chronic congestive heart failure, unspecified heart failure type (HCC) [I50.9]  Age/Sex: 79 y.o. female    Network Utilization Review Department  ATTENTION: Please call with any questions or concerns to [phone] and carefully listen to the prompts so that you are directed to the right person. All voicemails are confidential.   For Discharge needs, contact Care Management DC Support Team at [phone] opt. [phone]  Send all requests for admission clinical reviews, approved or denied determinations and any other requests to dedicated fax number below belonging to the campus where the patient is receiving treatment. List of dedicated fax numbers for the Facilities:  FACILITY NAME UR FAX NUMBER   ADMISSION DENIALS (Administrative/Medical Necessity) [phone]   DISCHARGE SUPPORT TEAM (NETWORK) [phone]   PARENT CHILD HEALTH (Maternity/NICU/Pediatrics) [phone]   [facility] [phone]   [facility] [phone]   Critical access hospital [phone]   [facility] [phone]   [facility] [phone]   [facility] [phone]   [facility] [phone]   [facility] [phone]   [facility] [phone]   [facility] [phone]   [facility] [phone]   [facility] [phone]

## 2025-05-04 LAB
BACTERIA BLD CULT: NORMAL
BACTERIA BLD CULT: NORMAL

## 2025-05-07 ENCOUNTER — TELEPHONE (OUTPATIENT)
Age: 80
End: 2025-05-07

## 2025-05-07 NOTE — TELEPHONE ENCOUNTER
Bernabe, patients spouse, called to cancel PFT testing appointment for 5/8 due to patient being hospitalized. I warm transferred him to central scheduling to cancel test. No further questions at this time. Thank you.

## 2025-05-29 PROBLEM — A41.9 SEPSIS (HCC): Status: RESOLVED | Noted: 2025-04-29 | Resolved: 2025-05-29

## 2025-06-14 ENCOUNTER — APPOINTMENT (OUTPATIENT)
Dept: RADIOLOGY | Facility: CLINIC | Age: 80
End: 2025-06-14
Attending: PHYSICIAN ASSISTANT
Payer: COMMERCIAL

## 2025-06-14 ENCOUNTER — OFFICE VISIT (OUTPATIENT)
Dept: URGENT CARE | Facility: CLINIC | Age: 80
End: 2025-06-14
Payer: COMMERCIAL

## 2025-06-14 VITALS
WEIGHT: 202 LBS | DIASTOLIC BLOOD PRESSURE: 84 MMHG | RESPIRATION RATE: 20 BRPM | TEMPERATURE: 97 F | HEART RATE: 82 BPM | SYSTOLIC BLOOD PRESSURE: 218 MMHG | OXYGEN SATURATION: 94 % | HEIGHT: 64 IN | BODY MASS INDEX: 34.49 KG/M2

## 2025-06-14 DIAGNOSIS — S20.221A CONTUSION OF RIGHT SIDE OF BACK, INITIAL ENCOUNTER: ICD-10-CM

## 2025-06-14 DIAGNOSIS — S20.211A RIB CONTUSION, RIGHT, INITIAL ENCOUNTER: ICD-10-CM

## 2025-06-14 DIAGNOSIS — I16.0 HYPERTENSIVE URGENCY: Primary | ICD-10-CM

## 2025-06-14 PROCEDURE — 71101 X-RAY EXAM UNILAT RIBS/CHEST: CPT

## 2025-06-14 PROCEDURE — 99214 OFFICE O/P EST MOD 30 MIN: CPT | Performed by: PHYSICIAN ASSISTANT

## 2025-06-14 PROCEDURE — S9083 URGENT CARE CENTER GLOBAL: HCPCS | Performed by: PHYSICIAN ASSISTANT

## 2025-06-14 RX ORDER — GABAPENTIN 600 MG/1
600 TABLET ORAL 3 TIMES DAILY
COMMUNITY

## 2025-06-14 RX ORDER — SPIRONOLACTONE 25 MG/1
25 TABLET ORAL DAILY
COMMUNITY
Start: 2025-05-28 | End: 2025-06-27

## 2025-06-14 RX ORDER — PANTOPRAZOLE SODIUM 40 MG/1
40 TABLET, DELAYED RELEASE ORAL
COMMUNITY
Start: 2025-06-11

## 2025-06-14 RX ORDER — WARFARIN SODIUM 5 MG/1
5 TABLET ORAL
COMMUNITY

## 2025-06-14 RX ORDER — FUROSEMIDE 40 MG/1
40 TABLET ORAL 2 TIMES DAILY
COMMUNITY

## 2025-06-14 RX ORDER — ZINC GLUCONATE 50 MG
50 TABLET ORAL DAILY
COMMUNITY

## 2025-06-14 NOTE — PROGRESS NOTES
[facility]        NAME: Mary Carmen Woo is a 79 y.o. female  : 1945    MRN: [id number]  DATE: 2025  TIME: 2:00 PM    Assessment and Plan   Hypertensive urgency [I16.0]  1. Hypertensive urgency        2. Rib contusion, right, initial encounter  XR ribs right w pa chest min 3 views      3. Contusion of right side of back, initial encounter          Discussed concern for blood pressure with patient.  Patient notes she did not take her blood pressure medications today.  Also states that headaches are not abnormal for her when she falls asleep with the TV on which she did last night.  Based on blood pressure readings x 2, patient needs to go to the ER for hypertensive urgency.  Discussed risk and benefits at length with patient.  States she will go home and take her blood pressure medication and recheck her blood pressure in an hour.  Patient very concerned about financial stress to go back to the ER.     Patient Instructions   Tylenol for back/rib pain.  Ice.  Take blood pressure medication.    Follow up with PCP in 3-5 days.  Proceed to  ER if symptoms worsen.    If tests have been performed at [facility], our office will contact you with results if changes need to be made to the care plan discussed with you at the visit.  You can review your full results on [facility].    Chief Complaint     Chief Complaint   Patient presents with    Fall     Fell against door jam and hit right side back 4 days ago. Did not fall all the way to the floor.          History of Present Illness       Patient is a 79-year-old female with significant past medical history of hypertension, diabetes, asthma, and CAD on warfarin presents the office planing of right-sided back pain for 4 days.  States she lost her balance and struck her back on the door.  Did not fall the way down.  Pain is rated 6 out of 10 with associated ecchymosis.  Denies any changes in bowel or bladder.  She has been using topical Voltaren  "with little relief.    Blood pressure to be notably elevated on during rooming process.  States she has not taken any of her blood pressure medications today.  Reporting 7-10 headache.  States this is not abnormal for her after sleeping with the TV on which she did last night.  Dates Tylenol does not have her headache.  Used Fioricet in the past with good relief.        Review of Systems   Review of Systems   Constitutional:  Negative for fever.   Gastrointestinal:  Negative for abdominal pain, constipation and diarrhea.   Genitourinary:  Negative for dysuria, frequency and urgency.   Musculoskeletal:  Positive for back pain.   Neurological:  Negative for numbness.         Current Medications     Current Medications[1]    Current Allergies     Allergies as of 06/14/2025 - Reviewed 06/14/2025   Allergen Reaction Noted    Codeine GI Intolerance and Other (See Comments) 01/22/2013    Sulfasalazine Other (See Comments) 04/16/2025    Amlodipine Rash and Swelling 10/01/2024    Sulfa antibiotics Other (See Comments) and Rash 01/22/2013            The following portions of the patient's history were reviewed and updated as appropriate: allergies, current medications, past family history, past medical history, past social history, past surgical history and problem list.     Past Medical History[2]    Past Surgical History[3]    Family History[4]      Medications have been verified.        Objective   BP (!) 218/84   Pulse 82   Temp (!) 97 °F (36.1 °C)   Resp 20   Ht 5' 4\" (1.626 m)   Wt 91.6 kg (202 lb)   SpO2 94% Comment: ambulation  BMI 34.67 kg/m²   No LMP recorded. Patient is postmenopausal.       Physical Exam     Physical Exam  Vitals and nursing note reviewed.   Constitutional:       Appearance: She is well-developed.   HENT:      Head: Normocephalic and atraumatic.      Right Ear: External ear normal.      Left Ear: External ear normal.      Nose: Nose normal.     Eyes:      General: Lids are normal.      " Conjunctiva/sclera: Conjunctivae normal.     Pulmonary:      Effort: Tachypnea (on ambulation) present.      Breath sounds: Examination of the right-lower field reveals decreased breath sounds and rales. Examination of the left-lower field reveals decreased breath sounds and rales. Decreased breath sounds and rales present. No wheezing or rhonchi.     Musculoskeletal:      Thoracic back: Signs of trauma (Ecchymosis) and bony tenderness present.      Lumbar back: No tenderness or bony tenderness (No midline tenderness). Normal range of motion.        Back:      Skin:     General: Skin is warm and dry.      Capillary Refill: Capillary refill takes less than 2 seconds.      Comments: 2+ lower extremity pitting edema     Neurological:      Mental Status: She is alert.           Right rib and chest x-ray: Cardiomegaly. No rib fracture.  No acute findings.                 [1]   Current Outpatient Medications:     atorvastatin (LIPITOR) 40 mg tablet, Take 40 mg by mouth daily at bedtime, Disp: , Rfl:     butalbital-acetaminophen-caffeine (FIORICET,ESGIC) -40 mg per tablet, , Disp: , Rfl:     Calcium Carbonate-Vitamin D 600-10 MG-MCG TABS, Take 1 tablet by mouth daily, Disp: , Rfl:     carvedilol (COREG) 12.5 mg tablet, Take 1 tablet (12.5 mg total) by mouth 2 (two) times a day with meals, Disp: 60 tablet, Rfl: 0    fluticasone (FLONASE) 50 mcg/act nasal spray, , Disp: , Rfl:     Fluticasone-Salmeterol (Advair) 250-50 mcg/dose inhaler, , Disp: , Rfl:     folic acid (FOLVITE) 1 mg tablet, Take 1 mg by mouth in the morning., Disp: , Rfl:     furosemide (LASIX) 40 mg tablet, Take 40 mg by mouth 2 (two) times a day, Disp: , Rfl:     gabapentin (NEURONTIN) 600 MG tablet, Take 600 mg by mouth 3 (three) times a day, Disp: , Rfl:     montelukast (SINGULAIR) 10 mg tablet, Take 10 mg by mouth, Disp: , Rfl:     Multiple Vitamins-Minerals (PreserVision AREDS 2) CAPS, Take by mouth, Disp: , Rfl:     pantoprazole (PROTONIX) 40 mg  tablet, Take 40 mg by mouth, Disp: , Rfl:     spironolactone (ALDACTONE) 25 mg tablet, Take 25 mg by mouth daily, Disp: , Rfl:     thiamine 250 MG tablet, Take 250 mg by mouth daily, Disp: , Rfl:     warfarin (COUMADIN) 5 mg tablet, Take 5 mg by mouth daily, Disp: , Rfl:     zinc gluconate 50 mg tablet, Take 50 mg by mouth daily, Disp: , Rfl:     aspirin 81 mg chewable tablet, Chew 81 mg daily (Patient not taking: Reported on 6/14/2025), Disp: , Rfl:     Cholecalciferol 25 MCG (1000 UT) capsule, Take 1,000 Units by mouth in the morning. (Patient not taking: Reported on 6/14/2025), Disp: , Rfl:     fluticasone-umeclidinium-vilanterol (Trelegy Ellipta) 100-62.5-25 mcg/actuation inhaler, Inhale 1 puff daily Rinse mouth after use. (Patient not taking: Reported on 6/14/2025), Disp: 60 blister, Rfl: 0    furosemide (LASIX) 10 mg/mL, Inject 5 mL (50 mg total) into a catheter in a vein 2 (two) times a day (Patient not taking: Reported on 6/14/2025), Disp: , Rfl:     gabapentin (NEURONTIN) 800 mg tablet, Take 800 mg by mouth 3 (three) times a day (Patient not taking: Reported on 6/14/2025), Disp: , Rfl:     heparin, porcine, 54890-4.45 UT/250ML-%, Inject 270-1,800 Units/hr into a catheter in a vein at 2.7-18 mL/hr titrated (Patient not taking: Reported on 6/14/2025), Disp: , Rfl:     lisinopril (ZESTRIL) 40 mg tablet, Take 40 mg by mouth daily (Patient not taking: Reported on 6/14/2025), Disp: , Rfl:     Magnesium 400 MG TABS, , Disp: , Rfl:     omeprazole (PriLOSEC) 40 MG capsule, Take 1 capsule by mouth in the morning (Patient not taking: Reported on 6/14/2025), Disp: , Rfl:   [2]   Past Medical History:  Diagnosis Date    Asthma     Coronary artery disease     Diabetes mellitus (HCC)     DVT (deep venous thrombosis) (HCC)     GERD (gastroesophageal reflux disease)     Heart failure (HCC)     High blood cholesterol     High blood pressure     History of transfusion     Hypertension    [3]   Past Surgical  History:  Procedure Laterality Date    APPENDECTOMY      BACK SURGERY      CATARACT EXTRACTION Bilateral     CHOLECYSTECTOMY      REPLACEMENT TOTAL KNEE Bilateral     TUBAL LIGATION     [4]   Family History  Problem Relation Name Age of Onset    Diabetes Mother      Heart disease Father      Heart disease Brother      Diabetes Son      Hodgkin's lymphoma Daughter      No Known Problems Maternal Grandmother      No Known Problems Maternal Grandfather      No Known Problems Paternal Grandmother      No Known Problems Paternal Grandfather

## 2025-06-14 NOTE — LETTER
June 14, 2025     Patient: Mary Carmen Woo   YOB: 1945   Date of Visit: 6/14/2025       To Whom It May Concern:    It is my medical opinion that Mary Carmen Woo may return to work on 6/16/2025.             Sincerely,        Latanya Lima PA-C

## 2025-06-15 ENCOUNTER — RESULTS FOLLOW-UP (OUTPATIENT)
Dept: URGENT CARE | Facility: CLINIC | Age: 80
End: 2025-06-15

## 2025-08-24 PROBLEM — I50.43 ACUTE ON CHRONIC COMBINED SYSTOLIC AND DIASTOLIC CHF (CONGESTIVE HEART FAILURE) (HCC): Status: ACTIVE | Noted: 2025-08-24

## 2025-08-24 PROBLEM — A41.9 SEPSIS (HCC): Status: ACTIVE | Noted: 2025-08-24

## 2025-08-24 PROBLEM — E87.8 ELECTROLYTE ABNORMALITY: Status: ACTIVE | Noted: 2025-08-24

## 2025-08-24 PROBLEM — I25.10 CAD (CORONARY ARTERY DISEASE): Status: ACTIVE | Noted: 2025-08-24

## 2025-08-24 PROBLEM — I16.1 HYPERTENSIVE EMERGENCY: Status: ACTIVE | Noted: 2025-03-16

## 2025-08-24 PROBLEM — N39.0 UTI (URINARY TRACT INFECTION): Status: ACTIVE | Noted: 2025-08-24

## 2025-08-25 PROBLEM — I67.83 PRES (POSTERIOR REVERSIBLE ENCEPHALOPATHY SYNDROME): Status: ACTIVE | Noted: 2025-08-25

## 2025-08-25 PROBLEM — I67.4 HYPERTENSIVE ENCEPHALOPATHY: Status: ACTIVE | Noted: 2025-08-25

## 2025-08-25 PROBLEM — R79.1 ELEVATED INR: Status: ACTIVE | Noted: 2025-08-25

## 2025-08-27 PROBLEM — E87.8 ELECTROLYTE ABNORMALITY: Status: RESOLVED | Noted: 2025-08-24 | Resolved: 2025-08-27
